# Patient Record
Sex: FEMALE | Race: WHITE | Employment: UNEMPLOYED | URBAN - METROPOLITAN AREA
[De-identification: names, ages, dates, MRNs, and addresses within clinical notes are randomized per-mention and may not be internally consistent; named-entity substitution may affect disease eponyms.]

---

## 2017-01-10 ENCOUNTER — PATIENT MESSAGE (OUTPATIENT)
Dept: FAMILY MEDICINE CLINIC | Age: 37
End: 2017-01-10

## 2017-01-10 RX ORDER — METHYLPHENIDATE HYDROCHLORIDE 10 MG/1
10 TABLET ORAL 2 TIMES DAILY
Qty: 60 TABLET | Refills: 0 | Status: SHIPPED | OUTPATIENT
Start: 2017-01-10 | End: 2017-03-09 | Stop reason: SDUPTHER

## 2017-01-16 RX ORDER — QUETIAPINE FUMARATE 200 MG/1
TABLET, FILM COATED ORAL
Qty: 30 TABLET | Refills: 1 | Status: SHIPPED | OUTPATIENT
Start: 2017-01-16 | End: 2017-03-20 | Stop reason: SDUPTHER

## 2017-01-31 RX ORDER — ALPRAZOLAM 2 MG/1
TABLET ORAL
Qty: 70 TABLET | Refills: 2 | Status: SHIPPED | OUTPATIENT
Start: 2017-01-31 | End: 2017-05-12 | Stop reason: SDUPTHER

## 2017-02-07 ENCOUNTER — PATIENT MESSAGE (OUTPATIENT)
Dept: FAMILY MEDICINE CLINIC | Age: 37
End: 2017-02-07

## 2017-02-15 RX ORDER — BUSPIRONE HYDROCHLORIDE 30 MG/1
45 TABLET ORAL 2 TIMES DAILY
Qty: 90 TABLET | Refills: 5 | Status: SHIPPED | OUTPATIENT
Start: 2017-02-15 | End: 2017-08-18 | Stop reason: SDUPTHER

## 2017-02-22 ENCOUNTER — HOSPITAL ENCOUNTER (OUTPATIENT)
Dept: NUCLEAR MEDICINE | Age: 37
Discharge: HOME | End: 2017-02-22
Admitting: INTERNAL MEDICINE

## 2017-02-22 DIAGNOSIS — K30 FUNCTIONAL DYSPEPSIA: ICD-10-CM

## 2017-02-22 RX ADMIN — Medication 2 MILLICURIE: at 09:03

## 2017-03-07 ENCOUNTER — PATIENT MESSAGE (OUTPATIENT)
Dept: FAMILY MEDICINE CLINIC | Age: 37
End: 2017-03-07

## 2017-03-09 ENCOUNTER — PATIENT MESSAGE (OUTPATIENT)
Dept: FAMILY MEDICINE CLINIC | Age: 37
End: 2017-03-09

## 2017-03-09 RX ORDER — SUMATRIPTAN 50 MG/1
50 TABLET, FILM COATED ORAL DAILY
Qty: 30 TABLET | Refills: 1 | Status: SHIPPED | OUTPATIENT
Start: 2017-03-09 | End: 2017-07-27 | Stop reason: SDUPTHER

## 2017-03-09 RX ORDER — METHYLPHENIDATE HYDROCHLORIDE 10 MG/1
10 TABLET ORAL 2 TIMES DAILY
Qty: 60 TABLET | Refills: 0 | Status: SHIPPED | OUTPATIENT
Start: 2017-03-09 | End: 2017-05-24 | Stop reason: SDUPTHER

## 2017-03-13 RX ORDER — LEVOTHYROXINE SODIUM 0.05 MG/1
100 TABLET ORAL DAILY
Qty: 60 TABLET | Refills: 0 | Status: SHIPPED | OUTPATIENT
Start: 2017-03-13 | End: 2017-04-23 | Stop reason: SDUPTHER

## 2017-03-25 ENCOUNTER — PATIENT MESSAGE (OUTPATIENT)
Dept: FAMILY MEDICINE CLINIC | Age: 37
End: 2017-03-25

## 2017-03-25 DIAGNOSIS — I83.90 VARICOSE VEIN OF LEG: Primary | ICD-10-CM

## 2017-04-20 RX ORDER — QUETIAPINE FUMARATE 200 MG/1
TABLET, FILM COATED ORAL
Qty: 30 TABLET | Refills: 3 | Status: SHIPPED | OUTPATIENT
Start: 2017-04-20 | End: 2017-09-18 | Stop reason: SDUPTHER

## 2017-04-20 RX ORDER — CLOMIPRAMINE HYDROCHLORIDE 50 MG/1
CAPSULE ORAL
Qty: 60 CAPSULE | Refills: 3 | Status: SHIPPED | OUTPATIENT
Start: 2017-04-20 | End: 2017-09-25 | Stop reason: SDUPTHER

## 2017-04-20 RX ORDER — CYCLOBENZAPRINE HCL 10 MG
TABLET ORAL
Qty: 30 TABLET | Refills: 3 | Status: SHIPPED | OUTPATIENT
Start: 2017-04-20 | End: 2017-08-21 | Stop reason: SDUPTHER

## 2017-04-24 RX ORDER — LEVOTHYROXINE SODIUM 0.05 MG/1
TABLET ORAL
Qty: 60 TABLET | Refills: 0 | Status: SHIPPED | OUTPATIENT
Start: 2017-04-24 | End: 2017-06-01 | Stop reason: SDUPTHER

## 2017-05-11 ENCOUNTER — TELEPHONE (OUTPATIENT)
Dept: FAMILY MEDICINE CLINIC | Age: 37
End: 2017-05-11

## 2017-05-12 ENCOUNTER — PATIENT MESSAGE (OUTPATIENT)
Dept: FAMILY MEDICINE CLINIC | Age: 37
End: 2017-05-12

## 2017-05-12 RX ORDER — ALPRAZOLAM 2 MG/1
2 TABLET ORAL 3 TIMES DAILY PRN
Qty: 90 TABLET | Refills: 2 | Status: SHIPPED | OUTPATIENT
Start: 2017-05-12 | End: 2017-08-18 | Stop reason: SDUPTHER

## 2017-05-24 ENCOUNTER — PATIENT MESSAGE (OUTPATIENT)
Dept: FAMILY MEDICINE CLINIC | Age: 37
End: 2017-05-24

## 2017-05-24 RX ORDER — METHYLPHENIDATE HYDROCHLORIDE 10 MG/1
10 TABLET ORAL 2 TIMES DAILY
Qty: 60 TABLET | Refills: 0 | Status: SHIPPED | OUTPATIENT
Start: 2017-05-24 | End: 2017-07-14 | Stop reason: SDUPTHER

## 2017-05-26 ENCOUNTER — OFFICE VISIT (OUTPATIENT)
Dept: FAMILY MEDICINE CLINIC | Age: 37
End: 2017-05-26

## 2017-05-26 VITALS
BODY MASS INDEX: 45.99 KG/M2 | SYSTOLIC BLOOD PRESSURE: 124 MMHG | DIASTOLIC BLOOD PRESSURE: 80 MMHG | TEMPERATURE: 97.7 F | WEIGHT: 293 LBS | HEART RATE: 114 BPM | HEIGHT: 67 IN | RESPIRATION RATE: 20 BRPM

## 2017-05-26 DIAGNOSIS — E78.5 HYPERLIPIDEMIA, UNSPECIFIED: ICD-10-CM

## 2017-05-26 DIAGNOSIS — F98.8 ADD (ATTENTION DEFICIT DISORDER): ICD-10-CM

## 2017-05-26 DIAGNOSIS — K76.0 NAFLD (NONALCOHOLIC FATTY LIVER DISEASE): ICD-10-CM

## 2017-05-26 DIAGNOSIS — F31.81 BIPOLAR 2 DISORDER (HCC): ICD-10-CM

## 2017-05-26 DIAGNOSIS — K31.84 GASTROPARESIS: ICD-10-CM

## 2017-05-26 DIAGNOSIS — F41.9 ANXIETY: Primary | ICD-10-CM

## 2017-05-26 DIAGNOSIS — Z13.1 SCREENING FOR DIABETES MELLITUS: ICD-10-CM

## 2017-05-26 DIAGNOSIS — F51.04 PSYCHOPHYSIOLOGICAL INSOMNIA: ICD-10-CM

## 2017-05-26 DIAGNOSIS — E03.9 ACQUIRED HYPOTHYROIDISM: ICD-10-CM

## 2017-05-26 PROCEDURE — 99214 OFFICE O/P EST MOD 30 MIN: CPT | Performed by: FAMILY MEDICINE

## 2017-05-26 PROCEDURE — G0444 DEPRESSION SCREEN ANNUAL: HCPCS | Performed by: FAMILY MEDICINE

## 2017-05-26 RX ORDER — TOPIRAMATE 100 MG/1
100 TABLET, FILM COATED ORAL 2 TIMES DAILY
Qty: 60 TABLET | Refills: 3 | Status: SHIPPED | OUTPATIENT
Start: 2017-05-26 | End: 2017-09-18 | Stop reason: SDUPTHER

## 2017-05-26 ASSESSMENT — PATIENT HEALTH QUESTIONNAIRE - PHQ9
SUM OF ALL RESPONSES TO PHQ9 QUESTIONS 1 & 2: 6
1. LITTLE INTEREST OR PLEASURE IN DOING THINGS: 3
10. IF YOU CHECKED OFF ANY PROBLEMS, HOW DIFFICULT HAVE THESE PROBLEMS MADE IT FOR YOU TO DO YOUR WORK, TAKE CARE OF THINGS AT HOME, OR GET ALONG WITH OTHER PEOPLE: 0
7. TROUBLE CONCENTRATING ON THINGS, SUCH AS READING THE NEWSPAPER OR WATCHING TELEVISION: 3
3. TROUBLE FALLING OR STAYING ASLEEP: 3
4. FEELING TIRED OR HAVING LITTLE ENERGY: 3
5. POOR APPETITE OR OVEREATING: 0
6. FEELING BAD ABOUT YOURSELF - OR THAT YOU ARE A FAILURE OR HAVE LET YOURSELF OR YOUR FAMILY DOWN: 3
9. THOUGHTS THAT YOU WOULD BE BETTER OFF DEAD, OR OF HURTING YOURSELF: 0
2. FEELING DOWN, DEPRESSED OR HOPELESS: 3
SUM OF ALL RESPONSES TO PHQ QUESTIONS 1-9: 18
8. MOVING OR SPEAKING SO SLOWLY THAT OTHER PEOPLE COULD HAVE NOTICED. OR THE OPPOSITE, BEING SO FIGETY OR RESTLESS THAT YOU HAVE BEEN MOVING AROUND A LOT MORE THAN USUAL: 0

## 2017-06-02 RX ORDER — LEVOTHYROXINE SODIUM 0.05 MG/1
TABLET ORAL
Qty: 60 TABLET | Refills: 0 | Status: SHIPPED | OUTPATIENT
Start: 2017-06-02 | End: 2018-02-22 | Stop reason: SDUPTHER

## 2017-06-12 RX ORDER — OMEPRAZOLE 40 MG/1
CAPSULE, DELAYED RELEASE ORAL
Qty: 60 CAPSULE | Refills: 5 | Status: SHIPPED | OUTPATIENT
Start: 2017-06-12 | End: 2017-11-22 | Stop reason: SDUPTHER

## 2017-07-12 ENCOUNTER — PATIENT MESSAGE (OUTPATIENT)
Dept: FAMILY MEDICINE CLINIC | Age: 37
End: 2017-07-12

## 2017-07-14 RX ORDER — METHYLPHENIDATE HYDROCHLORIDE 10 MG/1
10 TABLET ORAL 2 TIMES DAILY
Qty: 60 TABLET | Refills: 0 | Status: SHIPPED | OUTPATIENT
Start: 2017-07-14 | End: 2017-09-15 | Stop reason: SDUPTHER

## 2017-07-21 RX ORDER — LAMOTRIGINE 200 MG/1
TABLET ORAL
Qty: 60 TABLET | Refills: 3 | Status: SHIPPED | OUTPATIENT
Start: 2017-07-21 | End: 2017-11-11 | Stop reason: SDUPTHER

## 2017-07-27 ENCOUNTER — TELEPHONE (OUTPATIENT)
Dept: FAMILY MEDICINE CLINIC | Age: 37
End: 2017-07-27

## 2017-07-27 RX ORDER — SUMATRIPTAN 50 MG/1
TABLET, FILM COATED ORAL
Qty: 9 TABLET | Refills: 3 | Status: SHIPPED | OUTPATIENT
Start: 2017-07-27 | End: 2017-12-12 | Stop reason: SDUPTHER

## 2017-07-31 ENCOUNTER — PATIENT MESSAGE (OUTPATIENT)
Dept: FAMILY MEDICINE CLINIC | Age: 37
End: 2017-07-31

## 2017-08-01 RX ORDER — PREDNISONE 10 MG/1
TABLET ORAL
Qty: 30 TABLET | Refills: 0 | Status: SHIPPED | OUTPATIENT
Start: 2017-08-01 | End: 2017-08-11

## 2017-08-01 RX ORDER — PREDNISONE 10 MG/1
30 TABLET ORAL DAILY
Qty: 21 TABLET | Refills: 0 | Status: SHIPPED | OUTPATIENT
Start: 2017-08-01 | End: 2017-08-08

## 2017-08-21 RX ORDER — BUSPIRONE HYDROCHLORIDE 30 MG/1
TABLET ORAL
Qty: 90 TABLET | Refills: 4 | Status: SHIPPED | OUTPATIENT
Start: 2017-08-21 | End: 2018-01-07 | Stop reason: SDUPTHER

## 2017-08-21 RX ORDER — CYCLOBENZAPRINE HCL 10 MG
TABLET ORAL
Qty: 30 TABLET | Refills: 3 | Status: SHIPPED | OUTPATIENT
Start: 2017-08-21 | End: 2017-12-22 | Stop reason: SDUPTHER

## 2017-08-21 RX ORDER — ALPRAZOLAM 2 MG/1
TABLET ORAL
Qty: 90 TABLET | Refills: 2 | Status: SHIPPED | OUTPATIENT
Start: 2017-08-21 | End: 2017-11-11 | Stop reason: SDUPTHER

## 2017-08-23 DIAGNOSIS — K76.0 NAFLD (NONALCOHOLIC FATTY LIVER DISEASE): ICD-10-CM

## 2017-08-23 DIAGNOSIS — Z13.1 SCREENING FOR DIABETES MELLITUS: ICD-10-CM

## 2017-08-23 DIAGNOSIS — E78.5 HYPERLIPIDEMIA, UNSPECIFIED: ICD-10-CM

## 2017-08-23 DIAGNOSIS — E03.9 ACQUIRED HYPOTHYROIDISM: ICD-10-CM

## 2017-08-23 LAB
A/G RATIO: 1.4 (ref 1.1–2.2)
ALBUMIN SERPL-MCNC: 4 G/DL (ref 3.4–5)
ALP BLD-CCNC: 119 U/L (ref 40–129)
ALT SERPL-CCNC: 71 U/L (ref 10–40)
ANION GAP SERPL CALCULATED.3IONS-SCNC: 18 MMOL/L (ref 3–16)
AST SERPL-CCNC: 63 U/L (ref 15–37)
BILIRUB SERPL-MCNC: 0.3 MG/DL (ref 0–1)
BUN BLDV-MCNC: 17 MG/DL (ref 7–20)
CALCIUM SERPL-MCNC: 9.3 MG/DL (ref 8.3–10.6)
CHLORIDE BLD-SCNC: 99 MMOL/L (ref 99–110)
CHOLESTEROL, TOTAL: 205 MG/DL (ref 0–199)
CO2: 21 MMOL/L (ref 21–32)
CREAT SERPL-MCNC: 0.6 MG/DL (ref 0.6–1.1)
GFR AFRICAN AMERICAN: >60
GFR NON-AFRICAN AMERICAN: >60
GLOBULIN: 2.8 G/DL
GLUCOSE BLD-MCNC: 205 MG/DL (ref 70–99)
HDLC SERPL-MCNC: 36 MG/DL (ref 40–60)
LDL CHOLESTEROL CALCULATED: 127 MG/DL
POTASSIUM SERPL-SCNC: 4.4 MMOL/L (ref 3.5–5.1)
SODIUM BLD-SCNC: 138 MMOL/L (ref 136–145)
TOTAL PROTEIN: 6.8 G/DL (ref 6.4–8.2)
TRIGL SERPL-MCNC: 210 MG/DL (ref 0–150)
TSH SERPL DL<=0.05 MIU/L-ACNC: 1.43 UIU/ML (ref 0.27–4.2)
VLDLC SERPL CALC-MCNC: 42 MG/DL

## 2017-08-24 LAB
ESTIMATED AVERAGE GLUCOSE: 260.4 MG/DL
HBA1C MFR BLD: 10.7 %

## 2017-08-25 DIAGNOSIS — E11.9 TYPE 2 DIABETES MELLITUS WITHOUT COMPLICATION, WITHOUT LONG-TERM CURRENT USE OF INSULIN (HCC): Primary | ICD-10-CM

## 2017-08-29 ENCOUNTER — OFFICE VISIT (OUTPATIENT)
Dept: FAMILY MEDICINE CLINIC | Age: 37
End: 2017-08-29

## 2017-08-29 VITALS
RESPIRATION RATE: 20 BRPM | SYSTOLIC BLOOD PRESSURE: 132 MMHG | HEIGHT: 67 IN | TEMPERATURE: 98 F | BODY MASS INDEX: 45.99 KG/M2 | WEIGHT: 293 LBS | HEART RATE: 98 BPM | DIASTOLIC BLOOD PRESSURE: 86 MMHG

## 2017-08-29 DIAGNOSIS — E11.9 TYPE 2 DIABETES MELLITUS WITHOUT COMPLICATION, WITHOUT LONG-TERM CURRENT USE OF INSULIN (HCC): Primary | ICD-10-CM

## 2017-08-29 DIAGNOSIS — E11.9 TYPE 2 DIABETES MELLITUS WITHOUT COMPLICATION, WITHOUT LONG-TERM CURRENT USE OF INSULIN (HCC): ICD-10-CM

## 2017-08-29 LAB
CREATININE URINE: 134.6 MG/DL (ref 28–259)
MICROALBUMIN UR-MCNC: 4.1 MG/DL
MICROALBUMIN/CREAT UR-RTO: 30.5 MG/G (ref 0–30)

## 2017-08-29 PROCEDURE — 99214 OFFICE O/P EST MOD 30 MIN: CPT | Performed by: FAMILY MEDICINE

## 2017-08-29 RX ORDER — LANCING DEVICE/LANCETS
KIT MISCELLANEOUS
Qty: 1 KIT | Refills: 0 | Status: SHIPPED | OUTPATIENT
Start: 2017-08-29 | End: 2018-02-22 | Stop reason: SDUPTHER

## 2017-08-29 RX ORDER — LANCETS
EACH MISCELLANEOUS
Qty: 100 EACH | Refills: 5 | Status: SHIPPED | OUTPATIENT
Start: 2017-08-29 | End: 2018-08-24 | Stop reason: SDUPTHER

## 2017-08-30 ENCOUNTER — TELEPHONE (OUTPATIENT)
Dept: FAMILY MEDICINE CLINIC | Age: 37
End: 2017-08-30

## 2017-08-31 ENCOUNTER — TELEPHONE (OUTPATIENT)
Dept: FAMILY MEDICINE CLINIC | Age: 37
End: 2017-08-31

## 2017-09-25 ENCOUNTER — OFFICE VISIT (OUTPATIENT)
Dept: SLEEP MEDICINE | Age: 37
End: 2017-09-25

## 2017-09-25 VITALS
TEMPERATURE: 97.8 F | HEART RATE: 109 BPM | RESPIRATION RATE: 16 BRPM | OXYGEN SATURATION: 95 % | DIASTOLIC BLOOD PRESSURE: 78 MMHG | BODY MASS INDEX: 45.99 KG/M2 | WEIGHT: 293 LBS | SYSTOLIC BLOOD PRESSURE: 118 MMHG | HEIGHT: 67 IN

## 2017-09-25 DIAGNOSIS — G47.33 OSA (OBSTRUCTIVE SLEEP APNEA): Primary | ICD-10-CM

## 2017-09-25 PROCEDURE — 99204 OFFICE O/P NEW MOD 45 MIN: CPT | Performed by: PSYCHIATRY & NEUROLOGY

## 2017-09-25 ASSESSMENT — ENCOUNTER SYMPTOMS
EYES NEGATIVE: 1
CHOKING: 1
ALLERGIC/IMMUNOLOGIC NEGATIVE: 1
GASTROINTESTINAL NEGATIVE: 1
BACK PAIN: 1

## 2017-09-25 ASSESSMENT — SLEEP AND FATIGUE QUESTIONNAIRES
HOW LIKELY ARE YOU TO NOD OFF OR FALL ASLEEP WHEN YOU ARE A PASSENGER IN A CAR FOR AN HOUR WITHOUT A BREAK: 3
HOW LIKELY ARE YOU TO NOD OFF OR FALL ASLEEP WHILE WATCHING TV: 3
HOW LIKELY ARE YOU TO NOD OFF OR FALL ASLEEP WHILE SITTING QUIETLY AFTER LUNCH WITHOUT ALCOHOL: 3
ESS TOTAL SCORE: 15
HOW LIKELY ARE YOU TO NOD OFF OR FALL ASLEEP WHILE LYING DOWN TO REST IN THE AFTERNOON WHEN CIRCUMSTANCES PERMIT: 3
HOW LIKELY ARE YOU TO NOD OFF OR FALL ASLEEP WHILE SITTING AND TALKING TO SOMEONE: 0
HOW LIKELY ARE YOU TO NOD OFF OR FALL ASLEEP WHILE SITTING AND READING: 3
HOW LIKELY ARE YOU TO NOD OFF OR FALL ASLEEP WHILE SITTING INACTIVE IN A PUBLIC PLACE: 0
HOW LIKELY ARE YOU TO NOD OFF OR FALL ASLEEP IN A CAR, WHILE STOPPED FOR A FEW MINUTES IN TRAFFIC: 0

## 2017-10-11 RX ORDER — TOPIRAMATE 50 MG/1
TABLET, FILM COATED ORAL
Qty: 60 TABLET | Refills: 4 | OUTPATIENT
Start: 2017-10-11

## 2017-10-24 DIAGNOSIS — E78.2 MIXED HYPERLIPIDEMIA: ICD-10-CM

## 2017-10-24 RX ORDER — ATORVASTATIN CALCIUM 40 MG/1
TABLET, FILM COATED ORAL
Qty: 30 TABLET | Refills: 5 | Status: SHIPPED | OUTPATIENT
Start: 2017-10-24 | End: 2018-04-24 | Stop reason: SDUPTHER

## 2017-10-24 RX ORDER — LEVOTHYROXINE SODIUM 0.05 MG/1
TABLET ORAL
Qty: 60 TABLET | Refills: 5 | Status: SHIPPED | OUTPATIENT
Start: 2017-10-24 | End: 2018-05-07 | Stop reason: SDUPTHER

## 2017-10-29 ENCOUNTER — HOSPITAL ENCOUNTER (OUTPATIENT)
Dept: OTHER | Age: 37
Discharge: HOME | End: 2017-10-30

## 2017-10-29 ENCOUNTER — HOSPITAL ENCOUNTER (OUTPATIENT)
Dept: OTHER | Age: 37
Discharge: HOME | End: 2017-10-29
Attending: PSYCHIATRY & NEUROLOGY | Admitting: PSYCHIATRY & NEUROLOGY

## 2017-10-29 DIAGNOSIS — G47.33 OSA (OBSTRUCTIVE SLEEP APNEA): ICD-10-CM

## 2017-10-30 PROCEDURE — 95810 POLYSOM 6/> YRS 4/> PARAM: CPT | Performed by: PSYCHIATRY & NEUROLOGY

## 2017-11-01 ENCOUNTER — TELEPHONE (OUTPATIENT)
Dept: PULMONOLOGY | Age: 37
End: 2017-11-01

## 2017-11-13 ENCOUNTER — PATIENT MESSAGE (OUTPATIENT)
Dept: FAMILY MEDICINE CLINIC | Age: 37
End: 2017-11-13

## 2017-11-13 RX ORDER — TOPIRAMATE 100 MG/1
TABLET, FILM COATED ORAL
Qty: 60 TABLET | Refills: 5 | Status: SHIPPED | OUTPATIENT
Start: 2017-11-13 | End: 2018-05-21

## 2017-11-13 RX ORDER — LAMOTRIGINE 200 MG/1
TABLET ORAL
Qty: 60 TABLET | Refills: 2 | Status: SHIPPED | OUTPATIENT
Start: 2017-11-13 | End: 2018-05-21

## 2017-11-13 RX ORDER — ALPRAZOLAM 2 MG/1
TABLET ORAL
Qty: 90 TABLET | Refills: 2 | Status: SHIPPED | OUTPATIENT
Start: 2017-11-13 | End: 2018-02-06 | Stop reason: SDUPTHER

## 2017-11-13 RX ORDER — METHYLPHENIDATE HYDROCHLORIDE 10 MG/1
10 TABLET ORAL 2 TIMES DAILY
Qty: 60 TABLET | Refills: 0 | Status: SHIPPED | OUTPATIENT
Start: 2017-11-13 | End: 2017-12-22 | Stop reason: SDUPTHER

## 2017-11-13 RX ORDER — TOPIRAMATE 50 MG/1
TABLET, FILM COATED ORAL
Qty: 60 TABLET | Refills: 4 | OUTPATIENT
Start: 2017-11-13

## 2017-11-13 NOTE — TELEPHONE ENCOUNTER
From: Lilli Mata  To: Cristopher Figueroa MD  Sent: 11/13/2017 11:23 AM EST  Subject: Prescription Question    Hello!  I need a refill on my Ritalin please

## 2017-11-21 ENCOUNTER — TELEPHONE (OUTPATIENT)
Dept: PULMONOLOGY | Age: 37
End: 2017-11-21

## 2017-11-21 NOTE — TELEPHONE ENCOUNTER
Pt called, stated she would like to get the oral appliance instead of cpap, is there a dentist that you refer to?    Spoke to [facility] and canceled titration

## 2017-11-24 RX ORDER — BUDESONIDE AND FORMOTEROL FUMARATE DIHYDRATE 160; 4.5 UG/1; UG/1
AEROSOL RESPIRATORY (INHALATION)
Qty: 10.2 G | Refills: 4 | Status: SHIPPED | OUTPATIENT
Start: 2017-11-24 | End: 2018-12-10 | Stop reason: CLARIF

## 2017-11-24 RX ORDER — OMEPRAZOLE 40 MG/1
CAPSULE, DELAYED RELEASE ORAL
Qty: 60 CAPSULE | Refills: 4 | Status: ON HOLD | OUTPATIENT
Start: 2017-11-24 | End: 2018-04-07 | Stop reason: HOSPADM

## 2017-12-12 RX ORDER — SUMATRIPTAN 50 MG/1
TABLET, FILM COATED ORAL
Qty: 9 TABLET | Refills: 2 | Status: SHIPPED | OUTPATIENT
Start: 2017-12-12 | End: 2018-05-02 | Stop reason: SDUPTHER

## 2017-12-12 RX ORDER — BLOOD-GLUCOSE METER
EACH MISCELLANEOUS
Qty: 1 KIT | Refills: 0 | Status: SHIPPED | OUTPATIENT
Start: 2017-12-12 | End: 2019-09-20

## 2017-12-12 RX ORDER — TOPIRAMATE 50 MG/1
TABLET, FILM COATED ORAL
Qty: 60 TABLET | Refills: 4 | Status: SHIPPED | OUTPATIENT
Start: 2017-12-12 | End: 2018-05-21

## 2017-12-22 ENCOUNTER — PATIENT MESSAGE (OUTPATIENT)
Dept: FAMILY MEDICINE CLINIC | Age: 37
End: 2017-12-22

## 2017-12-22 DIAGNOSIS — F98.8 ATTENTION DEFICIT DISORDER (ADD) WITHOUT HYPERACTIVITY: Primary | ICD-10-CM

## 2017-12-22 RX ORDER — CYCLOBENZAPRINE HCL 10 MG
TABLET ORAL
Qty: 30 TABLET | Refills: 3 | Status: SHIPPED | OUTPATIENT
Start: 2017-12-22 | End: 2018-03-06 | Stop reason: SDUPTHER

## 2017-12-22 RX ORDER — METHYLPHENIDATE HYDROCHLORIDE 10 MG/1
10 TABLET ORAL 2 TIMES DAILY
Qty: 60 TABLET | Refills: 0 | Status: SHIPPED | OUTPATIENT
Start: 2017-12-22 | End: 2018-01-10 | Stop reason: SDUPTHER

## 2017-12-22 NOTE — TELEPHONE ENCOUNTER
From: Jonatan Whitlock  To: Shanti Griffin MD  Sent: 12/22/2017 4:02 PM EST  Subject: Non-Urgent Medical Question    I need a refill on Ritalin please!

## 2018-01-04 ENCOUNTER — PATIENT MESSAGE (OUTPATIENT)
Dept: FAMILY MEDICINE CLINIC | Age: 38
End: 2018-01-04

## 2018-01-05 RX ORDER — FLUCONAZOLE 150 MG/1
150 TABLET ORAL ONCE
Qty: 2 TABLET | Refills: 0 | Status: SHIPPED | OUTPATIENT
Start: 2018-01-05 | End: 2018-01-05

## 2018-01-05 NOTE — TELEPHONE ENCOUNTER
From: Cierra Antony  To: Mj Barnes MD  Sent: 1/4/2018 10:12 PM EST  Subject: Prescription Question    Hello! I've finished my 2nd round of amoxicillin from my dentist.. Lana Duron I'm in need of another Rx of Diflucan please.

## 2018-01-08 RX ORDER — QUETIAPINE FUMARATE 200 MG/1
TABLET, FILM COATED ORAL
Qty: 30 TABLET | Refills: 1 | Status: SHIPPED | OUTPATIENT
Start: 2018-01-08 | End: 2018-03-09 | Stop reason: SDUPTHER

## 2018-01-08 RX ORDER — BUSPIRONE HYDROCHLORIDE 30 MG/1
TABLET ORAL
Qty: 90 TABLET | Refills: 1 | Status: SHIPPED | OUTPATIENT
Start: 2018-01-08 | End: 2018-02-22 | Stop reason: SDUPTHER

## 2018-01-09 ENCOUNTER — TELEPHONE (OUTPATIENT)
Dept: FAMILY MEDICINE CLINIC | Age: 38
End: 2018-01-09

## 2018-01-11 RX ORDER — BUSPIRONE HYDROCHLORIDE 30 MG/1
TABLET ORAL
Qty: 90 TABLET | Refills: 3 | Status: SHIPPED | OUTPATIENT
Start: 2018-01-11 | End: 2018-05-07 | Stop reason: SDUPTHER

## 2018-02-06 ENCOUNTER — TELEPHONE (OUTPATIENT)
Dept: BARIATRICS/WEIGHT MGMT | Age: 38
End: 2018-02-06

## 2018-02-06 DIAGNOSIS — F41.9 ANXIETY: Primary | ICD-10-CM

## 2018-02-06 RX ORDER — DULAGLUTIDE 0.75 MG/.5ML
INJECTION, SOLUTION SUBCUTANEOUS
Qty: 2 ML | Refills: 1 | Status: SHIPPED | OUTPATIENT
Start: 2018-02-06 | End: 2018-05-21

## 2018-02-06 NOTE — TELEPHONE ENCOUNTER
Patient attended a seminar 1/25/18 with Dr Laisha Godoy. She DOES have BWLS coverage with [employer] (6mo consecutive diet) BMI Form scanned in media. **BMI Form states Patient must be 100lbs overweight or more than two times the recommended body weight - whatever the patient should be on the BMI scale, the patient should be two times that weight OR 100lbs over the recommeded body weight for their height on the BMI scale; This does not guarantee the beneit will be covered; It is what is required to consider the benefit approval**     Spoke with patient, information given. Explained to patient we have not see this time of insurance criteria before. Explained it is in her best interest to contact her insurance company to be sure. She stated based off this information she believes she does meet criteria and wanted to proceed with scheduling with Dr. Laisha Godoy.  Appt made

## 2018-02-07 RX ORDER — ALPRAZOLAM 2 MG/1
TABLET ORAL
Qty: 90 TABLET | Refills: 2 | Status: SHIPPED | OUTPATIENT
Start: 2018-02-07 | End: 2018-05-07 | Stop reason: SDUPTHER

## 2018-02-07 RX ORDER — CLOMIPRAMINE HYDROCHLORIDE 50 MG/1
CAPSULE ORAL
Qty: 60 CAPSULE | Refills: 2 | Status: SHIPPED | OUTPATIENT
Start: 2018-02-07 | End: 2018-05-07 | Stop reason: SDUPTHER

## 2018-02-08 ENCOUNTER — TELEPHONE (OUTPATIENT)
Dept: FAMILY MEDICINE CLINIC | Age: 38
End: 2018-02-08

## 2018-02-08 RX ORDER — OSELTAMIVIR PHOSPHATE 75 MG/1
75 CAPSULE ORAL DAILY
Qty: 10 CAPSULE | Refills: 0 | Status: SHIPPED | OUTPATIENT
Start: 2018-02-08 | End: 2018-02-18

## 2018-02-10 RX ORDER — TOPIRAMATE 100 MG/1
TABLET, FILM COATED ORAL
Qty: 60 TABLET | Refills: 2 | Status: SHIPPED | OUTPATIENT
Start: 2018-02-10 | End: 2018-02-22 | Stop reason: SDUPTHER

## 2018-02-10 RX ORDER — LAMOTRIGINE 200 MG/1
TABLET ORAL
Qty: 60 TABLET | Refills: 2 | Status: SHIPPED | OUTPATIENT
Start: 2018-02-10 | End: 2018-02-22 | Stop reason: SDUPTHER

## 2018-02-22 ENCOUNTER — OFFICE VISIT (OUTPATIENT)
Dept: BARIATRICS/WEIGHT MGMT | Age: 38
End: 2018-02-22

## 2018-02-22 VITALS
DIASTOLIC BLOOD PRESSURE: 82 MMHG | BODY MASS INDEX: 50.02 KG/M2 | WEIGHT: 293 LBS | SYSTOLIC BLOOD PRESSURE: 122 MMHG | HEART RATE: 109 BPM | HEIGHT: 64 IN

## 2018-02-22 DIAGNOSIS — E66.01 MORBID OBESITY WITH BMI OF 50.0-59.9, ADULT (HCC): Primary | ICD-10-CM

## 2018-02-22 DIAGNOSIS — E11.69 DIABETES MELLITUS TYPE 2 IN OBESE (HCC): ICD-10-CM

## 2018-02-22 DIAGNOSIS — E66.9 DIABETES MELLITUS TYPE 2 IN OBESE (HCC): ICD-10-CM

## 2018-02-22 PROCEDURE — 99205 OFFICE O/P NEW HI 60 MIN: CPT | Performed by: SURGERY

## 2018-02-22 NOTE — PROGRESS NOTES
Simona Garcia is very motivated to lose weight and being more healthy. We discussed how her weight affects her overall health including:  Patient Active Problem List   Diagnosis    Other ovarian dysfunction (luteal est/progest deficiency)    Allergy to eggs    Pelvic pain in female    Vitamin D deficiency    PMS (premenstrual syndrome)    Hypothyroidism    PCOD (polycystic ovarian disease)    Anxiety    ADD (attention deficit disorder)    Bipolar 2 disorder ([facility]    Family history of breast cancer (mother, age 36)   Janiya Arredondo Asthma    Insomnia- on Belsomra (NEEDS MED CONTRACT)    Allergic rhinitis    Chronic constipation    Moderate right ankle sprain    Hyperlipidemia, unspecified    NAFLD (nonalcoholic fatty liver disease)    Chronic GERD    Migraine    Morbid obesity with BMI of 50.0-59.9, adult (HCC)    Acute lateral meniscus tear of right knee    Gastroparesis    MELONIE (obstructive sleep apnea)    Diabetes mellitus type 2 in obese ([facility]      The patient underwent 30 minutes of dietary counseling. I have reviewed, discussed and agree with the dietary plan. Medical weight loss and different surgical options were discussed in details with patient. Anton Khan is interested in surgical weight loss. Patient is interested in Laparoscopic Sleeve Gastrectomy, which I believe is an excellent option for the patient. We will proceed with pre-operative work up labs and studies. Will also petition patient's  insurance for approval for this procedure. Patient received dietary handouts and education. Patient advised that its their responsibility to follow up for studies and/or labs ordered today. Also discussed in details the importance of follow up, as well following the recommendations and completing the whole program to improve outcomes when it comes to healthier lifestyle as well weight loss.    Patient also advised about risks and benefits being on a strict dietary regimen as well using supplements. Patient agrees and wants to proceed with weight loss planning     Labs ordered. Cardiac Clearance. Pulmonary Clearance. Psych Evaluation. EGD  Support Group. PCP Letter. Weight History 2 yrs. F/U in 4 weeks. Patient advised that its their responsibility to follow up for studies and/or labs ordered today. I spent about 60 minutes on this consultation, with >50% time spent face-to-face counseling and coordinating care.     Rhiannon Villanueva, DO

## 2018-02-22 NOTE — PROGRESS NOTES
Yumi Hammer is a 42 y.o. female with a date of birth of 1980. Vitals:    02/22/18 1352   BP: 122/82   Pulse: 109    BMI: Body mass index is 59.34 kg/m². Obesity Classification: Class III    Weight History: Wt Readings from Last 3 Encounters:   02/22/18 (!) 348 lb 6.4 oz (158 kg)   09/25/17 (!) 337 lb 6.4 oz (153 kg)   08/29/17 (!) 344 lb 6.4 oz (156.2 kg)       Patient's lowest adult weight was 240 lbs at age 21-23. Patient's highest adult weight was 350 lbs at age 42. Patient has participated in the following weight loss programs: Atkins Diet, [employer], , LF diet, Cabbage Soup, Metabolife/Herbalife & Low carb. Patient has participated in meal replacement/liquid diets - slim fast.    Patient has participated in weight loss medications. Patient is not lactose intolerant. Patient does not have Confucianism/cultural food concerns. Patient does have food allergies - mild egg (diarrhea some). Patient does not tolerate artificial sweeteners. Patient dines out to a sit down restaurant 0 times per month. Patient dines out to a fast food restaurant 3 times per month. 24 hour recall/food frequency chart:  930a - gets up & drinks coffee  Breakfast: no.   Snack: no.   Lunch: yes. 12-2p - egg sandwich (4 eggs on butter & 4 pc toast)  Snack: yes. 330-4p bowl of cereal (frosted flakes OR oreos)  Dinner: yes. 630-7p - stew OR soup (crockpot meals) OR chicken in the summer OR meatloaf w/ hidden veggies (mashed potatoes/cauliflower)  Snack: yes. 8 or 9p -bowl of cereal  Drinks throughout the day: plain water & 1 cup of coffee (10-12oz) w/ regular flavored creamer  Do you drink alcohol? Yes. How often/how much alcohol do you drink: 3-4 Beers or bottle of wine- 3-4x per year. Patient does meet the criteria for binge eating disorder. Patient does not have grazing. Patient does have night eating. Patient does have a history of emotional eating or eating out of boredom.     It does not

## 2018-02-23 ENCOUNTER — PATIENT MESSAGE (OUTPATIENT)
Dept: FAMILY MEDICINE CLINIC | Age: 38
End: 2018-02-23

## 2018-02-23 NOTE — TELEPHONE ENCOUNTER
From: Jacqueline Brito  To: Niki Ellis MD  Sent: 2/23/2018 11:40 AM EST  Subject: Non-Urgent Medical Question    Hello! I met with Dr. Amanda Torres yesterday to get started on the 6 month journey to have bariatric sleeve surgery. He needs a letter from you talking about my current \"problem list\", why I would benefit from the surgery, etc. I have a template that they gave me. Do you need me to bring the template in or is this something you've done for him in the past and can just do for me?

## 2018-03-06 RX ORDER — CYCLOBENZAPRINE HCL 10 MG
TABLET ORAL
Qty: 30 TABLET | Refills: 2 | Status: SHIPPED | OUTPATIENT
Start: 2018-03-06 | End: 2018-06-05

## 2018-03-12 RX ORDER — QUETIAPINE FUMARATE 200 MG/1
TABLET, FILM COATED ORAL
Qty: 30 TABLET | Refills: 1 | Status: SHIPPED | OUTPATIENT
Start: 2018-03-12 | End: 2018-05-07 | Stop reason: SDUPTHER

## 2018-03-15 DIAGNOSIS — E66.01 MORBID OBESITY WITH BMI OF 50.0-59.9, ADULT (HCC): ICD-10-CM

## 2018-03-15 DIAGNOSIS — E11.69 DIABETES MELLITUS TYPE 2 IN OBESE (HCC): ICD-10-CM

## 2018-03-15 DIAGNOSIS — E66.9 DIABETES MELLITUS TYPE 2 IN OBESE (HCC): ICD-10-CM

## 2018-03-15 LAB
A/G RATIO: 1.5 (ref 1.1–2.2)
ALBUMIN SERPL-MCNC: 4.1 G/DL (ref 3.4–5)
ALP BLD-CCNC: 115 U/L (ref 40–129)
ALT SERPL-CCNC: 59 U/L (ref 10–40)
ANION GAP SERPL CALCULATED.3IONS-SCNC: 15 MMOL/L (ref 3–16)
AST SERPL-CCNC: 48 U/L (ref 15–37)
BASOPHILS ABSOLUTE: 0.1 K/UL (ref 0–0.2)
BASOPHILS RELATIVE PERCENT: 1 %
BILIRUB SERPL-MCNC: <0.2 MG/DL (ref 0–1)
BUN BLDV-MCNC: 21 MG/DL (ref 7–20)
CALCIUM SERPL-MCNC: 8.8 MG/DL (ref 8.3–10.6)
CHLORIDE BLD-SCNC: 101 MMOL/L (ref 99–110)
CHOLESTEROL, TOTAL: 209 MG/DL (ref 0–199)
CO2: 24 MMOL/L (ref 21–32)
CREAT SERPL-MCNC: 0.6 MG/DL (ref 0.6–1.1)
EOSINOPHILS ABSOLUTE: 0.3 K/UL (ref 0–0.6)
EOSINOPHILS RELATIVE PERCENT: 2.7 %
FOLATE: >20 NG/ML (ref 4.78–24.2)
GFR AFRICAN AMERICAN: >60
GFR NON-AFRICAN AMERICAN: >60
GLOBULIN: 2.8 G/DL
GLUCOSE BLD-MCNC: 175 MG/DL (ref 70–99)
HCT VFR BLD CALC: 42.8 % (ref 36–48)
HDLC SERPL-MCNC: 41 MG/DL (ref 40–60)
HEMOGLOBIN: 14.1 G/DL (ref 12–16)
IRON SATURATION: 23 % (ref 15–50)
IRON: 82 UG/DL (ref 37–145)
LDL CHOLESTEROL CALCULATED: 129 MG/DL
LYMPHOCYTES ABSOLUTE: 3 K/UL (ref 1–5.1)
LYMPHOCYTES RELATIVE PERCENT: 25.8 %
MCH RBC QN AUTO: 28.5 PG (ref 26–34)
MCHC RBC AUTO-ENTMCNC: 33 G/DL (ref 31–36)
MCV RBC AUTO: 86.4 FL (ref 80–100)
MONOCYTES ABSOLUTE: 0.6 K/UL (ref 0–1.3)
MONOCYTES RELATIVE PERCENT: 4.8 %
NEUTROPHILS ABSOLUTE: 7.6 K/UL (ref 1.7–7.7)
NEUTROPHILS RELATIVE PERCENT: 65.7 %
PDW BLD-RTO: 15.4 % (ref 12.4–15.4)
PLATELET # BLD: 319 K/UL (ref 135–450)
PMV BLD AUTO: 7.6 FL (ref 5–10.5)
POTASSIUM SERPL-SCNC: 4.5 MMOL/L (ref 3.5–5.1)
RBC # BLD: 4.95 M/UL (ref 4–5.2)
SODIUM BLD-SCNC: 140 MMOL/L (ref 136–145)
TOTAL IRON BINDING CAPACITY: 358 UG/DL (ref 260–445)
TOTAL PROTEIN: 6.9 G/DL (ref 6.4–8.2)
TRIGL SERPL-MCNC: 196 MG/DL (ref 0–150)
TSH REFLEX: 2.05 UIU/ML (ref 0.27–4.2)
VITAMIN B-12: 880 PG/ML (ref 211–911)
VITAMIN D 25-HYDROXY: 37.6 NG/ML
VLDLC SERPL CALC-MCNC: 39 MG/DL
WBC # BLD: 11.6 K/UL (ref 4–11)

## 2018-03-16 LAB
ESTIMATED AVERAGE GLUCOSE: 151.3 MG/DL
HBA1C MFR BLD: 6.9 %

## 2018-03-19 DIAGNOSIS — J45.909 MODERATE ASTHMA WITHOUT COMPLICATION, UNSPECIFIED WHETHER PERSISTENT: Primary | ICD-10-CM

## 2018-03-22 ENCOUNTER — OFFICE VISIT (OUTPATIENT)
Dept: BARIATRICS/WEIGHT MGMT | Age: 38
End: 2018-03-22

## 2018-03-22 VITALS
HEIGHT: 64 IN | BODY MASS INDEX: 50.02 KG/M2 | DIASTOLIC BLOOD PRESSURE: 89 MMHG | SYSTOLIC BLOOD PRESSURE: 130 MMHG | HEART RATE: 114 BPM | WEIGHT: 293 LBS

## 2018-03-22 DIAGNOSIS — E11.69 DIABETES MELLITUS TYPE 2 IN OBESE (HCC): ICD-10-CM

## 2018-03-22 DIAGNOSIS — E66.9 DIABETES MELLITUS TYPE 2 IN OBESE (HCC): ICD-10-CM

## 2018-03-22 DIAGNOSIS — E66.01 MORBID OBESITY WITH BMI OF 50.0-59.9, ADULT (HCC): Primary | ICD-10-CM

## 2018-03-22 DIAGNOSIS — K31.84 GASTROPARESIS: ICD-10-CM

## 2018-03-22 PROCEDURE — 99215 OFFICE O/P EST HI 40 MIN: CPT | Performed by: SURGERY

## 2018-03-22 RX ORDER — METOCLOPRAMIDE 5 MG/1
5 TABLET ORAL 3 TIMES DAILY
Qty: 120 TABLET | Refills: 3 | Status: ON HOLD | OUTPATIENT
Start: 2018-03-22 | End: 2018-04-07 | Stop reason: HOSPADM

## 2018-03-22 NOTE — PROGRESS NOTES
TABLETS BY MOUTH TWO TIMES A DAY , Disp: 90 tablet, Rfl: 3    metFORMIN (GLUCOPHAGE) 500 MG tablet, TAKE 1 TABLET BY MOUTH THREE TIMES A DAY WITH MEALS, Disp: 90 tablet, Rfl: 4    topiramate (TOPAMAX) 50 MG tablet, TAKE 1 TABLET BY MOUTH TWO TIMES A DAY , Disp: 60 tablet, Rfl: 4    SUMAtriptan (IMITREX) 50 MG tablet, TAKE 1 TABLET BY MOUTH ONE TIME A DAY AS NEEDED FOR MIGRAINE, Disp: 9 tablet, Rfl: 2    Blood Glucose Monitoring Suppl (ACCU-CHEK SOURAV PLUS) w/Device KIT, USE TO TEST BLOOD GLUCOSE , Disp: 1 kit, Rfl: 0    omeprazole (PRILOSEC) 40 MG delayed release capsule, TAKE 1 CAPSULE BY MOUTH TWO TIMES A DAY , Disp: 60 capsule, Rfl: 4    SYMBICORT 160-4.5 MCG/ACT AERO, INHALE 2 PUFFS BY MOUTH TWO TIMES A DAY , Disp: 10.2 g, Rfl: 4    lamoTRIgine (LAMICTAL) 200 MG tablet, TAKE 1 TABLET BY MOUTH TWO TIMES A DAY , Disp: 60 tablet, Rfl: 2    Suvorexant 20 MG TABS, Take 1 tablet by mouth nightly ., Disp: 30 tablet, Rfl: 2    topiramate (TOPAMAX) 100 MG tablet, TAKE 1 TABLET BY MOUTH TWO TIMES A DAY, Disp: 60 tablet, Rfl: 5    levothyroxine (SYNTHROID) 50 MCG tablet, TAKE 1 TABLET BY MOUTH TWO TIMES A DAY , Disp: 60 tablet, Rfl: 5    atorvastatin (LIPITOR) 40 MG tablet, TAKE 1 TABLET BY MOUTH ONE TIME A DAY , Disp: 30 tablet, Rfl: 5    ACCU-CHEK FASTCLIX LANCETS MISC, Test once a day, Disp: 100 each, Rfl: 5    glucose blood VI test strips (ACCU-CHEK SOURAV PLUS) strip, 1 each by In Vitro route daily As needed. , Disp: 100 each, Rfl: 3    linaclotide (LINZESS) 145 MCG capsule, Take 1 capsule by mouth every morning (before breakfast). , Disp: 30 capsule, Rfl: 3    ipratropium-albuterol (DUONEB) 0.5-2.5 (3) MG/3ML SOLN nebulizer solution, Inhale 3 mLs into the lungs every 4 hours. , Disp: 360 mL, Rfl: 3    albuterol (PROVENTIL;VENTOLIN) 90 MCG/ACT inhaler, Inhale 2 puffs into the lungs every 4 hours as needed. , Disp: 3 Inhaler, Rfl: 0    calcium carbonate (OSCAL) 500 MG TABS tablet, Take 500 mg by mouth daily., Disp: , Rfl:     mometasone (NASONEX) 50 MCG/ACT nasal spray, 2 sprays by Nasal route daily. , Disp: 1 Inhaler, Rfl: 3    Cetirizine HCl (ZYRTEC ALLERGY) 10 MG CAPS, Take 1 tablet by mouth., Disp: 30 capsule, Rfl:     CHELATED POTASSIUM 99 MG TABS, Take 1 tablet by mouth daily. , Disp: , Rfl:     Cholecalciferol (VITAMIN D) 1000 UNIT CAPS, Take 3 capsules by mouth daily. , Disp: , Rfl:       Review of Systems - History obtained from the patient  General ROS: negative  Psychological ROS: negative  Ophthalmic ROS: negative  Neurological ROS: negative  ENT ROS: negative  Allergy and Immunology ROS: negative  Hematological and Lymphatic ROS: negative  Endocrine ROS: negative  Respiratory ROS: negative  Cardiovascular ROS: negative  Gastrointestinal ROS: Abdominal pain  Genito-Urinary ROS: negative  Musculoskeletal ROS: negative   Skin ROS: negative    Physical Exam   Vitals Reviewed   Constitutional: Patient is oriented to person, place, and time. Patient appears well-developed and well-nourished. Patient is active and cooperative. Non-toxic appearance. No distress. HENT:   Head: Normocephalic and atraumatic. Head is without abrasion and without laceration. Hair is normal.   Right Ear: External ear normal. No lacerations. No drainage, swelling . Left Ear: External ear normal. No lacerations. No drainage, swelling. Nose: Nose normal. No nose lacerations or nasal deformity. Eyes: Conjunctivae, EOM and lids are normal. Right eye exhibits no discharge. No foreign body present in the right eye. Left eye exhibits no discharge. No foreign body present in the left eye. No scleral icterus. Neck: Trachea normal and normal range of motion. No JVD present. Pulmonary/Chest: Effort normal. No accessory muscle usage or stridor. No apnea. No respiratory distress. Cardiovascular: Normal rate and no JVD. Abdominal: Normal appearance. Patient exhibits no distension. Abdomen is soft, obese, non tender. Musculoskeletal: Normal range of motion. Patient exhibits no edema. Neurological: Patient is alert and oriented to person, place, and time. Patient has normal strength. GCS eye subscore is 4. GCS verbal subscore is 5. GCS motor subscore is 6. Skin: Skin is warm and dry. No abrasion and no rash noted. Patient is not diaphoretic. No cyanosis or erythema. Psychiatric: Patient has a normal mood and affect. Speech is normal and behavior is normal. Cognition and memory are normal.       A/P    Deniz Morse is 42 y.o. female, Body mass index is 58.01 kg/m². pre surgery, has lost 8# since last visit. The patient underwent dietary counseling with registered dietician. I have reviewed, discussed and agree with the dietary plan. Patient is trying hard to keep good dietary and behavior modifications. Patient is monitoring portion sizes, food choices and liquid calories. Patient is trying to exercise regularly as much as possible. We discussed how her weight affects her overall health including:  Patient Active Problem List   Diagnosis    Other ovarian dysfunction (luteal est/progest deficiency)    Allergy to eggs    Pelvic pain in female    Vitamin D deficiency    PMS (premenstrual syndrome)    Hypothyroidism    PCOD (polycystic ovarian disease)    Anxiety    ADD (attention deficit disorder)    Bipolar 2 disorder ([facility]    Family history of breast cancer (mother, age 36)   [employer] Asthma    Insomnia- on Belsomra (NEEDS MED CONTRACT)    Allergic rhinitis    Chronic constipation    Moderate right ankle sprain    Hyperlipidemia, unspecified    NAFLD (nonalcoholic fatty liver disease)    Chronic GERD    Migraine    Morbid obesity with BMI of 50.0-59.9, adult (HCC)    Acute lateral meniscus tear of right knee    Gastroparesis    MELONIE (obstructive sleep apnea)    Diabetes mellitus type 2 in obese (HCC)    and importance of weight loss to alleviate those co morbid conditions.    I encouraged the patient

## 2018-03-23 ENCOUNTER — PATIENT MESSAGE (OUTPATIENT)
Dept: FAMILY MEDICINE CLINIC | Age: 38
End: 2018-03-23

## 2018-03-23 DIAGNOSIS — E66.01 MORBID OBESITY WITH BMI OF 50.0-59.9, ADULT (HCC): ICD-10-CM

## 2018-03-23 DIAGNOSIS — E66.9 DIABETES MELLITUS TYPE 2 IN OBESE (HCC): ICD-10-CM

## 2018-03-23 DIAGNOSIS — F98.8 ATTENTION DEFICIT DISORDER (ADD) WITHOUT HYPERACTIVITY: ICD-10-CM

## 2018-03-23 DIAGNOSIS — E11.69 DIABETES MELLITUS TYPE 2 IN OBESE (HCC): ICD-10-CM

## 2018-03-23 DIAGNOSIS — K31.84 GASTROPARESIS: Primary | ICD-10-CM

## 2018-03-23 RX ORDER — METHYLPHENIDATE HYDROCHLORIDE 10 MG/1
TABLET ORAL
Qty: 60 TABLET | Refills: 0 | Status: SHIPPED | OUTPATIENT
Start: 2018-03-23 | End: 2018-04-12 | Stop reason: SDUPTHER

## 2018-03-26 ENCOUNTER — TELEPHONE (OUTPATIENT)
Dept: BARIATRICS/WEIGHT MGMT | Age: 38
End: 2018-03-26

## 2018-03-27 ENCOUNTER — TELEPHONE (OUTPATIENT)
Dept: BARIATRICS/WEIGHT MGMT | Age: 38
End: 2018-03-27

## 2018-03-27 NOTE — TELEPHONE ENCOUNTER
Pt called in to let us know her blood sugar was 175mg/dL this am which is an improvement since the past few mornings. She also mentioned time got away from her last night and she had her protein shake later than normal, around 10:30pm. Encouraged pt to drink this earlier around 9-9:30pm which she normally does. Pt was very thankful and had no questions/concerns at this time.

## 2018-04-02 ENCOUNTER — OFFICE VISIT (OUTPATIENT)
Dept: FAMILY MEDICINE CLINIC | Age: 38
End: 2018-04-02

## 2018-04-02 VITALS
SYSTOLIC BLOOD PRESSURE: 130 MMHG | HEIGHT: 64 IN | WEIGHT: 293 LBS | TEMPERATURE: 98.2 F | RESPIRATION RATE: 12 BRPM | BODY MASS INDEX: 50.02 KG/M2 | HEART RATE: 102 BPM | DIASTOLIC BLOOD PRESSURE: 82 MMHG

## 2018-04-02 DIAGNOSIS — E66.9 DIABETES MELLITUS TYPE 2 IN OBESE (HCC): ICD-10-CM

## 2018-04-02 DIAGNOSIS — E03.9 ACQUIRED HYPOTHYROIDISM: ICD-10-CM

## 2018-04-02 DIAGNOSIS — F51.04 PSYCHOPHYSIOLOGICAL INSOMNIA: ICD-10-CM

## 2018-04-02 DIAGNOSIS — G43.701 CHRONIC MIGRAINE WITHOUT AURA WITH STATUS MIGRAINOSUS, NOT INTRACTABLE: ICD-10-CM

## 2018-04-02 DIAGNOSIS — E11.69 DIABETES MELLITUS TYPE 2 IN OBESE (HCC): ICD-10-CM

## 2018-04-02 DIAGNOSIS — F41.9 ANXIETY: ICD-10-CM

## 2018-04-02 DIAGNOSIS — Z01.818 PREOP EXAMINATION: Primary | ICD-10-CM

## 2018-04-02 DIAGNOSIS — E66.01 MORBID OBESITY WITH BMI OF 50.0-59.9, ADULT (HCC): ICD-10-CM

## 2018-04-02 DIAGNOSIS — K31.84 GASTROPARESIS: ICD-10-CM

## 2018-04-02 DIAGNOSIS — J45.909 MODERATE ASTHMA WITHOUT COMPLICATION, UNSPECIFIED WHETHER PERSISTENT: ICD-10-CM

## 2018-04-02 PROCEDURE — 99244 OFF/OP CNSLTJ NEW/EST MOD 40: CPT | Performed by: FAMILY MEDICINE

## 2018-04-02 PROCEDURE — 93000 ELECTROCARDIOGRAM COMPLETE: CPT | Performed by: FAMILY MEDICINE

## 2018-04-05 ENCOUNTER — TELEPHONE (OUTPATIENT)
Dept: SURGERY | Age: 38
End: 2018-04-05

## 2018-04-05 ENCOUNTER — HOSPITAL ENCOUNTER (OUTPATIENT)
Dept: PREADMISSION TESTING | Age: 38
Discharge: HOME | End: 2018-04-06
Attending: SURGERY | Admitting: SURGERY

## 2018-04-05 VITALS — HEIGHT: 64 IN | WEIGHT: 293 LBS | BODY MASS INDEX: 50.02 KG/M2

## 2018-04-05 RX ORDER — HEPARIN SODIUM 5000 [USP'U]/ML
5000 INJECTION, SOLUTION INTRAVENOUS; SUBCUTANEOUS ONCE
Status: CANCELLED | OUTPATIENT
Start: 2018-04-05 | End: 2018-04-05

## 2018-04-10 ENCOUNTER — PATIENT MESSAGE (OUTPATIENT)
Dept: BARIATRICS/WEIGHT MGMT | Age: 38
End: 2018-04-10

## 2018-04-10 RX ORDER — DULAGLUTIDE 0.75 MG/.5ML
INJECTION, SOLUTION SUBCUTANEOUS
Qty: 2 ML | Refills: 4 | Status: SHIPPED | OUTPATIENT
Start: 2018-04-10 | End: 2018-08-24 | Stop reason: SDUPTHER

## 2018-04-11 RX ORDER — OMEPRAZOLE 40 MG/1
CAPSULE, DELAYED RELEASE ORAL
Qty: 60 CAPSULE | Refills: 4 | Status: SHIPPED | OUTPATIENT
Start: 2018-04-11 | End: 2018-10-25 | Stop reason: SDUPTHER

## 2018-04-12 ENCOUNTER — OFFICE VISIT (OUTPATIENT)
Dept: BARIATRICS/WEIGHT MGMT | Age: 38
End: 2018-04-12

## 2018-04-12 VITALS — BODY MASS INDEX: 50.02 KG/M2 | WEIGHT: 293 LBS | HEIGHT: 64 IN

## 2018-04-12 DIAGNOSIS — Z98.890 H/O PYLOROPLASTY: Primary | ICD-10-CM

## 2018-04-12 PROCEDURE — 99024 POSTOP FOLLOW-UP VISIT: CPT | Performed by: SURGERY

## 2018-04-16 ENCOUNTER — PATIENT MESSAGE (OUTPATIENT)
Dept: FAMILY MEDICINE CLINIC | Age: 38
End: 2018-04-16

## 2018-04-17 RX ORDER — ALBUTEROL SULFATE 90 UG/1
2 AEROSOL, METERED RESPIRATORY (INHALATION) EVERY 6 HOURS PRN
Qty: 1 INHALER | Refills: 0 | Status: SHIPPED | OUTPATIENT
Start: 2018-04-17 | End: 2018-07-29 | Stop reason: SDUPTHER

## 2018-04-26 ENCOUNTER — OFFICE VISIT (OUTPATIENT)
Dept: BARIATRICS/WEIGHT MGMT | Age: 38
End: 2018-04-26

## 2018-04-26 VITALS
HEIGHT: 64 IN | HEART RATE: 99 BPM | BODY MASS INDEX: 50.02 KG/M2 | WEIGHT: 293 LBS | SYSTOLIC BLOOD PRESSURE: 120 MMHG | DIASTOLIC BLOOD PRESSURE: 84 MMHG

## 2018-04-26 DIAGNOSIS — E66.01 MORBID OBESITY WITH BMI OF 50.0-59.9, ADULT (HCC): Primary | ICD-10-CM

## 2018-04-26 DIAGNOSIS — E28.2 PCOS (POLYCYSTIC OVARIAN SYNDROME): ICD-10-CM

## 2018-04-26 DIAGNOSIS — K76.0 NAFLD (NONALCOHOLIC FATTY LIVER DISEASE): ICD-10-CM

## 2018-04-26 DIAGNOSIS — E66.9 DIABETES MELLITUS TYPE 2 IN OBESE (HCC): ICD-10-CM

## 2018-04-26 DIAGNOSIS — K21.9 CHRONIC GERD: ICD-10-CM

## 2018-04-26 DIAGNOSIS — E11.69 DIABETES MELLITUS TYPE 2 IN OBESE (HCC): ICD-10-CM

## 2018-04-26 PROCEDURE — 99214 OFFICE O/P EST MOD 30 MIN: CPT | Performed by: SURGERY

## 2018-04-27 ENCOUNTER — TELEPHONE (OUTPATIENT)
Dept: PULMONOLOGY | Age: 38
End: 2018-04-27

## 2018-05-03 RX ORDER — SUMATRIPTAN 50 MG/1
TABLET, FILM COATED ORAL
Qty: 9 TABLET | Refills: 3 | Status: SHIPPED | OUTPATIENT
Start: 2018-05-03 | End: 2018-12-06 | Stop reason: SDUPTHER

## 2018-05-10 ENCOUNTER — HOSPITAL ENCOUNTER (OUTPATIENT)
Dept: PULMONOLOGY | Age: 38
Discharge: HOME | End: 2018-05-10
Attending: INTERNAL MEDICINE | Admitting: INTERNAL MEDICINE

## 2018-05-10 DIAGNOSIS — J45.909 UNCOMPLICATED ASTHMA: ICD-10-CM

## 2018-05-10 DIAGNOSIS — J45.909 MODERATE ASTHMA WITHOUT COMPLICATION, UNSPECIFIED WHETHER PERSISTENT: ICD-10-CM

## 2018-05-10 PROCEDURE — 94729 DIFFUSING CAPACITY: CPT | Performed by: INTERNAL MEDICINE

## 2018-05-10 PROCEDURE — 94060 EVALUATION OF WHEEZING: CPT | Performed by: INTERNAL MEDICINE

## 2018-05-10 PROCEDURE — 94727 GAS DIL/WSHOT DETER LNG VOL: CPT | Performed by: INTERNAL MEDICINE

## 2018-05-10 PROCEDURE — 94070 EVALUATION OF WHEEZING: CPT | Performed by: INTERNAL MEDICINE

## 2018-05-10 RX ORDER — ALBUTEROL SULFATE 2.5 MG/3ML
2.5 SOLUTION RESPIRATORY (INHALATION) ONCE
Status: COMPLETED | OUTPATIENT
Start: 2018-05-10 | End: 2018-05-10

## 2018-05-10 RX ADMIN — ALBUTEROL SULFATE 2.5 MG: 2.5 SOLUTION RESPIRATORY (INHALATION) at 11:20

## 2018-05-22 RX ORDER — CYCLOBENZAPRINE HCL 10 MG
TABLET ORAL
Qty: 30 TABLET | Refills: 2 | Status: SHIPPED | OUTPATIENT
Start: 2018-05-22 | End: 2018-05-23 | Stop reason: SDUPTHER

## 2018-05-23 ENCOUNTER — OFFICE VISIT (OUTPATIENT)
Dept: PULMONOLOGY | Age: 38
End: 2018-05-23

## 2018-05-23 VITALS
OXYGEN SATURATION: 95 % | BODY MASS INDEX: 50.02 KG/M2 | HEIGHT: 64 IN | WEIGHT: 293 LBS | DIASTOLIC BLOOD PRESSURE: 79 MMHG | RESPIRATION RATE: 20 BRPM | SYSTOLIC BLOOD PRESSURE: 130 MMHG | TEMPERATURE: 96.9 F | HEART RATE: 101 BPM

## 2018-05-23 DIAGNOSIS — J45.20 MILD INTERMITTENT ASTHMA WITHOUT COMPLICATION: ICD-10-CM

## 2018-05-23 DIAGNOSIS — Z01.818 PREOP TESTING: Primary | ICD-10-CM

## 2018-05-23 PROCEDURE — 99245 OFF/OP CONSLTJ NEW/EST HI 55: CPT | Performed by: INTERNAL MEDICINE

## 2018-05-24 ENCOUNTER — OFFICE VISIT (OUTPATIENT)
Dept: BARIATRICS/WEIGHT MGMT | Age: 38
End: 2018-05-24

## 2018-05-24 VITALS
HEIGHT: 64 IN | WEIGHT: 293 LBS | HEART RATE: 86 BPM | BODY MASS INDEX: 50.02 KG/M2 | SYSTOLIC BLOOD PRESSURE: 130 MMHG | DIASTOLIC BLOOD PRESSURE: 84 MMHG

## 2018-05-24 DIAGNOSIS — E66.01 MORBID OBESITY WITH BMI OF 50.0-59.9, ADULT (HCC): ICD-10-CM

## 2018-05-24 DIAGNOSIS — Z98.890: ICD-10-CM

## 2018-05-24 DIAGNOSIS — E11.69 DIABETES MELLITUS TYPE 2 IN OBESE (HCC): ICD-10-CM

## 2018-05-24 DIAGNOSIS — E66.9 DIABETES MELLITUS TYPE 2 IN OBESE (HCC): ICD-10-CM

## 2018-05-24 DIAGNOSIS — K30 DELAYED GASTRIC EMPTYING: Primary | ICD-10-CM

## 2018-05-24 PROCEDURE — 99214 OFFICE O/P EST MOD 30 MIN: CPT | Performed by: SURGERY

## 2018-05-30 ENCOUNTER — HOSPITAL ENCOUNTER (OUTPATIENT)
Dept: OTHER | Age: 38
Discharge: HOME | End: 2018-05-30
Attending: INTERNAL MEDICINE | Admitting: INTERNAL MEDICINE

## 2018-05-30 ENCOUNTER — HOSPITAL ENCOUNTER (OUTPATIENT)
Dept: NUCLEAR MEDICINE | Age: 38
Discharge: HOME | End: 2018-05-30
Admitting: SURGERY

## 2018-05-30 DIAGNOSIS — Z01.818 PREOP TESTING: ICD-10-CM

## 2018-05-30 DIAGNOSIS — K30 FUNCTIONAL DYSPEPSIA: ICD-10-CM

## 2018-05-30 RX ADMIN — Medication 1 MILLICURIE: at 09:00

## 2018-06-05 ENCOUNTER — OFFICE VISIT (OUTPATIENT)
Dept: FAMILY MEDICINE CLINIC | Age: 38
End: 2018-06-05

## 2018-06-05 VITALS
SYSTOLIC BLOOD PRESSURE: 104 MMHG | HEART RATE: 90 BPM | WEIGHT: 293 LBS | BODY MASS INDEX: 50.02 KG/M2 | HEIGHT: 64 IN | RESPIRATION RATE: 12 BRPM | OXYGEN SATURATION: 94 % | TEMPERATURE: 97.9 F | DIASTOLIC BLOOD PRESSURE: 64 MMHG

## 2018-06-05 DIAGNOSIS — F98.8 ATTENTION DEFICIT DISORDER (ADD) WITHOUT HYPERACTIVITY: ICD-10-CM

## 2018-06-05 DIAGNOSIS — E11.69 DIABETES MELLITUS TYPE 2 IN OBESE (HCC): ICD-10-CM

## 2018-06-05 DIAGNOSIS — F31.81 BIPOLAR 2 DISORDER (HCC): ICD-10-CM

## 2018-06-05 DIAGNOSIS — F51.04 PSYCHOPHYSIOLOGICAL INSOMNIA: Primary | ICD-10-CM

## 2018-06-05 DIAGNOSIS — E66.9 DIABETES MELLITUS TYPE 2 IN OBESE (HCC): ICD-10-CM

## 2018-06-05 LAB — HBA1C MFR BLD: 6.4 %

## 2018-06-05 PROCEDURE — 99214 OFFICE O/P EST MOD 30 MIN: CPT | Performed by: FAMILY MEDICINE

## 2018-06-05 PROCEDURE — 83036 HEMOGLOBIN GLYCOSYLATED A1C: CPT | Performed by: FAMILY MEDICINE

## 2018-06-05 RX ORDER — CLOMIPRAMINE HYDROCHLORIDE 50 MG/1
50 CAPSULE ORAL NIGHTLY
Qty: 30 CAPSULE | Refills: 1 | Status: SHIPPED | OUTPATIENT
Start: 2018-06-05 | End: 2019-03-25

## 2018-06-05 RX ORDER — QUETIAPINE 400 MG/1
400 TABLET, FILM COATED, EXTENDED RELEASE ORAL NIGHTLY
Qty: 30 TABLET | Refills: 3 | Status: SHIPPED | OUTPATIENT
Start: 2018-06-05 | End: 2018-09-24 | Stop reason: SDUPTHER

## 2018-06-05 RX ORDER — TIZANIDINE 4 MG/1
4 TABLET ORAL EVERY 8 HOURS PRN
Qty: 90 TABLET | Refills: 1 | Status: SHIPPED | OUTPATIENT
Start: 2018-06-05 | End: 2018-07-25 | Stop reason: SDUPTHER

## 2018-06-05 RX ORDER — METHYLPHENIDATE HYDROCHLORIDE 10 MG/1
TABLET ORAL
Qty: 60 TABLET | Refills: 0 | Status: SHIPPED | OUTPATIENT
Start: 2018-06-05 | End: 2018-08-01 | Stop reason: SDUPTHER

## 2018-06-05 ASSESSMENT — PATIENT HEALTH QUESTIONNAIRE - PHQ9
1. LITTLE INTEREST OR PLEASURE IN DOING THINGS: 0
SUM OF ALL RESPONSES TO PHQ QUESTIONS 1-9: 0
2. FEELING DOWN, DEPRESSED OR HOPELESS: 0
SUM OF ALL RESPONSES TO PHQ9 QUESTIONS 1 & 2: 0

## 2018-06-21 ENCOUNTER — OFFICE VISIT (OUTPATIENT)
Dept: CARDIOLOGY CLINIC | Age: 38
End: 2018-06-21

## 2018-06-21 VITALS
HEART RATE: 111 BPM | DIASTOLIC BLOOD PRESSURE: 70 MMHG | BODY MASS INDEX: 48.82 KG/M2 | WEIGHT: 293 LBS | SYSTOLIC BLOOD PRESSURE: 100 MMHG | OXYGEN SATURATION: 93 % | HEIGHT: 65 IN

## 2018-06-21 DIAGNOSIS — Z01.810 PREOP CARDIOVASCULAR EXAM: Primary | ICD-10-CM

## 2018-06-21 DIAGNOSIS — E66.01 MORBID OBESITY WITH BMI OF 50.0-59.9, ADULT (HCC): ICD-10-CM

## 2018-06-21 DIAGNOSIS — E11.9 TYPE 2 DIABETES MELLITUS WITHOUT COMPLICATION, WITHOUT LONG-TERM CURRENT USE OF INSULIN (HCC): ICD-10-CM

## 2018-06-21 PROCEDURE — 93000 ELECTROCARDIOGRAM COMPLETE: CPT | Performed by: INTERNAL MEDICINE

## 2018-06-21 PROCEDURE — 99204 OFFICE O/P NEW MOD 45 MIN: CPT | Performed by: INTERNAL MEDICINE

## 2018-06-26 ENCOUNTER — OFFICE VISIT (OUTPATIENT)
Dept: SLEEP MEDICINE | Age: 38
End: 2018-06-26

## 2018-06-26 VITALS
RESPIRATION RATE: 16 BRPM | BODY MASS INDEX: 48.82 KG/M2 | HEIGHT: 65 IN | HEART RATE: 109 BPM | TEMPERATURE: 98.1 F | SYSTOLIC BLOOD PRESSURE: 110 MMHG | WEIGHT: 293 LBS | OXYGEN SATURATION: 96 % | DIASTOLIC BLOOD PRESSURE: 70 MMHG

## 2018-06-26 DIAGNOSIS — G47.21 DELAYED SLEEP PHASE SYNDROME: Primary | ICD-10-CM

## 2018-06-26 DIAGNOSIS — G47.33 OSA (OBSTRUCTIVE SLEEP APNEA): ICD-10-CM

## 2018-06-26 PROCEDURE — 99213 OFFICE O/P EST LOW 20 MIN: CPT | Performed by: PSYCHIATRY & NEUROLOGY

## 2018-06-27 ENCOUNTER — OFFICE VISIT (OUTPATIENT)
Dept: BARIATRICS/WEIGHT MGMT | Age: 38
End: 2018-06-27

## 2018-06-27 DIAGNOSIS — Z71.89 INDIVIDUAL COUNSELING ENCOUNTER: Primary | ICD-10-CM

## 2018-06-27 PROCEDURE — 90834 PSYTX W PT 45 MINUTES: CPT | Performed by: SOCIAL WORKER

## 2018-06-28 ENCOUNTER — OFFICE VISIT (OUTPATIENT)
Dept: BARIATRICS/WEIGHT MGMT | Age: 38
End: 2018-06-28

## 2018-06-28 VITALS
HEART RATE: 91 BPM | BODY MASS INDEX: 50.02 KG/M2 | HEIGHT: 64 IN | WEIGHT: 293 LBS | DIASTOLIC BLOOD PRESSURE: 85 MMHG | SYSTOLIC BLOOD PRESSURE: 130 MMHG

## 2018-06-28 DIAGNOSIS — K21.9 CHRONIC GERD: ICD-10-CM

## 2018-06-28 DIAGNOSIS — G47.33 OSA (OBSTRUCTIVE SLEEP APNEA): ICD-10-CM

## 2018-06-28 DIAGNOSIS — K76.0 NAFLD (NONALCOHOLIC FATTY LIVER DISEASE): ICD-10-CM

## 2018-06-28 DIAGNOSIS — E11.69 DIABETES MELLITUS TYPE 2 IN OBESE (HCC): ICD-10-CM

## 2018-06-28 DIAGNOSIS — E66.01 MORBID OBESITY WITH BMI OF 50.0-59.9, ADULT (HCC): Primary | ICD-10-CM

## 2018-06-28 DIAGNOSIS — E66.9 DIABETES MELLITUS TYPE 2 IN OBESE (HCC): ICD-10-CM

## 2018-06-28 PROCEDURE — 99213 OFFICE O/P EST LOW 20 MIN: CPT | Performed by: SURGERY

## 2018-07-05 ENCOUNTER — INITIAL CONSULT (OUTPATIENT)
Dept: BARIATRICS/WEIGHT MGMT | Age: 38
End: 2018-07-05

## 2018-07-05 DIAGNOSIS — E66.01 MORBID OBESITY WITH BMI OF 50.0-59.9, ADULT (HCC): ICD-10-CM

## 2018-07-05 DIAGNOSIS — F41.9 ANXIETY: ICD-10-CM

## 2018-07-05 DIAGNOSIS — F98.8 ATTENTION DEFICIT DISORDER (ADD) IN ADULT: ICD-10-CM

## 2018-07-05 DIAGNOSIS — F31.81 BIPOLAR II DISORDER (HCC): Primary | ICD-10-CM

## 2018-07-05 PROCEDURE — 90791 PSYCH DIAGNOSTIC EVALUATION: CPT | Performed by: PSYCHOLOGIST

## 2018-07-05 PROCEDURE — 99999 PR OFFICE/OUTPT VISIT,PROCEDURE ONLY: CPT | Performed by: PSYCHOLOGIST

## 2018-07-05 PROCEDURE — 96101 PR PSYCHOLOGIC TESTING BY PSYCH/PHYS: CPT | Performed by: PSYCHOLOGIST
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
## 2018-07-19 ENCOUNTER — OFFICE VISIT (OUTPATIENT)
Dept: BARIATRICS/WEIGHT MGMT | Age: 38
End: 2018-07-19

## 2018-07-19 VITALS
HEIGHT: 64 IN | BODY MASS INDEX: 50.02 KG/M2 | WEIGHT: 293 LBS | HEART RATE: 93 BPM | DIASTOLIC BLOOD PRESSURE: 89 MMHG | SYSTOLIC BLOOD PRESSURE: 135 MMHG

## 2018-07-19 DIAGNOSIS — E66.01 MORBID OBESITY WITH BMI OF 50.0-59.9, ADULT (HCC): Primary | ICD-10-CM

## 2018-07-19 PROCEDURE — 99213 OFFICE O/P EST LOW 20 MIN: CPT | Performed by: SURGERY
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
## 2018-07-25 RX ORDER — TIZANIDINE 4 MG/1
TABLET ORAL
Qty: 90 TABLET | Refills: 0 | Status: SHIPPED | OUTPATIENT
Start: 2018-07-25 | End: 2018-08-28 | Stop reason: SDUPTHER

## 2018-07-30 RX ORDER — TIZANIDINE 4 MG/1
TABLET ORAL
Qty: 90 TABLET | Refills: 0 | Status: SHIPPED | OUTPATIENT
Start: 2018-07-30 | End: 2018-09-28

## 2018-07-31 ENCOUNTER — PATIENT MESSAGE (OUTPATIENT)
Dept: FAMILY MEDICINE CLINIC | Age: 38
End: 2018-07-31

## 2018-07-31 DIAGNOSIS — F98.8 ATTENTION DEFICIT DISORDER (ADD) WITHOUT HYPERACTIVITY: ICD-10-CM

## 2018-08-01 RX ORDER — METHYLPHENIDATE HYDROCHLORIDE 10 MG/1
TABLET ORAL
Qty: 60 TABLET | Refills: 0 | Status: SHIPPED | OUTPATIENT
Start: 2018-08-01 | End: 2018-09-01

## 2018-08-01 NOTE — TELEPHONE ENCOUNTER
From: Umesh Begum  To: Sierra Moe MD  Sent: 7/31/2018 11:17 PM EDT  Subject: Prescription Question    Hello! I need a refill on my Ritalin. I'm OUT. Forgot you needed to call it in. Or whatever is done these days. .. :p

## 2018-08-23 ENCOUNTER — OFFICE VISIT (OUTPATIENT)
Dept: BARIATRICS/WEIGHT MGMT | Age: 38
End: 2018-08-23

## 2018-08-23 VITALS
HEIGHT: 64 IN | SYSTOLIC BLOOD PRESSURE: 144 MMHG | DIASTOLIC BLOOD PRESSURE: 89 MMHG | WEIGHT: 293 LBS | HEART RATE: 116 BPM | BODY MASS INDEX: 50.02 KG/M2

## 2018-08-23 DIAGNOSIS — E66.01 MORBID OBESITY WITH BMI OF 50.0-59.9, ADULT (HCC): Primary | ICD-10-CM

## 2018-08-23 PROCEDURE — 99213 OFFICE O/P EST LOW 20 MIN: CPT | Performed by: SURGERY

## 2018-08-23 NOTE — PATIENT INSTRUCTIONS
Patient received dietary handouts and education. Plan/Recommendations:   1. Continue plan  2. Watch high fat meats - hinkle / sausage  3.   Continue to watch portions

## 2018-08-23 NOTE — PROGRESS NOTES
 Arthritis Mother     High Blood Pressure Father     Arthritis Father     Glaucoma Father     Migraines Brother     Seizures Brother     Mental Illness Brother         undiagnosed but tried to commit suicide    Diabetes Paternal Grandfather     Glaucoma Paternal Grandfather     Coronary Art Dis Paternal Grandfather     Arthritis Paternal Grandfather     Cancer Maternal Grandmother     High Blood Pressure Maternal Grandfather     Cancer Paternal Grandmother     Arthritis Paternal Grandmother      Social History   Substance Use Topics    Smoking status: Never Smoker    Smokeless tobacco: Never Used      Comment: congratulated on nonsmoking status.  Alcohol use Yes      Comment: OCC     I counseled the patient on the importance of not smoking and risks of ETOH. Allergies   Allergen Reactions    Dilaudid [Hydromorphone Hcl] Anaphylaxis     Migraines    Cipro Xr     Ciprofloxacin Hives     Nausea,diarrhea    Eggs [Egg White] Other (See Comments)     Diarrhea at times    Tape [Adhesive Tape]      Blistering  Can use paper tape     Vitals:    08/23/18 1408   BP: (!) 144/89   Pulse: 116   Weight: (!) 324 lb 9.6 oz (147.2 kg)   Height: 5' 4.25\" (1.632 m)       Body mass index is 55.28 kg/m².       Current Outpatient Prescriptions:     methylphenidate (RITALIN) 10 MG tablet, TAKE 1 TABLET BY MOUTH TWO TIMES A DAY ., Disp: 60 tablet, Rfl: 0    VENTOLIN  (90 Base) MCG/ACT inhaler, INHALE 2 PUFFS BY MOUTH EVERY SIX HOURS AS NEEDED FOR WHEEZING, Disp: 18 g, Rfl: 0    tiZANidine (ZANAFLEX) 4 MG tablet, TAKE 1 TABLET BY MOUTH EVERY EIGHT HOURS AS NEEDED FOR HEADACHE, Disp: 90 tablet, Rfl: 0    clomiPRAMINE (ANAFRANIL) 50 MG capsule, Take 1 capsule by mouth nightly, Disp: 30 capsule, Rfl: 1    QUEtiapine (SEROQUEL XR) 400 MG extended release tablet, Take 1 tablet by mouth nightly, Disp: 30 tablet, Rfl: 3    levothyroxine (SYNTHROID) 50 MCG tablet, TAKE 1 TABLET BY MOUTH TWO TIMES A DAY , 3 capsules by mouth daily. , Disp: , Rfl:     tiZANidine (ZANAFLEX) 4 MG tablet, TAKE 1 TABLET BY MOUTH EVERY EIGHT HOURS AS NEEDED FOR HEADACHE, Disp: 90 tablet, Rfl: 0    diclofenac (VOLTAREN) 50 MG EC tablet, TAKE 1 TABLET BY MOUTH TWO TIMES A DAY WITH MEALS, Disp: 60 tablet, Rfl: 0      Review of Systems - History obtained from the patient  General ROS: negative  Psychological ROS: negative  Endocrine ROS: negative  Respiratory ROS: negative  Cardiovascular ROS: negative  Gastrointestinal ROS:negative  Genito-Urinary ROS: negative  Musculoskeletal ROS: negative   Skin ROS: negative    Physical Exam   Vitals Reviewed   Constitutional: Patient is oriented to person, place, and time. Patient appears well-developed and well-nourished. Patient is active and cooperative. Non-toxic appearance. No distress. Neck: Trachea normal and normal range of motion. No JVD present. Pulmonary/Chest: Effort normal. No accessory muscle usage or stridor. No apnea. No respiratory distress. Cardiovascular: Normal rate and no JVD. Abdominal: Normal appearance. Patient exhibits no distension. Abdomen is soft, obese, non tender. Musculoskeletal: Normal range of motion. Patient exhibits no edema. Neurological: Patient is alert and oriented to person, place, and time. Patient has normal strength. GCS eye subscore is 4. GCS verbal subscore is 5. GCS motor subscore is 6. Skin: Skin is warm and dry. No abrasion and no rash noted. Patient is not diaphoretic. No cyanosis or erythema. Psychiatric: Patient has a normal mood and affect. Speech is normal and behavior is normal. Cognition and memory are normal.       A/P    Lisa Khalil is 34 y.o. female, Body mass index is 55.28 kg/m². pre surgery, has lost 11# since last visit. The patient underwent dietary counseling with registered dietician. I have reviewed, discussed and agree with the dietary plan. Patient is trying hard to keep good dietary and behavior modifications.

## 2018-08-27 RX ORDER — LANCETS
EACH MISCELLANEOUS
Qty: 100 EACH | Refills: 4 | Status: SHIPPED | OUTPATIENT
Start: 2018-08-27 | End: 2019-06-21 | Stop reason: SDUPTHER

## 2018-08-28 ENCOUNTER — PATIENT MESSAGE (OUTPATIENT)
Dept: FAMILY MEDICINE CLINIC | Age: 38
End: 2018-08-28

## 2018-08-28 DIAGNOSIS — F98.8 ATTENTION DEFICIT DISORDER (ADD) WITHOUT HYPERACTIVITY: ICD-10-CM

## 2018-08-28 RX ORDER — TIZANIDINE 4 MG/1
TABLET ORAL
Qty: 90 TABLET | Refills: 0 | Status: SHIPPED | OUTPATIENT
Start: 2018-08-28 | End: 2018-10-25 | Stop reason: SDUPTHER

## 2018-08-28 RX ORDER — METHYLPHENIDATE HYDROCHLORIDE 10 MG/1
TABLET ORAL
Qty: 60 TABLET | Refills: 0 | Status: SHIPPED | OUTPATIENT
Start: 2018-08-28 | End: 2018-09-24 | Stop reason: SDUPTHER

## 2018-09-06 ENCOUNTER — OFFICE VISIT (OUTPATIENT)
Dept: BARIATRICS/WEIGHT MGMT | Age: 38
End: 2018-09-06

## 2018-09-06 VITALS
HEIGHT: 64 IN | SYSTOLIC BLOOD PRESSURE: 138 MMHG | BODY MASS INDEX: 50.02 KG/M2 | DIASTOLIC BLOOD PRESSURE: 92 MMHG | HEART RATE: 106 BPM | WEIGHT: 293 LBS

## 2018-09-06 DIAGNOSIS — E11.69 DIABETES MELLITUS TYPE 2 IN OBESE (HCC): ICD-10-CM

## 2018-09-06 DIAGNOSIS — G47.33 OSA (OBSTRUCTIVE SLEEP APNEA): ICD-10-CM

## 2018-09-06 DIAGNOSIS — E66.9 DIABETES MELLITUS TYPE 2 IN OBESE (HCC): ICD-10-CM

## 2018-09-06 DIAGNOSIS — E66.01 MORBID OBESITY WITH BMI OF 50.0-59.9, ADULT (HCC): Primary | ICD-10-CM

## 2018-09-06 DIAGNOSIS — E78.5 HYPERLIPIDEMIA, UNSPECIFIED HYPERLIPIDEMIA TYPE: ICD-10-CM

## 2018-09-06 PROCEDURE — 99214 OFFICE O/P EST MOD 30 MIN: CPT | Performed by: SURGERY

## 2018-09-06 NOTE — PROGRESS NOTES
[facility]) Physicians   General & Laparoscopic Surgery  [employer]       HPI:     Calista Leyva is a very pleasant 34 y.o. female with Body mass index is 55.63 kg/m². , Pre-Surgery. Pre-operative clearance and work up pending. Working hard to keep good dietary habits as well level of activity. Patient denies any nausea, vomiting, fevers, chills, shortness of breath, chest pain, cough, constipation or difficulty urinating. Past Medical History:   Diagnosis Date    Anxiety     Arthritis     knees, ankle, wrists    Asthma     Back pain     Body piercing     retained in nose/ears    Chronic constipation 2014    Depression 2011    Diabetes mellitus ([address]     Fatty liver     GERD (gastroesophageal reflux disease)     Hyperlipidemia     Hypothyroidism 2011    Migraine     Obesity     Other ovarian dysfunction (luteal est/progest deficiency) 2011    PCOS (polycystic ovarian syndrome)     Prolonged emergence from general anesthesia     Sleep apnea     Vitamin D deficiency 2011     Past Surgical History:   Procedure Laterality Date    BREAST LUMPECTOMY      left (cyst)     SECTION      1    ENDOMETRIAL ABLATION      Dr Rena De Oliveira Right 13    Dr Ponce Payment; REMOVAL POSTERIOR CALCANEAL HEEL SPUR RIGHT FOOT    HYSTERECTOMY      uterus    OTHER SURGICAL HISTORY  2018    ROBOTIC ASSISTED LAPAROSCOPIC PYLOROPLASTY, INTRA ABDOMINAL FLAP OMENTAL FLAP, INTRAOPERATIVE EGD WITH BIOPSY    PARTIAL HYSTERECTOMY  10/15/13    Dr Kate Wilhelm benign pathology.     TONSILLECTOMY  1988    TUBAL LIGATION       Family History   Problem Relation Age of Onset    Diabetes Mother     Asthma Mother     Cancer Mother 36        breast    High Blood Pressure Mother     High Cholesterol Mother     Clotting Disorder Mother     Migraines Mother     Substance Abuse Mother     Mental Illness Mother     Other Mother         blood clots  Arthritis Mother     High Blood Pressure Father     Arthritis Father     Glaucoma Father     Migraines Brother     Seizures Brother     Mental Illness Brother         undiagnosed but tried to commit suicide    Diabetes Paternal Grandfather     Glaucoma Paternal Grandfather     Coronary Art Dis Paternal Grandfather     Arthritis Paternal Grandfather     Cancer Maternal Grandmother     High Blood Pressure Maternal Grandfather     Cancer Paternal Grandmother     Arthritis Paternal Grandmother      Social History   Substance Use Topics    Smoking status: Never Smoker    Smokeless tobacco: Never Used      Comment: congratulated on nonsmoking status.  Alcohol use Yes      Comment: OCC     I counseled the patient on the importance of not smoking and risks of ETOH. Allergies   Allergen Reactions    Dilaudid [Hydromorphone Hcl] Anaphylaxis     Migraines    Cipro Xr     Ciprofloxacin Hives     Nausea,diarrhea    Eggs [Egg White] Other (See Comments)     Diarrhea at times    Tape [Adhesive Tape]      Blistering  Can use paper tape     Vitals:    09/06/18 1030   BP: (!) 138/92   Pulse: 106   Weight: (!) 326 lb 9.6 oz (148.1 kg)   Height: 5' 4.25\" (1.632 m)       Body mass index is 55.63 kg/m².       Current Outpatient Prescriptions:     SUMAtriptan (IMITREX) 50 MG tablet, TAKE 1 TABLET BY MOUTH ONE TIME A DAY AS NEEDED FOR MIGRAINE, Disp: 9 tablet, Rfl: 5    ALPRAZolam (XANAX) 2 MG tablet, TAKE 1 TABLET BY MOUTH THREE TIMES A DAY AS NEEDED FOR SLEEP AND ANXIETY, Disp: 90 tablet, Rfl: 2    tiZANidine (ZANAFLEX) 4 MG tablet, TAKE 1 TABLET BY MOUTH EVERY EIGHT HOURS AS NEEDED FOR HEADACHE, Disp: 90 tablet, Rfl: 0    methylphenidate (RITALIN) 10 MG tablet, TAKE 1 TABLET BY MOUTH TWO TIMES A DAY ., Disp: 60 tablet, Rfl: 0    ACCU-CHEK FASTCLIX LANCETS MISC, TEST ONCE DAILY, Disp: 100 each, Rfl: 4    diclofenac (VOLTAREN) 50 MG EC tablet, TAKE 1 TABLET BY MOUTH TWO TIMES A DAY WITH MEALS, Disp: 60

## 2018-09-11 ENCOUNTER — TELEPHONE (OUTPATIENT)
Dept: BARIATRICS/WEIGHT MGMT | Age: 38
End: 2018-09-11

## 2018-09-11 NOTE — TELEPHONE ENCOUNTER
[employer] approval for sleeve submitted  Cpt [id number]  DOS 10/9/18 @ [facility] with Dr. Saul Bene

## 2018-09-13 ENCOUNTER — OFFICE VISIT (OUTPATIENT)
Dept: BARIATRICS/WEIGHT MGMT | Age: 38
End: 2018-09-13

## 2018-09-13 VITALS
DIASTOLIC BLOOD PRESSURE: 86 MMHG | SYSTOLIC BLOOD PRESSURE: 134 MMHG | RESPIRATION RATE: 16 BRPM | WEIGHT: 293 LBS | HEART RATE: 104 BPM | OXYGEN SATURATION: 97 % | HEIGHT: 64 IN | BODY MASS INDEX: 50.02 KG/M2

## 2018-09-13 DIAGNOSIS — E66.01 MORBID OBESITY WITH BMI OF 50.0-59.9, ADULT (HCC): ICD-10-CM

## 2018-09-13 DIAGNOSIS — E66.9 DIABETES MELLITUS TYPE 2 IN OBESE (HCC): ICD-10-CM

## 2018-09-13 DIAGNOSIS — E11.69 DIABETES MELLITUS TYPE 2 IN OBESE (HCC): ICD-10-CM

## 2018-09-13 DIAGNOSIS — G47.33 OSA (OBSTRUCTIVE SLEEP APNEA): ICD-10-CM

## 2018-09-13 DIAGNOSIS — E78.5 HYPERLIPIDEMIA, UNSPECIFIED HYPERLIPIDEMIA TYPE: Primary | ICD-10-CM

## 2018-09-13 DIAGNOSIS — K76.0 NAFLD (NONALCOHOLIC FATTY LIVER DISEASE): ICD-10-CM

## 2018-09-13 DIAGNOSIS — K21.9 CHRONIC GERD: ICD-10-CM

## 2018-09-13 PROCEDURE — 99213 OFFICE O/P EST LOW 20 MIN: CPT | Performed by: NURSE PRACTITIONER

## 2018-09-13 ASSESSMENT — ENCOUNTER SYMPTOMS
DIARRHEA: 0
SHORTNESS OF BREATH: 0
EYES NEGATIVE: 1
NAUSEA: 1
ALLERGIC/IMMUNOLOGIC NEGATIVE: 1
ABDOMINAL PAIN: 0
COUGH: 0
RESPIRATORY NEGATIVE: 1

## 2018-09-13 NOTE — PROGRESS NOTES
Myla lost 3.2 lbs over past week. After dietary evaluation and education, patient is considered to be a good surgical candidate from a dietary perspective. Patient was educated on sleeve dietary protocol. Pre-operative and post-operative diet guidelines were discussed and written information was provided. Nectar and Unjury protein supplements were purchased. Vitamin regimen with Bariatric Fusion vitamins was explained, and patient purchased a 1 month supply at this visit. Importance of vitamin compliance was discussed and potential of vitamin deficiencies was reviewed. Encouraged continued physical activity. Patient signed agreement stating they are committed to lifelong follow up, smoking and alcohol cessation, vitamin compliance, and 2 week pre-operative dietary protocol.

## 2018-09-13 NOTE — PROGRESS NOTES
tablet, Rfl: 0    ACCU-CHEK FASTCLIX LANCETS MISC, TEST ONCE DAILY, Disp: 100 each, Rfl: 4    diclofenac (VOLTAREN) 50 MG EC tablet, TAKE 1 TABLET BY MOUTH TWO TIMES A DAY WITH MEALS, Disp: 60 tablet, Rfl: 2    ACCU-CHEK FASTCLIX LANCETS MISC, TEST ONCE DAILY, Disp: 100 each, Rfl: 2    TRULICITY 1.17 VH/9.9GN SOPN, INJECT THE CONTENTS OF 1 PEN (0.75 MG) INTO THE SKIN ONCE A WEEK, Disp: 2 mL, Rfl: 2    clomiPRAMINE (ANAFRANIL) 50 MG capsule, TAKE 2 CAPSULES BY MOUTH AT BEDTIME , Disp: 60 capsule, Rfl: 2    tiZANidine (ZANAFLEX) 4 MG tablet, TAKE 1 TABLET BY MOUTH EVERY EIGHT HOURS AS NEEDED FOR HEADACHE, Disp: 90 tablet, Rfl: 0    VENTOLIN  (90 Base) MCG/ACT inhaler, INHALE 2 PUFFS BY MOUTH EVERY SIX HOURS AS NEEDED FOR WHEEZING, Disp: 18 g, Rfl: 0    clomiPRAMINE (ANAFRANIL) 50 MG capsule, Take 1 capsule by mouth nightly, Disp: 30 capsule, Rfl: 1    QUEtiapine (SEROQUEL XR) 400 MG extended release tablet, Take 1 tablet by mouth nightly, Disp: 30 tablet, Rfl: 3    levothyroxine (SYNTHROID) 50 MCG tablet, TAKE 1 TABLET BY MOUTH TWO TIMES A DAY , Disp: 60 tablet, Rfl: 5    busPIRone (BUSPAR) 30 MG tablet, TAKE 1 AND 1/2 TABLETS BY MOUTH TWO TIMES A DAY , Disp: 90 tablet, Rfl: 5    lamoTRIgine (LAMICTAL) 200 MG tablet, TAKE 1 TABLET BY MOUTH TWO TIMES A DAY , Disp: 60 tablet, Rfl: 5    SUMAtriptan (IMITREX) 50 MG tablet, TAKE 1 TABLET BY MOUTH ONE TIME A DAY AS NEEDED FOR MIGRAINE, Disp: 9 tablet, Rfl: 3    atorvastatin (LIPITOR) 40 MG tablet, TAKE 1 TABLET BY MOUTH ONE TIME A DAY , Disp: 90 tablet, Rfl: 1    omeprazole (PRILOSEC) 40 MG delayed release capsule, TAKE 1 CAPSULE BY MOUTH TWO TIMES A DAY, Disp: 60 capsule, Rfl: 4    metoclopramide (REGLAN) 10 MG tablet, Take 1 tablet by mouth 3 times daily (with meals), Disp: 120 tablet, Rfl: 3    metFORMIN (GLUCOPHAGE) 500 MG tablet, TAKE 1 TABLET BY MOUTH THREE TIMES A DAY WITH MEALS, Disp: 90 tablet, Rfl: 4    Blood Glucose Monitoring Suppl (ACCU-CHEK SOURAV PLUS) w/Device KIT, USE TO TEST BLOOD GLUCOSE , Disp: 1 kit, Rfl: 0    SYMBICORT 160-4.5 MCG/ACT AERO, INHALE 2 PUFFS BY MOUTH TWO TIMES A DAY , Disp: 10.2 g, Rfl: 4    glucose blood VI test strips (ACCU-CHEK SOURAV PLUS) strip, 1 each by In Vitro route daily As needed. , Disp: 100 each, Rfl: 3    ipratropium-albuterol (DUONEB) 0.5-2.5 (3) MG/3ML SOLN nebulizer solution, Inhale 3 mLs into the lungs every 4 hours. , Disp: 360 mL, Rfl: 3    calcium carbonate (OSCAL) 500 MG TABS tablet, Take 500 mg by mouth daily. , Disp: , Rfl:     mometasone (NASONEX) 50 MCG/ACT nasal spray, 2 sprays by Nasal route daily. , Disp: 1 Inhaler, Rfl: 3    Cetirizine HCl (ZYRTEC ALLERGY) 10 MG CAPS, Take 1 tablet by mouth., Disp: 30 capsule, Rfl:     CHELATED POTASSIUM 99 MG TABS, Take 1 tablet by mouth daily. , Disp: , Rfl:     Cholecalciferol (VITAMIN D) 1000 UNIT CAPS, Take 3 capsules by mouth daily. , Disp: , Rfl:     Review of Systems   Constitutional: Negative. Negative for chills and fever. HENT: Negative. Eyes: Negative. Respiratory: Negative. Negative for cough and shortness of breath. Cardiovascular: Negative. Gastrointestinal: Positive for nausea. Negative for abdominal pain and diarrhea. Baseline intermittent nausea due to h/o gastroparesis. Endocrine: Negative. Genitourinary: Negative. Musculoskeletal: Negative. Skin: Negative. Allergic/Immunologic: Negative. Neurological: Negative. Hematological: Negative. Psychiatric/Behavioral: Negative. Objective:     Physical Exam   Constitutional: She is oriented to person, place, and time. She appears well-developed and well-nourished. HENT:   Head: Normocephalic and atraumatic. Eyes: Pupils are equal, round, and reactive to light. Conjunctivae are normal.   Neck: Normal range of motion. Neck supple. Cardiovascular: Normal heart sounds.     Pulmonary/Chest: Effort normal and breath sounds normal. No respiratory distress. She has no wheezes. She has no rales. Abdominal: Soft. Bowel sounds are normal. She exhibits no distension. There is no tenderness. There is no rebound. Musculoskeletal: Normal range of motion. Neurological: She is alert and oriented to person, place, and time. Skin: Skin is warm and dry. Psychiatric: She has a normal mood and affect. Her speech is normal and behavior is normal. Judgment and thought content normal. Cognition and memory are normal.   Vitals reviewed. Assessment and Plan:   Patient is here for their preoperative education group visit for sleeve gastrectomy. The patient is down 3.2 lbs today. The patient's current Body mass index is 55.08 kg/m². (9/13/18). She is making good dietary and behavior modifications and is considered to be a good surgical candidate. Patient has a diagnosis of diabetes. Reviewed the importance of checking blood sugars preoperatively and postoperatively. In addition, following up with their PCP or endocrinologist for medication adjustments as needed. Discussed the fact that they will be required to crush or open their medications for the first two weeks after surgery and reviewed those medications that can not be crushed. Patient has a diagnosis of hyperlipidemia. Discussed the benefits of weight loss and dietary changes on lipids and cholesterol. Discussed the fact that they will be required to crush or open their medications for the first two weeks after surgery and reviewed those medications that can not be crushed. Patient has a diagnosis of obstructive sleep apnea and uses a CPAP for sleep. Discussed that weight loss can assist with improving sleep apnea symptoms. Explained to patient to make sure they bring their CPAP with them to the hospital for their surgery. Patient has a diagnosis of chronic GERD and takes a PPI. Discussed the benefits of weight loss and dietary changes on acid reflux.  However, they will most likely need to continue their medication short term after surgery as they adjust to the new diet. Discussed the fact that they will be required to crush or open their medications for the first two weeks after surgery and reviewed those medications that can not be crushed. Patient received instructions from the registered dietitian in reference to the two week preoperative diet and the four phases of their postoperative diet. In addition I reviewed these instructions and stressed the importance of following these recommendations for their safety. Patient completed the preoperative class where they were provided with education related to their bariatric surgery, common surgical complications, medications preoperatively & postoperatively, special concerns related to bariatric surgery postoperatively, vitamin supplementation, patient agreement, PAT & scheduling, hospital course, wellness discovery program and what to do in the case of an emergency postoperatively. The dietitians reviewed all preoperative and postoperative diet instructions. Patient was given the opportunity to ask questions during the group visit and these questions were answered by myself and/or the dietitian.

## 2018-09-14 ENCOUNTER — PATIENT MESSAGE (OUTPATIENT)
Dept: FAMILY MEDICINE CLINIC | Age: 38
End: 2018-09-14

## 2018-09-24 ENCOUNTER — OFFICE VISIT (OUTPATIENT)
Dept: FAMILY MEDICINE CLINIC | Age: 38
End: 2018-09-24
Payer: COMMERCIAL

## 2018-09-24 VITALS
BODY MASS INDEX: 50.02 KG/M2 | TEMPERATURE: 98.6 F | DIASTOLIC BLOOD PRESSURE: 74 MMHG | HEIGHT: 64 IN | HEART RATE: 86 BPM | RESPIRATION RATE: 12 BRPM | WEIGHT: 293 LBS | SYSTOLIC BLOOD PRESSURE: 130 MMHG

## 2018-09-24 DIAGNOSIS — K31.84 GASTROPARESIS: ICD-10-CM

## 2018-09-24 DIAGNOSIS — Z01.818 PREOP EXAMINATION: Primary | ICD-10-CM

## 2018-09-24 DIAGNOSIS — K76.0 NAFLD (NONALCOHOLIC FATTY LIVER DISEASE): ICD-10-CM

## 2018-09-24 DIAGNOSIS — E66.9 DIABETES MELLITUS TYPE 2 IN OBESE (HCC): ICD-10-CM

## 2018-09-24 DIAGNOSIS — E11.69 DIABETES MELLITUS TYPE 2 IN OBESE (HCC): ICD-10-CM

## 2018-09-24 DIAGNOSIS — Z01.818 PREOP EXAMINATION: ICD-10-CM

## 2018-09-24 DIAGNOSIS — G47.33 OSA (OBSTRUCTIVE SLEEP APNEA): ICD-10-CM

## 2018-09-24 DIAGNOSIS — K27.9 PUD (PEPTIC ULCER DISEASE): ICD-10-CM

## 2018-09-24 DIAGNOSIS — E66.01 MORBID OBESITY WITH BMI OF 50.0-59.9, ADULT (HCC): ICD-10-CM

## 2018-09-24 DIAGNOSIS — E03.9 ACQUIRED HYPOTHYROIDISM: ICD-10-CM

## 2018-09-24 DIAGNOSIS — J45.20 MILD INTERMITTENT ASTHMA WITHOUT COMPLICATION: ICD-10-CM

## 2018-09-24 DIAGNOSIS — F31.81 BIPOLAR 2 DISORDER (HCC): ICD-10-CM

## 2018-09-24 PROCEDURE — 99243 OFF/OP CNSLTJ NEW/EST LOW 30: CPT | Performed by: FAMILY MEDICINE

## 2018-09-24 RX ORDER — QUETIAPINE 400 MG/1
400 TABLET, FILM COATED, EXTENDED RELEASE ORAL NIGHTLY
Qty: 30 TABLET | Refills: 2 | Status: SHIPPED | OUTPATIENT
Start: 2018-09-24 | End: 2018-12-25 | Stop reason: SDUPTHER

## 2018-09-24 ASSESSMENT — ENCOUNTER SYMPTOMS
GASTROINTESTINAL NEGATIVE: 1
EYES NEGATIVE: 1
RESPIRATORY NEGATIVE: 1

## 2018-09-24 NOTE — PROGRESS NOTES
Subjective:      Asif Boyd is a 34 y.o.  female who presents to the office today for a preoperative consultation at the request of surgeon Dr Frank Novoa who plans on performing laparoscopic sleeve gastrectomy on October 9. This consultation is requested for the specific conditions prompting preoperative evaluation (i.e. because of potential affect on operative risk): Has multiple chronic med problems, most are related to obesity. diabetes mellitus: on metformin and Tulicity. No hypoglycemnia. Lab Results   Component Value Date    LABA1C 6.4 06/05/2018     Lab Results   Component Value Date    .3 03/15/2018      ADHD, Bipolar, anxiety:  On several psych meds, but these have been stable and doses unchanged. Still struggles with sleep. Has MELONIE, but she says it was VERY mild. AHI was only about 5. Hypothryoid: on synthroid 50. TSH 2.05; 3/15/18. On atorvastatin due to being type 2 diabetes over the age of 34. Has asthma. Controlled with symbicort. Last flare was a long time ago, but the fall has made her use albuterol more. No steroids recently. Uses allergy meds daily: nasal steroids and zyrtec. Patient Active Problem List   Diagnosis    Allergy to eggs    Vitamin D deficiency    PMS (premenstrual syndrome)    Hypothyroidism    PCOD (polycystic ovarian disease)    Anxiety    ADD (attention deficit disorder)    Bipolar 2 disorder [address] [address]    Family history of breast cancer (mother, age 36)   [facility] Ast[facility]    Insomnia- on Belsomra    Allergic rhinitis    Chronic constipation    Hyperlipidemia, unspecified    NAFLD (nonalcoholic fatty liver disease)    Chronic GERD    Migraine    Morbid obesity with BMI of 50.0-59.9, adult (HCC)    Gastroparesis    MELONIE (obstructive sleep apnea)    Diabetes mellitus type 2 in obese (HCC)    PUD (peptic ulcer disease)        Duration of problem: chronic obesity.   Assoc sx are: comorbid type 2 diabetes. Alleviating factors are: structured weight loss programs have not produced significant weight reductions    Planned anesthesia is General.  The patient has the following known anesthesia issues: no prior complications. Patient has a bleeding risk of : no recent abnormal bleeding  Patient does not have objection to receiving blood products if needed. Past Medical History:   Diagnosis Date    Anxiety     Arthritis     knees, ankle, wrists    Asthma     Back pain     Body piercing     retained in nose/ears    Chronic constipation 2014    Depression 2011    Diabetes mellitus ([address]     Fatty liver     GERD (gastroesophageal reflux disease)     Hyperlipidemia     Hypothyroidism 2011    Migraine     Obesity     Other ovarian dysfunction (luteal est/progest deficiency) 2011    PCOS (polycystic ovarian syndrome)     Prolonged emergence from general anesthesia     Sleep apnea     Vitamin D deficiency 2011     Past Surgical History:   Procedure Laterality Date    BREAST LUMPECTOMY      left (cyst)     SECTION      1    ENDOMETRIAL ABLATION      Dr Vandana Ramsey Right 13    Dr Reina Lerma; REMOVAL POSTERIOR CALCANEAL HEEL SPUR RIGHT FOOT    HYSTERECTOMY      uterus    OTHER SURGICAL HISTORY  2018    ROBOTIC ASSISTED LAPAROSCOPIC PYLOROPLASTY, INTRA ABDOMINAL FLAP OMENTAL FLAP, INTRAOPERATIVE EGD WITH BIOPSY    PARTIAL HYSTERECTOMY  10/15/13    Dr Von Hines benign pathology.     TONSILLECTOMY      TUBAL LIGATION       Family History   Problem Relation Age of Onset    Diabetes Mother     Asthma Mother     Cancer Mother 36        breast    High Blood Pressure Mother     High Cholesterol Mother     Clotting Disorder Mother    [place] Migraines Mother     Substance Abuse Mother     Mental Illness Mother     Other Mother         blood clots    Arthritis Mother     High Blood Pressure Father     Arthritis Father     Glaucoma MG/0.5ML SOPN INJECT THE CONTENTS OF 1 PEN (0.75 MG) INTO THE SKIN ONCE A WEEK 2 mL 2    clomiPRAMINE (ANAFRANIL) 50 MG capsule TAKE 2 CAPSULES BY MOUTH AT BEDTIME  60 capsule 2    tiZANidine (ZANAFLEX) 4 MG tablet TAKE 1 TABLET BY MOUTH EVERY EIGHT HOURS AS NEEDED FOR HEADACHE 90 tablet 0    VENTOLIN  (90 Base) MCG/ACT inhaler INHALE 2 PUFFS BY MOUTH EVERY SIX HOURS AS NEEDED FOR WHEEZING 18 g 0    clomiPRAMINE (ANAFRANIL) 50 MG capsule Take 1 capsule by mouth nightly 30 capsule 1    levothyroxine (SYNTHROID) 50 MCG tablet TAKE 1 TABLET BY MOUTH TWO TIMES A DAY  60 tablet 5    busPIRone (BUSPAR) 30 MG tablet TAKE 1 AND 1/2 TABLETS BY MOUTH TWO TIMES A DAY  90 tablet 5    lamoTRIgine (LAMICTAL) 200 MG tablet TAKE 1 TABLET BY MOUTH TWO TIMES A DAY  60 tablet 5    SUMAtriptan (IMITREX) 50 MG tablet TAKE 1 TABLET BY MOUTH ONE TIME A DAY AS NEEDED FOR MIGRAINE 9 tablet 3    atorvastatin (LIPITOR) 40 MG tablet TAKE 1 TABLET BY MOUTH ONE TIME A DAY  90 tablet 1    omeprazole (PRILOSEC) 40 MG delayed release capsule TAKE 1 CAPSULE BY MOUTH TWO TIMES A DAY 60 capsule 4    metoclopramide (REGLAN) 10 MG tablet Take 1 tablet by mouth 3 times daily (with meals) 120 tablet 3    metFORMIN (GLUCOPHAGE) 500 MG tablet TAKE 1 TABLET BY MOUTH THREE TIMES A DAY WITH MEALS 90 tablet 4    Blood Glucose Monitoring Suppl (ACCU-CHEK SOURAV PLUS) w/Device KIT USE TO TEST BLOOD GLUCOSE  1 kit 0    SYMBICORT 160-4.5 MCG/ACT AERO INHALE 2 PUFFS BY MOUTH TWO TIMES A DAY  10.2 g 4    glucose blood VI test strips (ACCU-CHEK SOURAV PLUS) strip 1 each by In Vitro route daily As needed. 100 each 3    ipratropium-albuterol (DUONEB) 0.5-2.5 (3) MG/3ML SOLN nebulizer solution Inhale 3 mLs into the lungs every 4 hours. 360 mL 3    calcium carbonate (OSCAL) 500 MG TABS tablet Take 500 mg by mouth daily.  mometasone (NASONEX) 50 MCG/ACT nasal spray 2 sprays by Nasal route daily.  1 Inhaler 3    Cetirizine HCl (ZYRTEC ALLERGY) 10 MG CAPS Take 1 tablet by mouth. 30 capsule     CHELATED POTASSIUM 99 MG TABS Take 1 tablet by mouth daily.  Cholecalciferol (VITAMIN D) 1000 UNIT CAPS Take 3 capsules by mouth daily. No current facility-administered medications for this visit. Allergies   Allergen Reactions    Dilaudid [Hydromorphone Hcl] Anaphylaxis     Migraines    Cipro Xr     Ciprofloxacin Hives     Nausea,diarrhea    Eggs [Egg White] Other (See Comments)     Diarrhea at times    Tape Aly Estrin Tape]      Blistering  Can use paper tape     Review of Systems   Constitutional: Negative. HENT: Negative. Eyes: Negative. Respiratory: Negative. Cardiovascular: Negative. Gastrointestinal: Negative. Genitourinary: Negative. Musculoskeletal: Negative. Skin: Negative. Neurological: Negative. Endo/Heme/Allergies: Negative. Psychiatric/Behavioral: Negative. Physical Exam   Constitutional: She is oriented to person, place, and time. She appears well-developed and well-nourished. HENT:   Head: Normocephalic and atraumatic. Right Ear: External ear normal.   Left Ear: External ear normal.   Nose: Nose normal.   Mouth/Throat: Oropharynx is clear and moist.   Eyes: Pupils are equal, round, and reactive to light. Conjunctivae and EOM are normal. Right eye exhibits no discharge. Left eye exhibits no discharge. Neck: Normal range of motion. Neck supple. No JVD present. No tracheal deviation present. No thyromegaly present. Cardiovascular: Normal rate, regular rhythm, normal heart sounds and intact distal pulses. Exam reveals no friction rub. No murmur heard. Pulmonary/Chest: Effort normal and breath sounds normal. No respiratory distress. She has no wheezes. She has no rales. She exhibits no tenderness. Abdominal: Soft. Bowel sounds are normal. She exhibits no distension and no mass. There is no tenderness. There is no rebound and no guarding. No hernia.    Musculoskeletal: Normal range of

## 2018-09-25 LAB
A/G RATIO: 1.3 (ref 1.1–2.2)
ALBUMIN SERPL-MCNC: 4.1 G/DL (ref 3.4–5)
ALP BLD-CCNC: 111 U/L (ref 40–129)
ALT SERPL-CCNC: 35 U/L (ref 10–40)
ANION GAP SERPL CALCULATED.3IONS-SCNC: 19 MMOL/L (ref 3–16)
AST SERPL-CCNC: 24 U/L (ref 15–37)
BILIRUB SERPL-MCNC: <0.2 MG/DL (ref 0–1)
BUN BLDV-MCNC: 14 MG/DL (ref 7–20)
CALCIUM SERPL-MCNC: 9.5 MG/DL (ref 8.3–10.6)
CHLORIDE BLD-SCNC: 102 MMOL/L (ref 99–110)
CO2: 24 MMOL/L (ref 21–32)
CREAT SERPL-MCNC: 0.7 MG/DL (ref 0.6–1.1)
ESTIMATED AVERAGE GLUCOSE: 131.2 MG/DL
GFR AFRICAN AMERICAN: >60
GFR NON-AFRICAN AMERICAN: >60
GLOBULIN: 3.1 G/DL
GLUCOSE BLD-MCNC: 119 MG/DL (ref 70–99)
HBA1C MFR BLD: 6.2 %
HCT VFR BLD CALC: 41.4 % (ref 36–48)
HEMOGLOBIN: 13.5 G/DL (ref 12–16)
MCH RBC QN AUTO: 27.7 PG (ref 26–34)
MCHC RBC AUTO-ENTMCNC: 32.7 G/DL (ref 31–36)
MCV RBC AUTO: 84.7 FL (ref 80–100)
PDW BLD-RTO: 15.6 % (ref 12.4–15.4)
PLATELET # BLD: 395 K/UL (ref 135–450)
PMV BLD AUTO: 7.7 FL (ref 5–10.5)
POTASSIUM SERPL-SCNC: 4.9 MMOL/L (ref 3.5–5.1)
RBC # BLD: 4.89 M/UL (ref 4–5.2)
SODIUM BLD-SCNC: 145 MMOL/L (ref 136–145)
TOTAL PROTEIN: 7.2 G/DL (ref 6.4–8.2)
WBC # BLD: 12.2 K/UL (ref 4–11)

## 2018-09-27 NOTE — PROGRESS NOTES
[facility] / [facility] ([facility]) [address] [facility], [address]    Acknowledgment of Informed Consent for Surgical or Medical Procedure and Sedation  I agree to allow doctor(s) Dimas Yepez and his/her associates or assistants, including residents and/or other qualified medical practitioner to perform the following medical treatment or procedure and to administer or direct the administration of sedation as necessary:  Procedure(s): ROBOTIC [address] [facility]  My doctor has explained the following regarding the proposed procedure:   the explanation of the procedure   the benefits of the procedure   the potential problems that might occur during recuperation   the risks and side effects of the procedure which could include but are not limited to severe blood loss, infection, stroke or death   the benefits, risks and side effect of alternative procedures including the consequences of declining this procedure or any alternative procedures   the likelihood of achieving satisfactory results. I acknowledge no guarantee or assurance has been made to me regarding the results. I understand that during the course of this treatment/procedure, unforeseen conditions can occur which require an additional or different procedure. I agree to allow my physician or assistants to perform such extension of the original procedure as they may find necessary. I understand that sedation will often result in temporary impairment of memory and fine motor skills and that sedation can occasionally progress to a state of deep sedation or general anesthesia. I understand the risks of anesthesia for surgery include, but are not limited to, sore throat, hoarseness, injury to face, mouth, or teeth; nausea; headache; injury to blood vessels or nerves; death, brain damage, or paralysis.     I understand that if I have a Limitation of Treatment order in effect during my hospitalization, the order

## 2018-10-03 ENCOUNTER — TELEPHONE (OUTPATIENT)
Dept: SURGERY | Age: 38
End: 2018-10-03

## 2018-10-08 ENCOUNTER — ANESTHESIA EVENT (OUTPATIENT)
Dept: OPERATING ROOM | Age: 38
DRG: 621 | End: 2018-10-08
Payer: COMMERCIAL

## 2018-10-09 ENCOUNTER — ANESTHESIA (OUTPATIENT)
Dept: OPERATING ROOM | Age: 38
DRG: 621 | End: 2018-10-09
Payer: COMMERCIAL

## 2018-10-09 ENCOUNTER — HOSPITAL ENCOUNTER (INPATIENT)
Age: 38
LOS: 2 days | Discharge: HOME | DRG: 621 | End: 2018-10-11
Attending: SURGERY | Admitting: SURGERY
Payer: COMMERCIAL

## 2018-10-09 VITALS — SYSTOLIC BLOOD PRESSURE: 129 MMHG | DIASTOLIC BLOOD PRESSURE: 73 MMHG | TEMPERATURE: 95.7 F | OXYGEN SATURATION: 99 %

## 2018-10-09 DIAGNOSIS — L76.82 PAIN AT SURGICAL INCISION: ICD-10-CM

## 2018-10-09 DIAGNOSIS — E66.01 MORBID OBESITY WITH BMI OF 50.0-59.9, ADULT (HCC): Primary | ICD-10-CM

## 2018-10-09 PROBLEM — K44.9 HIATAL HERNIA WITH GERD: Status: ACTIVE | Noted: 2018-10-09

## 2018-10-09 PROBLEM — K21.9 HIATAL HERNIA WITH GERD: Status: ACTIVE | Noted: 2018-10-09

## 2018-10-09 LAB
ABO/RH: NORMAL
ANTIBODY SCREEN: NORMAL
GLUCOSE BLD-MCNC: 183 MG/DL (ref 70–99)
GLUCOSE BLD-MCNC: 99 MG/DL (ref 70–99)
PERFORMED ON: ABNORMAL
PERFORMED ON: NORMAL

## 2018-10-09 PROCEDURE — 6360000002 HC RX W HCPCS: Performed by: SURGERY

## 2018-10-09 PROCEDURE — 94640 AIRWAY INHALATION TREATMENT: CPT

## 2018-10-09 PROCEDURE — 86850 RBC ANTIBODY SCREEN: CPT

## 2018-10-09 PROCEDURE — 94150 VITAL CAPACITY TEST: CPT

## 2018-10-09 PROCEDURE — 3700000000 HC ANESTHESIA ATTENDED CARE: Performed by: SURGERY

## 2018-10-09 PROCEDURE — 7100000001 HC PACU RECOVERY - ADDTL 15 MIN: Performed by: SURGERY

## 2018-10-09 PROCEDURE — 86901 BLOOD TYPING SEROLOGIC RH(D): CPT

## 2018-10-09 PROCEDURE — C9113 INJ PANTOPRAZOLE SODIUM, VIA: HCPCS | Performed by: SURGERY

## 2018-10-09 PROCEDURE — 2780000010 HC IMPLANT OTHER: Performed by: SURGERY

## 2018-10-09 PROCEDURE — 2500000003 HC RX 250 WO HCPCS: Performed by: NURSE ANESTHETIST, CERTIFIED REGISTERED

## 2018-10-09 PROCEDURE — S2900 ROBOTIC SURGICAL SYSTEM: HCPCS | Performed by: SURGERY

## 2018-10-09 PROCEDURE — 94761 N-INVAS EAR/PLS OXIMETRY MLT: CPT

## 2018-10-09 PROCEDURE — 2580000003 HC RX 258: Performed by: SURGERY

## 2018-10-09 PROCEDURE — 6370000000 HC RX 637 (ALT 250 FOR IP): Performed by: SURGERY

## 2018-10-09 PROCEDURE — 43775 LAP SLEEVE GASTRECTOMY: CPT | Performed by: SURGERY

## 2018-10-09 PROCEDURE — 0DB64Z3 EXCISION OF STOMACH, PERCUTANEOUS ENDOSCOPIC APPROACH, VERTICAL: ICD-10-PCS | Performed by: SURGERY

## 2018-10-09 PROCEDURE — 8E0W4CZ ROBOTIC ASSISTED PROCEDURE OF TRUNK REGION, PERCUTANEOUS ENDOSCOPIC APPROACH: ICD-10-PCS | Performed by: SURGERY

## 2018-10-09 PROCEDURE — 2500000003 HC RX 250 WO HCPCS: Performed by: SURGERY

## 2018-10-09 PROCEDURE — 6360000002 HC RX W HCPCS: Performed by: NURSE ANESTHETIST, CERTIFIED REGISTERED

## 2018-10-09 PROCEDURE — 6360000002 HC RX W HCPCS: Performed by: ANESTHESIOLOGY

## 2018-10-09 PROCEDURE — 86900 BLOOD TYPING SEROLOGIC ABO: CPT

## 2018-10-09 PROCEDURE — 88307 TISSUE EXAM BY PATHOLOGIST: CPT

## 2018-10-09 PROCEDURE — 43281 LAP PARAESOPHAG HERN REPAIR: CPT | Performed by: SURGERY

## 2018-10-09 PROCEDURE — 2500000003 HC RX 250 WO HCPCS: Performed by: ANESTHESIOLOGY

## 2018-10-09 PROCEDURE — 3600000009 HC SURGERY ROBOT BASE: Performed by: SURGERY

## 2018-10-09 PROCEDURE — 2580000003 HC RX 258: Performed by: NURSE ANESTHETIST, CERTIFIED REGISTERED

## 2018-10-09 PROCEDURE — 2580000003 HC RX 258: Performed by: ANESTHESIOLOGY

## 2018-10-09 PROCEDURE — 2700000000 HC OXYGEN THERAPY PER DAY

## 2018-10-09 PROCEDURE — S0028 INJECTION, FAMOTIDINE, 20 MG: HCPCS | Performed by: ANESTHESIOLOGY

## 2018-10-09 PROCEDURE — 3600000019 HC SURGERY ROBOT ADDTL 15MIN: Performed by: SURGERY

## 2018-10-09 PROCEDURE — 94770 HC ETCO2 MONITOR DAILY: CPT

## 2018-10-09 PROCEDURE — 1200000000 HC SEMI PRIVATE

## 2018-10-09 PROCEDURE — L0625 LO FLEX L1-BELOW L5 PRE OTS: HCPCS | Performed by: SURGERY

## 2018-10-09 PROCEDURE — 0BQT4ZZ REPAIR DIAPHRAGM, PERCUTANEOUS ENDOSCOPIC APPROACH: ICD-10-PCS | Performed by: SURGERY

## 2018-10-09 PROCEDURE — 2720000010 HC SURG SUPPLY STERILE: Performed by: SURGERY

## 2018-10-09 PROCEDURE — 3700000001 HC ADD 15 MINUTES (ANESTHESIA): Performed by: SURGERY

## 2018-10-09 PROCEDURE — 2709999900 HC NON-CHARGEABLE SUPPLY: Performed by: SURGERY

## 2018-10-09 PROCEDURE — 7100000000 HC PACU RECOVERY - FIRST 15 MIN: Performed by: SURGERY

## 2018-10-09 PROCEDURE — 94760 N-INVAS EAR/PLS OXIMETRY 1: CPT

## 2018-10-09 RX ORDER — PROPOFOL 10 MG/ML
INJECTION, EMULSION INTRAVENOUS PRN
Status: DISCONTINUED | OUTPATIENT
Start: 2018-10-09 | End: 2018-10-09 | Stop reason: SDUPTHER

## 2018-10-09 RX ORDER — SODIUM CHLORIDE 0.9 % (FLUSH) 0.9 %
10 SYRINGE (ML) INJECTION PRN
Status: DISCONTINUED | OUTPATIENT
Start: 2018-10-09 | End: 2018-10-09 | Stop reason: HOSPADM

## 2018-10-09 RX ORDER — 0.9 % SODIUM CHLORIDE 0.9 %
10 VIAL (ML) INJECTION DAILY
Status: DISCONTINUED | OUTPATIENT
Start: 2018-10-09 | End: 2018-10-11 | Stop reason: HOSPADM

## 2018-10-09 RX ORDER — NALOXONE HYDROCHLORIDE 0.4 MG/ML
0.4 INJECTION, SOLUTION INTRAMUSCULAR; INTRAVENOUS; SUBCUTANEOUS PRN
Status: DISCONTINUED | OUTPATIENT
Start: 2018-10-09 | End: 2018-10-10

## 2018-10-09 RX ORDER — SCOLOPAMINE TRANSDERMAL SYSTEM 1 MG/1
1 PATCH, EXTENDED RELEASE TRANSDERMAL ONCE
Status: DISCONTINUED | OUTPATIENT
Start: 2018-10-09 | End: 2018-10-11 | Stop reason: HOSPADM

## 2018-10-09 RX ORDER — ONDANSETRON 2 MG/ML
4 INJECTION INTRAMUSCULAR; INTRAVENOUS
Status: DISCONTINUED | OUTPATIENT
Start: 2018-10-09 | End: 2018-10-09 | Stop reason: HOSPADM

## 2018-10-09 RX ORDER — MEPERIDINE HYDROCHLORIDE 25 MG/ML
12.5 INJECTION INTRAMUSCULAR; INTRAVENOUS; SUBCUTANEOUS EVERY 5 MIN PRN
Status: DISCONTINUED | OUTPATIENT
Start: 2018-10-09 | End: 2018-10-09 | Stop reason: HOSPADM

## 2018-10-09 RX ORDER — LIDOCAINE HYDROCHLORIDE 20 MG/ML
INJECTION, SOLUTION INFILTRATION; PERINEURAL PRN
Status: DISCONTINUED | OUTPATIENT
Start: 2018-10-09 | End: 2018-10-09 | Stop reason: SDUPTHER

## 2018-10-09 RX ORDER — ALBUTEROL SULFATE 2.5 MG/3ML
2.5 SOLUTION RESPIRATORY (INHALATION) EVERY 6 HOURS PRN
Status: DISCONTINUED | OUTPATIENT
Start: 2018-10-09 | End: 2018-10-10

## 2018-10-09 RX ORDER — SODIUM CHLORIDE 9 MG/ML
INJECTION, SOLUTION INTRAVENOUS CONTINUOUS
Status: DISCONTINUED | OUTPATIENT
Start: 2018-10-09 | End: 2018-10-10

## 2018-10-09 RX ORDER — NEOSTIGMINE METHYLSULFATE 5 MG/5 ML
SYRINGE (ML) INTRAVENOUS PRN
Status: DISCONTINUED | OUTPATIENT
Start: 2018-10-09 | End: 2018-10-09 | Stop reason: SDUPTHER

## 2018-10-09 RX ORDER — SODIUM CHLORIDE, SODIUM LACTATE, POTASSIUM CHLORIDE, CALCIUM CHLORIDE 600; 310; 30; 20 MG/100ML; MG/100ML; MG/100ML; MG/100ML
INJECTION, SOLUTION INTRAVENOUS CONTINUOUS
Status: DISCONTINUED | OUTPATIENT
Start: 2018-10-09 | End: 2018-10-10

## 2018-10-09 RX ORDER — GLYCOPYRROLATE 1 MG/5 ML
SYRINGE (ML) INTRAVENOUS PRN
Status: DISCONTINUED | OUTPATIENT
Start: 2018-10-09 | End: 2018-10-09 | Stop reason: SDUPTHER

## 2018-10-09 RX ORDER — DEXAMETHASONE SODIUM PHOSPHATE 4 MG/ML
10 INJECTION, SOLUTION INTRA-ARTICULAR; INTRALESIONAL; INTRAMUSCULAR; INTRAVENOUS; SOFT TISSUE ONCE
Status: DISCONTINUED | OUTPATIENT
Start: 2018-10-09 | End: 2018-10-09 | Stop reason: HOSPADM

## 2018-10-09 RX ORDER — MORPHINE SULFATE/PF 50 MG/50ML
PATIENT CONTROLLED ANALGESIA SYRINGE INTRAVENOUS CONTINUOUS
Status: DISCONTINUED | OUTPATIENT
Start: 2018-10-09 | End: 2018-10-10

## 2018-10-09 RX ORDER — PANTOPRAZOLE SODIUM 40 MG/10ML
40 INJECTION, POWDER, LYOPHILIZED, FOR SOLUTION INTRAVENOUS DAILY
Status: DISCONTINUED | OUTPATIENT
Start: 2018-10-09 | End: 2018-10-11 | Stop reason: HOSPADM

## 2018-10-09 RX ORDER — GLYCOPYRROLATE 0.2 MG/ML
0.1 INJECTION INTRAMUSCULAR; INTRAVENOUS ONCE
Status: COMPLETED | OUTPATIENT
Start: 2018-10-09 | End: 2018-10-09

## 2018-10-09 RX ORDER — SODIUM CHLORIDE 0.9 % (FLUSH) 0.9 %
10 SYRINGE (ML) INJECTION EVERY 12 HOURS SCHEDULED
Status: DISCONTINUED | OUTPATIENT
Start: 2018-10-09 | End: 2018-10-09 | Stop reason: HOSPADM

## 2018-10-09 RX ORDER — MORPHINE SULFATE 4 MG/ML
INJECTION, SOLUTION INTRAMUSCULAR; INTRAVENOUS
Status: DISPENSED
Start: 2018-10-09 | End: 2018-10-10

## 2018-10-09 RX ORDER — METOPROLOL TARTRATE 5 MG/5ML
INJECTION INTRAVENOUS PRN
Status: DISCONTINUED | OUTPATIENT
Start: 2018-10-09 | End: 2018-10-09 | Stop reason: SDUPTHER

## 2018-10-09 RX ORDER — SUCCINYLCHOLINE CHLORIDE 20 MG/ML
INJECTION INTRAMUSCULAR; INTRAVENOUS PRN
Status: DISCONTINUED | OUTPATIENT
Start: 2018-10-09 | End: 2018-10-09 | Stop reason: SDUPTHER

## 2018-10-09 RX ORDER — ONDANSETRON 2 MG/ML
4 INJECTION INTRAMUSCULAR; INTRAVENOUS ONCE
Status: COMPLETED | OUTPATIENT
Start: 2018-10-09 | End: 2018-10-09

## 2018-10-09 RX ORDER — LIDOCAINE HYDROCHLORIDE 10 MG/ML
1 INJECTION, SOLUTION EPIDURAL; INFILTRATION; INTRACAUDAL; PERINEURAL
Status: DISCONTINUED | OUTPATIENT
Start: 2018-10-09 | End: 2018-10-09 | Stop reason: HOSPADM

## 2018-10-09 RX ORDER — MIDAZOLAM HYDROCHLORIDE 1 MG/ML
INJECTION INTRAMUSCULAR; INTRAVENOUS PRN
Status: DISCONTINUED | OUTPATIENT
Start: 2018-10-09 | End: 2018-10-09 | Stop reason: SDUPTHER

## 2018-10-09 RX ORDER — SODIUM CHLORIDE, SODIUM LACTATE, POTASSIUM CHLORIDE, CALCIUM CHLORIDE 600; 310; 30; 20 MG/100ML; MG/100ML; MG/100ML; MG/100ML
INJECTION, SOLUTION INTRAVENOUS CONTINUOUS
Status: DISCONTINUED | OUTPATIENT
Start: 2018-10-09 | End: 2018-10-11

## 2018-10-09 RX ORDER — APREPITANT 40 MG/1
80 CAPSULE ORAL ONCE
Status: COMPLETED | OUTPATIENT
Start: 2018-10-09 | End: 2018-10-09

## 2018-10-09 RX ORDER — SCOLOPAMINE TRANSDERMAL SYSTEM 1 MG/1
1 PATCH, EXTENDED RELEASE TRANSDERMAL
Status: DISCONTINUED | OUTPATIENT
Start: 2018-10-09 | End: 2018-10-11 | Stop reason: HOSPADM

## 2018-10-09 RX ORDER — LABETALOL HYDROCHLORIDE 5 MG/ML
5 INJECTION, SOLUTION INTRAVENOUS EVERY 10 MIN PRN
Status: DISCONTINUED | OUTPATIENT
Start: 2018-10-09 | End: 2018-10-09 | Stop reason: HOSPADM

## 2018-10-09 RX ORDER — FENTANYL CITRATE 50 UG/ML
50 INJECTION, SOLUTION INTRAMUSCULAR; INTRAVENOUS EVERY 5 MIN PRN
Status: DISCONTINUED | OUTPATIENT
Start: 2018-10-09 | End: 2018-10-09 | Stop reason: HOSPADM

## 2018-10-09 RX ORDER — ROCURONIUM BROMIDE 10 MG/ML
INJECTION, SOLUTION INTRAVENOUS PRN
Status: DISCONTINUED | OUTPATIENT
Start: 2018-10-09 | End: 2018-10-09 | Stop reason: SDUPTHER

## 2018-10-09 RX ORDER — METOCLOPRAMIDE HYDROCHLORIDE 5 MG/ML
10 INJECTION INTRAMUSCULAR; INTRAVENOUS EVERY 6 HOURS PRN
Status: DISCONTINUED | OUTPATIENT
Start: 2018-10-09 | End: 2018-10-11 | Stop reason: HOSPADM

## 2018-10-09 RX ORDER — LIDOCAINE HYDROCHLORIDE 10 MG/ML
INJECTION, SOLUTION EPIDURAL; INFILTRATION; INTRACAUDAL; PERINEURAL
Status: DISPENSED
Start: 2018-10-09 | End: 2018-10-09

## 2018-10-09 RX ORDER — SODIUM CHLORIDE, SODIUM LACTATE, POTASSIUM CHLORIDE, CALCIUM CHLORIDE 600; 310; 30; 20 MG/100ML; MG/100ML; MG/100ML; MG/100ML
INJECTION, SOLUTION INTRAVENOUS CONTINUOUS PRN
Status: DISCONTINUED | OUTPATIENT
Start: 2018-10-09 | End: 2018-10-09 | Stop reason: SDUPTHER

## 2018-10-09 RX ORDER — MORPHINE SULFATE 2 MG/ML
2 INJECTION, SOLUTION INTRAMUSCULAR; INTRAVENOUS EVERY 5 MIN PRN
Status: DISCONTINUED | OUTPATIENT
Start: 2018-10-09 | End: 2018-10-09 | Stop reason: HOSPADM

## 2018-10-09 RX ORDER — BUPIVACAINE HYDROCHLORIDE 5 MG/ML
INJECTION, SOLUTION EPIDURAL; INTRACAUDAL PRN
Status: DISCONTINUED | OUTPATIENT
Start: 2018-10-09 | End: 2018-10-09 | Stop reason: HOSPADM

## 2018-10-09 RX ORDER — FENTANYL CITRATE 50 UG/ML
INJECTION, SOLUTION INTRAMUSCULAR; INTRAVENOUS PRN
Status: DISCONTINUED | OUTPATIENT
Start: 2018-10-09 | End: 2018-10-09 | Stop reason: SDUPTHER

## 2018-10-09 RX ORDER — METHYLENE BLUE 10 MG/ML
INJECTION INTRAVENOUS PRN
Status: DISCONTINUED | OUTPATIENT
Start: 2018-10-09 | End: 2018-10-09 | Stop reason: HOSPADM

## 2018-10-09 RX ORDER — METOCLOPRAMIDE HYDROCHLORIDE 5 MG/ML
10 INJECTION INTRAMUSCULAR; INTRAVENOUS ONCE
Status: COMPLETED | OUTPATIENT
Start: 2018-10-09 | End: 2018-10-09

## 2018-10-09 RX ORDER — LABETALOL HYDROCHLORIDE 5 MG/ML
5 INJECTION, SOLUTION INTRAVENOUS EVERY 6 HOURS PRN
Status: DISCONTINUED | OUTPATIENT
Start: 2018-10-09 | End: 2018-10-11 | Stop reason: HOSPADM

## 2018-10-09 RX ORDER — ENALAPRILAT 2.5 MG/2ML
1.25 INJECTION INTRAVENOUS EVERY 6 HOURS PRN
Status: DISCONTINUED | OUTPATIENT
Start: 2018-10-09 | End: 2018-10-11 | Stop reason: HOSPADM

## 2018-10-09 RX ORDER — ONDANSETRON 2 MG/ML
4 INJECTION INTRAMUSCULAR; INTRAVENOUS EVERY 6 HOURS PRN
Status: DISCONTINUED | OUTPATIENT
Start: 2018-10-09 | End: 2018-10-11 | Stop reason: HOSPADM

## 2018-10-09 RX ORDER — ALBUTEROL SULFATE 90 UG/1
2 AEROSOL, METERED RESPIRATORY (INHALATION) EVERY 6 HOURS PRN
Status: DISCONTINUED | OUTPATIENT
Start: 2018-10-09 | End: 2018-10-09

## 2018-10-09 RX ADMIN — PROPOFOL 50 MG: 10 INJECTION, EMULSION INTRAVENOUS at 11:00

## 2018-10-09 RX ADMIN — ONDANSETRON 4 MG: 2 INJECTION INTRAMUSCULAR; INTRAVENOUS at 09:23

## 2018-10-09 RX ADMIN — FENTANYL CITRATE 100 MCG: 50 INJECTION INTRAMUSCULAR; INTRAVENOUS at 10:19

## 2018-10-09 RX ADMIN — PANTOPRAZOLE SODIUM 40 MG: 40 INJECTION, POWDER, FOR SOLUTION INTRAVENOUS at 15:39

## 2018-10-09 RX ADMIN — MORPHINE SULFATE 2 MG: 2 INJECTION, SOLUTION INTRAMUSCULAR; INTRAVENOUS at 13:45

## 2018-10-09 RX ADMIN — MORPHINE SULFATE 2 MG: 2 INJECTION, SOLUTION INTRAMUSCULAR; INTRAVENOUS at 13:38

## 2018-10-09 RX ADMIN — ROCURONIUM BROMIDE 5 MG: 10 INJECTION, SOLUTION INTRAVENOUS at 10:17

## 2018-10-09 RX ADMIN — ONDANSETRON HYDROCHLORIDE 4 MG: 2 SOLUTION INTRAMUSCULAR; INTRAVENOUS at 15:40

## 2018-10-09 RX ADMIN — GLYCOPYRROLATE 0.1 MG: 0.2 INJECTION, SOLUTION INTRAMUSCULAR; INTRAVENOUS at 09:23

## 2018-10-09 RX ADMIN — LIDOCAINE HYDROCHLORIDE 50 MG: 20 INJECTION, SOLUTION INFILTRATION; PERINEURAL at 10:17

## 2018-10-09 RX ADMIN — FENTANYL CITRATE 50 MCG: 50 INJECTION INTRAMUSCULAR; INTRAVENOUS at 11:10

## 2018-10-09 RX ADMIN — FENTANYL CITRATE 100 MCG: 50 INJECTION INTRAMUSCULAR; INTRAVENOUS at 10:17

## 2018-10-09 RX ADMIN — SODIUM CHLORIDE, SODIUM LACTATE, POTASSIUM CHLORIDE, AND CALCIUM CHLORIDE: 600; 310; 30; 20 INJECTION, SOLUTION INTRAVENOUS at 12:20

## 2018-10-09 RX ADMIN — ALBUTEROL SULFATE 2.5 MG: 2.5 SOLUTION RESPIRATORY (INHALATION) at 21:38

## 2018-10-09 RX ADMIN — MORPHINE SULFATE 2 MG: 2 INJECTION, SOLUTION INTRAMUSCULAR; INTRAVENOUS at 13:53

## 2018-10-09 RX ADMIN — SODIUM CHLORIDE, SODIUM LACTATE, POTASSIUM CHLORIDE, AND CALCIUM CHLORIDE: 600; 310; 30; 20 INJECTION, SOLUTION INTRAVENOUS at 22:24

## 2018-10-09 RX ADMIN — Medication 4 MG: at 12:55

## 2018-10-09 RX ADMIN — SUCCINYLCHOLINE CHLORIDE 100 MG: 20 INJECTION, SOLUTION INTRAMUSCULAR; INTRAVENOUS; PARENTERAL at 10:17

## 2018-10-09 RX ADMIN — SODIUM CHLORIDE: 9 INJECTION, SOLUTION INTRAVENOUS at 09:21

## 2018-10-09 RX ADMIN — MIDAZOLAM HYDROCHLORIDE 2 MG: 1 INJECTION INTRAMUSCULAR; INTRAVENOUS at 10:09

## 2018-10-09 RX ADMIN — MORPHINE SULFATE 30 MG: 1 INJECTION INTRAVENOUS at 13:58

## 2018-10-09 RX ADMIN — METOPROLOL TARTRATE 3 MG: 5 INJECTION, SOLUTION INTRAVENOUS at 11:39

## 2018-10-09 RX ADMIN — Medication 0.8 MG: at 12:55

## 2018-10-09 RX ADMIN — FAMOTIDINE 20 MG: 10 INJECTION, SOLUTION INTRAVENOUS at 09:23

## 2018-10-09 RX ADMIN — SODIUM CHLORIDE, SODIUM LACTATE, POTASSIUM CHLORIDE, AND CALCIUM CHLORIDE: 600; 310; 30; 20 INJECTION, SOLUTION INTRAVENOUS at 09:19

## 2018-10-09 RX ADMIN — APREPITANT 80 MG: 40 CAPSULE ORAL at 09:22

## 2018-10-09 RX ADMIN — ENALAPRILAT 1.25 MG: 2.5 INJECTION INTRAVENOUS at 20:44

## 2018-10-09 RX ADMIN — ENOXAPARIN SODIUM 40 MG: 40 INJECTION SUBCUTANEOUS at 09:22

## 2018-10-09 RX ADMIN — SODIUM CHLORIDE, POTASSIUM CHLORIDE, SODIUM LACTATE AND CALCIUM CHLORIDE: 600; 310; 30; 20 INJECTION, SOLUTION INTRAVENOUS at 18:36

## 2018-10-09 RX ADMIN — CEFAZOLIN SODIUM 3 G: 1 POWDER, FOR SOLUTION INTRAMUSCULAR; INTRAVENOUS at 10:27

## 2018-10-09 RX ADMIN — Medication 10 ML: at 15:39

## 2018-10-09 RX ADMIN — SODIUM CHLORIDE, SODIUM LACTATE, POTASSIUM CHLORIDE, AND CALCIUM CHLORIDE: 600; 310; 30; 20 INJECTION, SOLUTION INTRAVENOUS at 12:00

## 2018-10-09 RX ADMIN — FENTANYL CITRATE 50 MCG: 50 INJECTION INTRAMUSCULAR; INTRAVENOUS at 11:15

## 2018-10-09 RX ADMIN — METOPROLOL TARTRATE 2 MG: 5 INJECTION, SOLUTION INTRAVENOUS at 11:41

## 2018-10-09 RX ADMIN — PROPOFOL 200 MG: 10 INJECTION, EMULSION INTRAVENOUS at 10:17

## 2018-10-09 RX ADMIN — MORPHINE SULFATE 2 MG: 2 INJECTION, SOLUTION INTRAMUSCULAR; INTRAVENOUS at 14:02

## 2018-10-09 RX ADMIN — LIDOCAINE HYDROCHLORIDE 50 MG: 20 INJECTION, SOLUTION INFILTRATION; PERINEURAL at 11:25

## 2018-10-09 RX ADMIN — ROCURONIUM BROMIDE 45 MG: 10 INJECTION, SOLUTION INTRAVENOUS at 10:25

## 2018-10-09 RX ADMIN — METOCLOPRAMIDE 10 MG: 5 INJECTION, SOLUTION INTRAMUSCULAR; INTRAVENOUS at 09:23

## 2018-10-09 RX ADMIN — MORPHINE SULFATE 2 MG: 2 INJECTION, SOLUTION INTRAMUSCULAR; INTRAVENOUS at 13:32

## 2018-10-09 RX ADMIN — SODIUM CHLORIDE, POTASSIUM CHLORIDE, SODIUM LACTATE AND CALCIUM CHLORIDE: 600; 310; 30; 20 INJECTION, SOLUTION INTRAVENOUS at 14:30

## 2018-10-09 RX ADMIN — ROCURONIUM BROMIDE 20 MG: 10 INJECTION, SOLUTION INTRAVENOUS at 11:00

## 2018-10-09 ASSESSMENT — PULMONARY FUNCTION TESTS
PIF_VALUE: 31
PIF_VALUE: 31
PIF_VALUE: 26
PIF_VALUE: 31
PIF_VALUE: 32
PIF_VALUE: 0
PIF_VALUE: 31
PIF_VALUE: 32
PIF_VALUE: 31
PIF_VALUE: 25
PIF_VALUE: 5
PIF_VALUE: 31
PIF_VALUE: 33
PIF_VALUE: 32
PIF_VALUE: 26
PIF_VALUE: 31
PIF_VALUE: 33
PIF_VALUE: 30
PIF_VALUE: 27
PIF_VALUE: 30
PIF_VALUE: 33
PIF_VALUE: 30
PIF_VALUE: 2
PIF_VALUE: 31
PIF_VALUE: 0
PIF_VALUE: 31
PIF_VALUE: 24
PIF_VALUE: 1
PIF_VALUE: 31
PIF_VALUE: 33
PIF_VALUE: 31
PIF_VALUE: 0
PIF_VALUE: 31
PIF_VALUE: 28
PIF_VALUE: 25
PIF_VALUE: 30
PIF_VALUE: 31
PIF_VALUE: 32
PIF_VALUE: 28
PIF_VALUE: 32
PIF_VALUE: 33
PIF_VALUE: 31
PIF_VALUE: 33
PIF_VALUE: 30
PIF_VALUE: 25
PIF_VALUE: 32
PIF_VALUE: 31
PIF_VALUE: 31
PIF_VALUE: 25
PIF_VALUE: 32
PIF_VALUE: 25
PIF_VALUE: 31
PIF_VALUE: 33
PIF_VALUE: 24
PIF_VALUE: 0
PIF_VALUE: 0
PIF_VALUE: 35
PIF_VALUE: 31
PIF_VALUE: 32
PIF_VALUE: 31
PIF_VALUE: 24
PIF_VALUE: 25
PIF_VALUE: 32
PIF_VALUE: 26
PIF_VALUE: 25
PIF_VALUE: 31
PIF_VALUE: 25
PIF_VALUE: 31
PIF_VALUE: 32
PIF_VALUE: 25
PIF_VALUE: 31
PIF_VALUE: 35
PIF_VALUE: 24
PIF_VALUE: 33
PIF_VALUE: 31
PIF_VALUE: 26
PIF_VALUE: 25
PIF_VALUE: 25
PIF_VALUE: 31
PIF_VALUE: 25
PIF_VALUE: 31
PIF_VALUE: 23
PIF_VALUE: 31
PIF_VALUE: 31
PIF_VALUE: 34
PIF_VALUE: 25
PIF_VALUE: 31
PIF_VALUE: 0
PIF_VALUE: 31
PIF_VALUE: 32
PIF_VALUE: 25
PIF_VALUE: 25
PIF_VALUE: 31
PIF_VALUE: 32
PIF_VALUE: 24
PIF_VALUE: 26
PIF_VALUE: 25
PIF_VALUE: 26
PIF_VALUE: 31
PIF_VALUE: 31
PIF_VALUE: 23
PIF_VALUE: 33
PIF_VALUE: 33
PIF_VALUE: 24
PIF_VALUE: 1
PIF_VALUE: 31
PIF_VALUE: 31
PIF_VALUE: 19
PIF_VALUE: 31
PIF_VALUE: 32
PIF_VALUE: 25
PIF_VALUE: 25
PIF_VALUE: 33
PIF_VALUE: 27
PIF_VALUE: 7
PIF_VALUE: 29
PIF_VALUE: 32
PIF_VALUE: 30
PIF_VALUE: 31
PIF_VALUE: 32
PIF_VALUE: 27
PIF_VALUE: 30
PIF_VALUE: 32
PIF_VALUE: 4
PIF_VALUE: 25
PIF_VALUE: 31
PIF_VALUE: 30
PIF_VALUE: 34
PIF_VALUE: 31
PIF_VALUE: 30
PIF_VALUE: 0
PIF_VALUE: 25
PIF_VALUE: 31
PIF_VALUE: 33
PIF_VALUE: 33
PIF_VALUE: 1
PIF_VALUE: 31
PIF_VALUE: 31
PIF_VALUE: 32
PIF_VALUE: 31
PIF_VALUE: 31
PIF_VALUE: 30
PIF_VALUE: 5
PIF_VALUE: 24
PIF_VALUE: 31
PIF_VALUE: 4
PIF_VALUE: 30
PIF_VALUE: 31
PIF_VALUE: 25
PIF_VALUE: 31
PIF_VALUE: 30
PIF_VALUE: 31
PIF_VALUE: 28
PIF_VALUE: 24
PIF_VALUE: 33
PIF_VALUE: 31
PIF_VALUE: 30
PIF_VALUE: 33
PIF_VALUE: 25
PIF_VALUE: 31
PIF_VALUE: 31
PIF_VALUE: 26
PIF_VALUE: 27
PIF_VALUE: 34

## 2018-10-09 ASSESSMENT — PAIN - FUNCTIONAL ASSESSMENT: PAIN_FUNCTIONAL_ASSESSMENT: 0-10

## 2018-10-09 ASSESSMENT — PAIN SCALES - GENERAL
PAINLEVEL_OUTOF10: 6
PAINLEVEL_OUTOF10: 10
PAINLEVEL_OUTOF10: 8
PAINLEVEL_OUTOF10: 10
PAINLEVEL_OUTOF10: 9
PAINLEVEL_OUTOF10: 8
PAINLEVEL_OUTOF10: 6
PAINLEVEL_OUTOF10: 10
PAINLEVEL_OUTOF10: 8

## 2018-10-09 ASSESSMENT — PAIN DESCRIPTION - PAIN TYPE
TYPE: SURGICAL PAIN
TYPE: SURGICAL PAIN

## 2018-10-09 ASSESSMENT — PAIN DESCRIPTION - DESCRIPTORS
DESCRIPTORS: CRAMPING
DESCRIPTORS: ACHING;SPASM;SHOOTING

## 2018-10-09 ASSESSMENT — PAIN DESCRIPTION - ORIENTATION: ORIENTATION: MID;ANTERIOR

## 2018-10-09 ASSESSMENT — PAIN DESCRIPTION - LOCATION
LOCATION: ABDOMEN
LOCATION: ABDOMEN

## 2018-10-09 ASSESSMENT — PAIN DESCRIPTION - ONSET: ONSET: ON-GOING

## 2018-10-09 ASSESSMENT — PAIN DESCRIPTION - PROGRESSION: CLINICAL_PROGRESSION: GRADUALLY WORSENING

## 2018-10-09 ASSESSMENT — PAIN DESCRIPTION - FREQUENCY: FREQUENCY: CONTINUOUS

## 2018-10-09 NOTE — ANESTHESIA POSTPROCEDURE EVALUATION
Department of Anesthesiology  Postprocedure Note    Patient: Yoana Douglas  MRN: [id number]  YOB: 1980  Date of evaluation: 10/9/2018  Time:  1:34 PM     Procedure Summary     Date:  10/09/18 Room / Location:  [facility] OR    Anesthesia Start:  1011 Anesthesia Stop:  0579    Procedure:  ROBOTIC ASSISTED LAPAROSCOPIC SLEEVE GASTRECTOMY, HIATAL HERNIA REPAIR (N/A Abdomen) Diagnosis:  (MORBID OBESITY)    Surgeon: Diego Felty, DO Responsible Provider:  Verlene Goldberg, MD    Anesthesia Type:  general ASA Status:  3          Anesthesia Type: general    Ana Phase I: Ana Score: 10    Ana Phase II:      Last vitals: Reviewed and per EMR flowsheets.        Anesthesia Post Evaluation    Patient location during evaluation: PACU  Patient participation: complete - patient participated  Level of consciousness: awake and alert  Airway patency: patent  Nausea & Vomiting: no vomiting and no nausea  Complications: no  Cardiovascular status: hemodynamically stable  Respiratory status: acceptable  Hydration status: euvolemic

## 2018-10-09 NOTE — ANESTHESIA PRE PROCEDURE
Body piercing     retained in nose/ears    Chronic constipation 2014    Depression 2011    Diabetes mellitus ([facility] [address]     Fatty liver     GERD (gastroesophageal reflux disease)     Hyperlipidemia     Hypothyroidism 2011    Migraine     Obesity     Other ovarian dysfunction (luteal est/progest deficiency) 2011    PCOS (polycystic ovarian syndrome)     Prolonged emergence from general anesthesia     Vitamin D deficiency 2011       Past Surgical History:        Procedure Laterality Date    BREAST LUMPECTOMY  2000    left (cyst)     SECTION      1    ENDOMETRIAL ABLATION      Dr Jazmine Dinh Right 13    Dr Rony Hernandez; REMOVAL POSTERIOR CALCANEAL HEEL SPUR RIGHT FOOT    HYSTERECTOMY      uterus    OTHER SURGICAL HISTORY  2018    ROBOTIC ASSISTED LAPAROSCOPIC PYLOROPLASTY, INTRA ABDOMINAL FLAP OMENTAL FLAP, INTRAOPERATIVE EGD WITH BIOPSY    PARTIAL HYSTERECTOMY  10/15/13    Dr Lisa Recio benign pathology.  TONSILLECTOMY  1988    TUBAL LIGATION         Social History:    Social History   Substance Use Topics    Smoking status: Never Smoker    Smokeless tobacco: Never Used      Comment: congratulated on nonsmoking status.  Alcohol use Yes      Comment: OCC                                Counseling given: Not Answered      Vital Signs (Current):   Vitals:    10/09/18 0827   BP: 115/63   Pulse: 82   Resp: 18   Temp: 97.4 °F (36.3 °C)   TempSrc: Oral   SpO2: 91%   Weight: (!) 312 lb 1.3 oz (141.6 kg)   Height: 5' 5\" (1.651 m)                                              BP Readings from Last 3 Encounters:   10/09/18 115/63   18 130/74   18 134/86       NPO Status:                                                                                 BMI:   Wt Readings from Last 3 Encounters:   10/09/18 (!) 312 lb 1.3 oz (141.6 kg)   18 (!) 326 lb (147.9 kg)   18 (!) 323 lb 6.4 oz (146.7 kg)     Body mass index is 51.93 kg/m².     CBC: Lab Results   Component Value Date    WBC 12.2 09/24/2018    RBC 4.89 09/24/2018    HGB 13.5 09/24/2018    HCT 41.4 09/24/2018    MCV 84.7 09/24/2018    RDW 15.6 09/24/2018     09/24/2018       CMP:   Lab Results   Component Value Date     09/24/2018    K 4.9 09/24/2018     09/24/2018    CO2 24 09/24/2018    BUN 14 09/24/2018    CREATININE 0.7 09/24/2018    GFRAA >60 09/24/2018    GFRAA >60 08/25/2012    AGRATIO 1.3 09/24/2018    LABGLOM >60 09/24/2018    GLUCOSE 119 09/24/2018    PROT 7.2 09/24/2018    PROT 6.4 08/25/2012    CALCIUM 9.5 09/24/2018    BILITOT <0.2 09/24/2018    ALKPHOS 111 09/24/2018    AST 24 09/24/2018    ALT 35 09/24/2018       POC Tests: No results for input(s): POCGLU, POCNA, POCK, POCCL, POCBUN, POCHEMO, POCHCT in the last 72 hours. Coags: No results found for: PROTIME, INR, APTT    HCG (If Applicable):   Lab Results   Component Value Date    PREGTESTUR Negative 05/16/2013        ABGs: No results found for: PHART, PO2ART, DXR6CRC, RDX3GKU, BEART, E6HVJOQN     Type & Screen (If Applicable):  Lab Results   Component Value Date    LABABO AB 05/14/2010    [address] Positive 05/14/2010       Anesthesia Evaluation     history of anesthetic complications: postop delirium. Airway: Mallampati: III  TM distance: <3 FB   Neck ROM: full  Mouth opening: > = 3 FB Dental: normal exam         Pulmonary:normal exam    (+) asthma:     (-) sleep apnea                           Cardiovascular:Negative CV ROS                      Neuro/Psych:   Negative Neuro/Psych ROS              GI/Hepatic/Renal:   (+) GERD:, PUD,           Endo/Other:    (+) DiabetesType II DM, , hypothyroidism::., .                 Abdominal:   (+) obese,         Vascular:                                        Anesthesia Plan      general     ASA 3             Anesthetic plan and risks discussed with patient. Plan discussed with CRNA.     Attending anesthesiologist reviewed and agrees with Pre Eval

## 2018-10-09 NOTE — H&P
use: No    Sexual activity: Not Asked     Other Topics Concern    None     Social History Narrative    Caffeine: SODA - 1 TEA - 0  COFFEE - 1             Medications Prior to Admission:      Prior to Admission medications    Medication Sig Start Date End Date Taking?  Authorizing Provider   QUEtiapine (SEROQUEL XR) 400 MG extended release tablet TAKE 1 TABLET BY MOUTH NIGHTLY  9/24/18  Yes Rachel Carreon MD   methylphenidate (RITALIN) 10 MG tablet TAKE 1 TABLET BY MOUTH TWO TIMES A DAY . 9/24/18 10/25/18 Yes Rachel Carreon MD   ALPRAZolam Nii Sly) 2 MG tablet TAKE 1 TABLET BY MOUTH THREE TIMES A DAY AS NEEDED FOR SLEEP AND ANXIETY 9/5/18 12/4/18 Yes Rachel Carreon MD   tiZANidine (ZANAFLEX) 4 MG tablet TAKE 1 TABLET BY MOUTH EVERY EIGHT HOURS AS NEEDED FOR HEADACHE 8/28/18  Yes Rachel Carreon MD   TRULICITY 7.46 OO/7.5MN SOPN INJECT THE CONTENTS OF 1 PEN (0.75 MG) INTO THE SKIN ONCE A WEEK 8/24/18  Yes Rachel Carreon MD   VENTOLIN  (33 Base) MCG/ACT inhaler INHALE 2 PUFFS BY MOUTH EVERY SIX HOURS AS NEEDED FOR WHEEZING 7/30/18  Yes Rachel Carreon MD   clomiPRAMINE (ANAFRANIL) 50 MG capsule Take 1 capsule by mouth nightly 6/5/18  Yes Rachel Carreon MD   levothyroxine (SYNTHROID) 50 MCG tablet TAKE 1 TABLET BY MOUTH TWO TIMES A DAY  5/7/18  Yes Rachel Carreon MD   busPIRone (BUSPAR) 30 MG tablet TAKE 1 AND 1/2 TABLETS BY MOUTH TWO TIMES A DAY  5/7/18  Yes Rachel Carreon MD   lamoTRIgine (LAMICTAL) 200 MG tablet TAKE 1 TABLET BY MOUTH TWO TIMES A DAY  5/7/18  Yes Rachel Carreon MD   SUMAtriptan (IMITREX) 50 MG tablet TAKE 1 TABLET BY MOUTH ONE TIME A DAY AS NEEDED FOR MIGRAINE 5/3/18  Yes Rachel Carreon MD   atorvastatin (LIPITOR) 40 MG tablet TAKE 1 TABLET BY MOUTH ONE TIME A DAY  4/24/18  Yes Rachel Carreon MD   omeprazole (PRILOSEC) 40 MG delayed release capsule TAKE 1 CAPSULE BY MOUTH TWO TIMES A DAY 4/11/18  Yes Pradip Hahn,

## 2018-10-10 ENCOUNTER — APPOINTMENT (OUTPATIENT)
Dept: GENERAL RADIOLOGY | Age: 38
DRG: 621 | End: 2018-10-10
Attending: SURGERY
Payer: COMMERCIAL

## 2018-10-10 LAB
ANION GAP SERPL CALCULATED.3IONS-SCNC: 11 MMOL/L (ref 3–16)
BUN BLDV-MCNC: 4 MG/DL (ref 7–20)
CALCIUM SERPL-MCNC: 8.5 MG/DL (ref 8.3–10.6)
CHLORIDE BLD-SCNC: 99 MMOL/L (ref 99–110)
CO2: 27 MMOL/L (ref 21–32)
CREAT SERPL-MCNC: <0.5 MG/DL (ref 0.6–1.1)
GFR AFRICAN AMERICAN: >60
GFR NON-AFRICAN AMERICAN: >60
GLUCOSE BLD-MCNC: 121 MG/DL (ref 70–99)
GLUCOSE BLD-MCNC: 131 MG/DL (ref 70–99)
GLUCOSE BLD-MCNC: 141 MG/DL (ref 70–99)
HCT VFR BLD CALC: 40 % (ref 36–48)
HEMOGLOBIN: 13 G/DL (ref 12–16)
MCH RBC QN AUTO: 26.8 PG (ref 26–34)
MCHC RBC AUTO-ENTMCNC: 32.4 G/DL (ref 31–36)
MCV RBC AUTO: 82.5 FL (ref 80–100)
PDW BLD-RTO: 14.8 % (ref 12.4–15.4)
PERFORMED ON: ABNORMAL
PERFORMED ON: ABNORMAL
PLATELET # BLD: 296 K/UL (ref 135–450)
PMV BLD AUTO: 7.5 FL (ref 5–10.5)
POTASSIUM REFLEX MAGNESIUM: 4.1 MMOL/L (ref 3.5–5.1)
RBC # BLD: 4.85 M/UL (ref 4–5.2)
SODIUM BLD-SCNC: 137 MMOL/L (ref 136–145)
WBC # BLD: 14.8 K/UL (ref 4–11)

## 2018-10-10 PROCEDURE — 74241 FL UGI W KUB: CPT

## 2018-10-10 PROCEDURE — 6370000000 HC RX 637 (ALT 250 FOR IP): Performed by: SURGERY

## 2018-10-10 PROCEDURE — C9113 INJ PANTOPRAZOLE SODIUM, VIA: HCPCS | Performed by: SURGERY

## 2018-10-10 PROCEDURE — 6360000002 HC RX W HCPCS: Performed by: SURGERY

## 2018-10-10 PROCEDURE — 36415 COLL VENOUS BLD VENIPUNCTURE: CPT

## 2018-10-10 PROCEDURE — 80048 BASIC METABOLIC PNL TOTAL CA: CPT

## 2018-10-10 PROCEDURE — 94640 AIRWAY INHALATION TREATMENT: CPT

## 2018-10-10 PROCEDURE — 2580000003 HC RX 258: Performed by: SURGERY

## 2018-10-10 PROCEDURE — 1200000000 HC SEMI PRIVATE

## 2018-10-10 PROCEDURE — 94760 N-INVAS EAR/PLS OXIMETRY 1: CPT

## 2018-10-10 PROCEDURE — 85027 COMPLETE CBC AUTOMATED: CPT

## 2018-10-10 PROCEDURE — 94770 HC ETCO2 MONITOR DAILY: CPT

## 2018-10-10 PROCEDURE — 2500000003 HC RX 250 WO HCPCS: Performed by: SURGERY

## 2018-10-10 PROCEDURE — 6370000000 HC RX 637 (ALT 250 FOR IP): Performed by: NURSE PRACTITIONER

## 2018-10-10 PROCEDURE — 6360000002 HC RX W HCPCS: Performed by: NURSE PRACTITIONER

## 2018-10-10 RX ORDER — LAMOTRIGINE 100 MG/1
200 TABLET ORAL 2 TIMES DAILY
Status: DISCONTINUED | OUTPATIENT
Start: 2018-10-10 | End: 2018-10-11 | Stop reason: HOSPADM

## 2018-10-10 RX ORDER — OXYCODONE HCL 5 MG/5 ML
5 SOLUTION, ORAL ORAL EVERY 4 HOURS PRN
Status: DISCONTINUED | OUTPATIENT
Start: 2018-10-10 | End: 2018-10-11 | Stop reason: HOSPADM

## 2018-10-10 RX ORDER — MORPHINE SULFATE 2 MG/ML
2 INJECTION, SOLUTION INTRAMUSCULAR; INTRAVENOUS EVERY 4 HOURS PRN
Status: DISCONTINUED | OUTPATIENT
Start: 2018-10-10 | End: 2018-10-11 | Stop reason: HOSPADM

## 2018-10-10 RX ORDER — IPRATROPIUM BROMIDE AND ALBUTEROL SULFATE 2.5; .5 MG/3ML; MG/3ML
1 SOLUTION RESPIRATORY (INHALATION) EVERY 4 HOURS
Status: DISCONTINUED | OUTPATIENT
Start: 2018-10-10 | End: 2018-10-10

## 2018-10-10 RX ORDER — MORPHINE SULFATE 4 MG/ML
4 INJECTION, SOLUTION INTRAMUSCULAR; INTRAVENOUS EVERY 4 HOURS PRN
Status: DISCONTINUED | OUTPATIENT
Start: 2018-10-10 | End: 2018-10-11 | Stop reason: HOSPADM

## 2018-10-10 RX ORDER — BUSPIRONE HYDROCHLORIDE 10 MG/1
30 TABLET ORAL 2 TIMES DAILY
Status: DISCONTINUED | OUTPATIENT
Start: 2018-10-10 | End: 2018-10-10

## 2018-10-10 RX ORDER — DEXTROSE MONOHYDRATE 25 G/50ML
12.5 INJECTION, SOLUTION INTRAVENOUS PRN
Status: DISCONTINUED | OUTPATIENT
Start: 2018-10-10 | End: 2018-10-11 | Stop reason: HOSPADM

## 2018-10-10 RX ORDER — ALBUTEROL SULFATE 2.5 MG/3ML
2.5 SOLUTION RESPIRATORY (INHALATION)
Status: DISCONTINUED | OUTPATIENT
Start: 2018-10-10 | End: 2018-10-10

## 2018-10-10 RX ORDER — CLOMIPRAMINE HYDROCHLORIDE 50 MG/1
50 CAPSULE ORAL NIGHTLY
Status: DISCONTINUED | OUTPATIENT
Start: 2018-10-10 | End: 2018-10-11 | Stop reason: HOSPADM

## 2018-10-10 RX ORDER — CLOMIPRAMINE HYDROCHLORIDE 25 MG/1
50 CAPSULE ORAL NIGHTLY
Status: DISCONTINUED | OUTPATIENT
Start: 2018-10-10 | End: 2018-10-10 | Stop reason: SDUPTHER

## 2018-10-10 RX ORDER — BUDESONIDE 0.5 MG/2ML
0.5 INHALANT ORAL 2 TIMES DAILY
Status: DISCONTINUED | OUTPATIENT
Start: 2018-10-10 | End: 2018-10-11 | Stop reason: HOSPADM

## 2018-10-10 RX ORDER — OXYCODONE HCL 5 MG/5 ML
10 SOLUTION, ORAL ORAL EVERY 4 HOURS PRN
Status: DISCONTINUED | OUTPATIENT
Start: 2018-10-10 | End: 2018-10-11 | Stop reason: HOSPADM

## 2018-10-10 RX ORDER — NICOTINE POLACRILEX 4 MG
15 LOZENGE BUCCAL PRN
Status: DISCONTINUED | OUTPATIENT
Start: 2018-10-10 | End: 2018-10-11 | Stop reason: HOSPADM

## 2018-10-10 RX ORDER — ALBUTEROL SULFATE 2.5 MG/3ML
2.5 SOLUTION RESPIRATORY (INHALATION) EVERY 4 HOURS PRN
Status: DISCONTINUED | OUTPATIENT
Start: 2018-10-10 | End: 2018-10-11 | Stop reason: HOSPADM

## 2018-10-10 RX ORDER — DEXTROSE MONOHYDRATE 50 MG/ML
100 INJECTION, SOLUTION INTRAVENOUS PRN
Status: DISCONTINUED | OUTPATIENT
Start: 2018-10-10 | End: 2018-10-11 | Stop reason: HOSPADM

## 2018-10-10 RX ORDER — IPRATROPIUM BROMIDE AND ALBUTEROL SULFATE 2.5; .5 MG/3ML; MG/3ML
1 SOLUTION RESPIRATORY (INHALATION)
Status: DISCONTINUED | OUTPATIENT
Start: 2018-10-10 | End: 2018-10-11 | Stop reason: HOSPADM

## 2018-10-10 RX ADMIN — ENOXAPARIN SODIUM 40 MG: 40 INJECTION SUBCUTANEOUS at 20:07

## 2018-10-10 RX ADMIN — SODIUM CHLORIDE, POTASSIUM CHLORIDE, SODIUM LACTATE AND CALCIUM CHLORIDE: 600; 310; 30; 20 INJECTION, SOLUTION INTRAVENOUS at 20:06

## 2018-10-10 RX ADMIN — ENOXAPARIN SODIUM 40 MG: 40 INJECTION SUBCUTANEOUS at 08:03

## 2018-10-10 RX ADMIN — ONDANSETRON HYDROCHLORIDE 4 MG: 2 SOLUTION INTRAMUSCULAR; INTRAVENOUS at 08:03

## 2018-10-10 RX ADMIN — MORPHINE SULFATE 4 MG: 4 INJECTION INTRAVENOUS at 08:04

## 2018-10-10 RX ADMIN — LABETALOL HYDROCHLORIDE 5 MG: 5 INJECTION INTRAVENOUS at 00:06

## 2018-10-10 RX ADMIN — ONDANSETRON HYDROCHLORIDE 4 MG: 2 SOLUTION INTRAMUSCULAR; INTRAVENOUS at 17:05

## 2018-10-10 RX ADMIN — OXYCODONE HYDROCHLORIDE 10 MG: 5 SOLUTION ORAL at 13:56

## 2018-10-10 RX ADMIN — IPRATROPIUM BROMIDE AND ALBUTEROL SULFATE 3 ML: .5; 3 SOLUTION RESPIRATORY (INHALATION) at 20:26

## 2018-10-10 RX ADMIN — BUDESONIDE 500 MCG: 0.5 SUSPENSION RESPIRATORY (INHALATION) at 20:26

## 2018-10-10 RX ADMIN — Medication 10 ML: at 08:04

## 2018-10-10 RX ADMIN — METOCLOPRAMIDE 10 MG: 5 INJECTION, SOLUTION INTRAMUSCULAR; INTRAVENOUS at 02:20

## 2018-10-10 RX ADMIN — PANTOPRAZOLE SODIUM 40 MG: 40 INJECTION, POWDER, FOR SOLUTION INTRAVENOUS at 08:03

## 2018-10-10 RX ADMIN — ALBUTEROL SULFATE 2.5 MG: 2.5 SOLUTION RESPIRATORY (INHALATION) at 07:26

## 2018-10-10 RX ADMIN — LAMOTRIGINE 200 MG: 100 TABLET ORAL at 20:07

## 2018-10-10 RX ADMIN — MORPHINE SULFATE 4 MG: 4 INJECTION INTRAVENOUS at 22:54

## 2018-10-10 RX ADMIN — BUSPIRONE HYDROCHLORIDE 45 MG: 10 TABLET ORAL at 20:07

## 2018-10-10 RX ADMIN — PROCHLORPERAZINE EDISYLATE 10 MG: 5 INJECTION INTRAMUSCULAR; INTRAVENOUS at 08:41

## 2018-10-10 RX ADMIN — CLOMIPRAMINE HYDROCHLORIDE 50 MG: 50 CAPSULE ORAL at 22:54

## 2018-10-10 RX ADMIN — OXYCODONE HYDROCHLORIDE 10 MG: 5 SOLUTION ORAL at 20:07

## 2018-10-10 ASSESSMENT — PAIN SCALES - GENERAL
PAINLEVEL_OUTOF10: 2
PAINLEVEL_OUTOF10: 3
PAINLEVEL_OUTOF10: 8
PAINLEVEL_OUTOF10: 7
PAINLEVEL_OUTOF10: 7
PAINLEVEL_OUTOF10: 10
PAINLEVEL_OUTOF10: 7
PAINLEVEL_OUTOF10: 7

## 2018-10-10 ASSESSMENT — PAIN DESCRIPTION - LOCATION
LOCATION: ABDOMEN

## 2018-10-10 ASSESSMENT — PAIN DESCRIPTION - PAIN TYPE
TYPE: SURGICAL PAIN

## 2018-10-10 ASSESSMENT — PAIN DESCRIPTION - DESCRIPTORS
DESCRIPTORS: ACHING

## 2018-10-10 ASSESSMENT — PAIN DESCRIPTION - ORIENTATION: ORIENTATION: MID

## 2018-10-10 ASSESSMENT — PAIN DESCRIPTION - ONSET
ONSET: GRADUAL
ONSET: ON-GOING
ONSET: ON-GOING

## 2018-10-10 ASSESSMENT — PAIN DESCRIPTION - PROGRESSION
CLINICAL_PROGRESSION: NOT CHANGED
CLINICAL_PROGRESSION: NOT CHANGED
CLINICAL_PROGRESSION: GRADUALLY WORSENING

## 2018-10-10 ASSESSMENT — PAIN DESCRIPTION - FREQUENCY
FREQUENCY: CONTINUOUS

## 2018-10-10 NOTE — PROGRESS NOTES
therapeutic interventions upon initiation of aerosolized medication. 2. Physician will be contacted for respiratory rate (RR) greater than 35 breaths per minute. Therapy will be held for heart rate (HR) greater than 140 beats per minute, pending direction from physician. 3. Bronchodilators will be administered via Metered Dose Inhaler (MDI) with spacer when the following criteria are met:  a. Alert and cooperative     b. HR < 140 bpm  c. RR < 30 bpm                d. Can demonstrate a 2-3 second inspiratory hold  4. Bronchodilators will be administered via Hand Held Nebulizer MEJ[facility]) to patients when ANY of the following criteria are met  a. Incognizant or uncooperative          b. Patients treated with HHN at Home        c. Unable to demonstrate proper use of MDI with spacer     d. RR > 30 bpm   5. Bronchodilators will be delivered via Metered Dose Inhaler (MDI), HHN, Aerogen to intubated patients on mechanical ventilation. 6. Inhalation medication orders will be delivered and/or substituted as outlined below. Aerosolized Medications Ordering and Administration Guidelines:    1. All Medications will be ordered by a physician, and their frequency and/or modality will be adjusted as defined by the patients Respiratory Severity Index (RSI) score. 2. If the patient does not have documented COPD, consider discontinuing anticholinergics when RSI is less than 9.  3. If the bronchospasm worsens (increased RSI), then the bronchodilator frequency can be increased to a maximum of every 4 hours. If greater than every 4 hours is required, the physician will be contacted. 4. If the bronchospasm improves, the frequency of the bronchodilator can be decreased, based on the patient's RSI, but not less than home treatment regimen frequency. 5. Bronchodilator(s) will be discontinued if patient has a RSI less than 9 and has received no scheduled or as needed treatment for 72  Hrs.     Patients Ordered on a Mucolytic Agent: 1. Must always be administered with a bronchodilator. 2. Discontinue if patient experiences worsened bronchospasm, or secretions have lessened to the point that the patient is able to clear them with a cough. Anti-inflammatory and Combination Medications:    1. If the patient lacks prior history of lung disease, is not using inhaled anti-inflammatory medication at home, and lacks wheezing by examination or by history for at least 24 hours, contact physician for possible discontinuation.

## 2018-10-10 NOTE — CONSULTS
Clinical Pharmacy Consult Note    ADMIT DATE: 10/9/2018     33 yo F admitted s/p robotic assisted laparoscopic sleeve gastrectomy (POD# 1). Pharmacy asked by Diomedes Liriano to review home medications to determine which can be crushed or changed to alternative formulations. PROPHYLAXIS:  Stress ulcer: pantoprazole 40 mg IV daily  VTE:  Enoxaparin 40 mg BID    ASSESSMENT/PLAN:  1)  Medication Review:  · Reviewed patient's home medications to determine which can be crushed or changed to alternative (i.e., liquid) formulation. · Recommendations:  · Alprazolam -- per [employer], medication can be administered sublingually if oral administration is not possible; absorption and onset of effect are comparable to oral administration  · Atorvastatin-- recommends that tablets should not be broken  · Buspirone--can be crushed  · Cetirizine--can be crushed  · Clomipramine--can be crushed  · Lamotrigine--no clear data on if tablets can be crushed; is also commercially available as both chewable and disintegrating tablet (unsure if these would be covered on patient's insurance or cost of these medications w/ her insurance). · Levothyroxine--tablet can be crushed and suspended in 5-10 mL of water (suspension must be used immediately). · Linaclotide--capsule cannot be opened  · Metformin--can be crushed if not extended release tablets. · Methylphenidate--unsure if patient is on extended-release/controlled-release tablets, which cannot be crushed. · Omeprazole--capsules cannot be opened; alternative would be pantoprazole packets or lansoprazole (capsules can be opened and intact granules can be sprinkled with 1 tablespoon of applesauce, yogurt, cottage cheese). · Quetiapine XR--extended-release tablets cannot be crushed  · Sumatriptan--not recommended to crush tablets; medication is PRN. · Tizanidine--no data on if tablets can be crushed. Please call with any questions.   Harrison Stone, PharmD,

## 2018-10-10 NOTE — PROGRESS NOTES
Pt admitted to unit around 1515 via bed from PACU with spouse. Pt A & O x 4, sleepy but quick to arouse. VSS. Lungs clear to dim in bases. 3 L O2 per NC with capnography in place. No adventitious heart sounds. Bowel soungs absent. 6 lap sites with surgical glue CDI. [place]acios in place. Diet NPO. Pain pump per orders. Handoff completed with PACU nurse. Since arrival pt has been up to chair. CO dizziness when up. Educated on ambulation. Pt co nausea on arrival. PRN Zofran given. Reported effective. Oriented to call light system. Spouse at bedside. Will cont to monitor.

## 2018-10-11 ENCOUNTER — APPOINTMENT (OUTPATIENT)
Dept: GENERAL RADIOLOGY | Age: 38
DRG: 621 | End: 2018-10-11
Attending: SURGERY
Payer: COMMERCIAL

## 2018-10-11 VITALS
OXYGEN SATURATION: 91 % | HEIGHT: 65 IN | TEMPERATURE: 98.7 F | WEIGHT: 293 LBS | SYSTOLIC BLOOD PRESSURE: 137 MMHG | HEART RATE: 108 BPM | DIASTOLIC BLOOD PRESSURE: 90 MMHG | RESPIRATION RATE: 16 BRPM | BODY MASS INDEX: 48.82 KG/M2

## 2018-10-11 LAB
GLUCOSE BLD-MCNC: 135 MG/DL (ref 70–99)
GLUCOSE BLD-MCNC: 135 MG/DL (ref 70–99)
PERFORMED ON: ABNORMAL
PERFORMED ON: ABNORMAL

## 2018-10-11 PROCEDURE — 94760 N-INVAS EAR/PLS OXIMETRY 1: CPT

## 2018-10-11 PROCEDURE — 71046 X-RAY EXAM CHEST 2 VIEWS: CPT

## 2018-10-11 PROCEDURE — 6370000000 HC RX 637 (ALT 250 FOR IP): Performed by: NURSE PRACTITIONER

## 2018-10-11 PROCEDURE — 2700000000 HC OXYGEN THERAPY PER DAY

## 2018-10-11 PROCEDURE — C9113 INJ PANTOPRAZOLE SODIUM, VIA: HCPCS | Performed by: SURGERY

## 2018-10-11 PROCEDURE — 6360000002 HC RX W HCPCS: Performed by: NURSE PRACTITIONER

## 2018-10-11 PROCEDURE — 94640 AIRWAY INHALATION TREATMENT: CPT

## 2018-10-11 PROCEDURE — 6360000002 HC RX W HCPCS: Performed by: SURGERY

## 2018-10-11 PROCEDURE — 6370000000 HC RX 637 (ALT 250 FOR IP): Performed by: SURGERY

## 2018-10-11 RX ORDER — ONDANSETRON 4 MG/1
8 TABLET, ORALLY DISINTEGRATING ORAL EVERY 8 HOURS PRN
Qty: 60 TABLET | Refills: 0 | Status: SHIPPED | OUTPATIENT
Start: 2018-10-11 | End: 2018-10-21

## 2018-10-11 RX ORDER — OXYCODONE HYDROCHLORIDE AND ACETAMINOPHEN 5; 325 MG/1; MG/1
1 TABLET ORAL EVERY 6 HOURS PRN
Qty: 28 TABLET | Refills: 0 | Status: SHIPPED | OUTPATIENT
Start: 2018-10-11 | End: 2018-10-18

## 2018-10-11 RX ADMIN — OXYCODONE HYDROCHLORIDE 10 MG: 5 SOLUTION ORAL at 11:15

## 2018-10-11 RX ADMIN — BUDESONIDE 500 MCG: 0.5 SUSPENSION RESPIRATORY (INHALATION) at 08:08

## 2018-10-11 RX ADMIN — OXYCODONE HYDROCHLORIDE 5 MG: 5 SOLUTION ORAL at 01:22

## 2018-10-11 RX ADMIN — OXYCODONE HYDROCHLORIDE 5 MG: 5 SOLUTION ORAL at 07:25

## 2018-10-11 RX ADMIN — IPRATROPIUM BROMIDE AND ALBUTEROL SULFATE 3 ML: .5; 3 SOLUTION RESPIRATORY (INHALATION) at 11:17

## 2018-10-11 RX ADMIN — ENOXAPARIN SODIUM 40 MG: 40 INJECTION SUBCUTANEOUS at 09:47

## 2018-10-11 RX ADMIN — IPRATROPIUM BROMIDE AND ALBUTEROL SULFATE 3 ML: .5; 3 SOLUTION RESPIRATORY (INHALATION) at 08:08

## 2018-10-11 RX ADMIN — PANTOPRAZOLE SODIUM 40 MG: 40 INJECTION, POWDER, FOR SOLUTION INTRAVENOUS at 09:47

## 2018-10-11 ASSESSMENT — PAIN DESCRIPTION - PROGRESSION
CLINICAL_PROGRESSION: NOT CHANGED
CLINICAL_PROGRESSION: NOT CHANGED

## 2018-10-11 ASSESSMENT — PAIN SCALES - GENERAL
PAINLEVEL_OUTOF10: 5
PAINLEVEL_OUTOF10: 8
PAINLEVEL_OUTOF10: 6

## 2018-10-11 NOTE — DISCHARGE SUMMARY
Discharge Summary      Patient:  Concha Castano Date: 10/9/2018  8:28 AM    Discharge Date: 10/11/18    Admitting Physician: Kayla Poole DO     Discharge Physician: same    Admitting Diagnosis:  Morbid obesity with BMI of 50.0-59.9, adult [facility])     Discharge Diagnosis: same     Past Medical History:   Diagnosis Date    Anxiety     Arthritis     knees, ankle, wrists    Asthma     Back pain     Body piercing     retained in nose/ears    Chronic constipation 12/23/2014    Depression 6/9/2011    Diabetes mellitus ([address]     Fatty liver     GERD (gastroesophageal reflux disease)     Hyperlipidemia     Hypothyroidism 6/9/2011    Migraine     Obesity     Other ovarian dysfunction (luteal est/progest deficiency) 6/9/2011    PCOS (polycystic ovarian syndrome)     Prolonged emergence from general anesthesia     Vitamin D deficiency 6/9/2011        Indication for Admission:   Morbid Obesity s/p Robotic assisted Laparoscopic Sleeve Gastrectomy  - Robotic assisted Laparoscopic Hiatal Hernia Repair     Hospital Course:   Agnes Donovan is a 34year old female with a history of morbid obesity, asthma, DM, and GERD admitted s/p Robotic assisted Laparoscopic Sleeve Gastrectomy, Robotic assisted Laparoscopic Hiatal Hernia Repair. Procedures were tolerated well. Pain controlled with medications. Denied nausea or vomiting. POD 1- No acute events overnight. Pain controlled with PCA, transitioned to oral medications. UGI negative, started on BCLD. Denies nausea or vomiting. POD 2- No acute events overnight. Pain controlled with medications. Denies nausea or vomiting, tolerating BCLD. O2 saturation decreased overnight, patient with breathing treatments, encourage IS, and deep breathing. Improved prior to DC Instructed to follow up with PCP as an outpatient if continues. Stable for DC to home.      Procedures:  Robotic assisted Laparoscopic Sleeve Gastrectomy  - Robotic assisted Laparoscopic Hiatal

## 2018-10-12 ENCOUNTER — TELEPHONE (OUTPATIENT)
Dept: FAMILY MEDICINE CLINIC | Age: 38
End: 2018-10-12

## 2018-10-15 ENCOUNTER — TELEPHONE (OUTPATIENT)
Dept: BARIATRICS/WEIGHT MGMT | Age: 38
End: 2018-10-15

## 2018-10-15 DIAGNOSIS — Z98.84 S/P LAPAROSCOPIC SLEEVE GASTRECTOMY: Primary | ICD-10-CM

## 2018-10-16 ENCOUNTER — TELEPHONE (OUTPATIENT)
Dept: BARIATRICS/WEIGHT MGMT | Age: 38
End: 2018-10-16

## 2018-10-16 ENCOUNTER — PATIENT MESSAGE (OUTPATIENT)
Dept: BARIATRICS/WEIGHT MGMT | Age: 38
End: 2018-10-16

## 2018-10-16 NOTE — TELEPHONE ENCOUNTER
Patient sent a [employer] message stating\" Im getting a cold and need to know if there's anything I can take for it. Mucinex? I'm assuming any Tylenol product but want to make sure before doing anything. Mucinex and Sudafed are my go-tos\". Please advise what the patient can take for her cold, she is s/p sleeve 10/9/18.

## 2018-10-25 ENCOUNTER — OFFICE VISIT (OUTPATIENT)
Dept: BARIATRICS/WEIGHT MGMT | Age: 38
End: 2018-10-25

## 2018-10-25 VITALS
HEIGHT: 64 IN | BODY MASS INDEX: 50.02 KG/M2 | HEART RATE: 97 BPM | SYSTOLIC BLOOD PRESSURE: 117 MMHG | DIASTOLIC BLOOD PRESSURE: 82 MMHG | WEIGHT: 293 LBS

## 2018-10-25 DIAGNOSIS — Z98.84 S/P LAPAROSCOPIC SLEEVE GASTRECTOMY: Primary | ICD-10-CM

## 2018-10-25 PROCEDURE — 99024 POSTOP FOLLOW-UP VISIT: CPT | Performed by: SURGERY

## 2018-10-25 RX ORDER — OMEPRAZOLE 40 MG/1
CAPSULE, DELAYED RELEASE ORAL
Qty: 60 CAPSULE | Refills: 3 | Status: SHIPPED | OUTPATIENT
Start: 2018-10-25 | End: 2019-03-25 | Stop reason: SDUPTHER

## 2018-10-30 ENCOUNTER — TELEPHONE (OUTPATIENT)
Dept: BARIATRICS/WEIGHT MGMT | Age: 38
End: 2018-10-30

## 2018-10-30 DIAGNOSIS — T81.49XA INFECTED INCISION: Primary | ICD-10-CM

## 2018-10-30 RX ORDER — SULFAMETHOXAZOLE AND TRIMETHOPRIM 800; 160 MG/1; MG/1
1 TABLET ORAL 2 TIMES DAILY
Qty: 20 TABLET | Refills: 0 | Status: SHIPPED | OUTPATIENT
Start: 2018-10-30 | End: 2018-11-09

## 2018-10-30 NOTE — TELEPHONE ENCOUNTER
Responded back to patient thru April [facility]s 53 ordering bactrim DS for her taken twice daily by mouth and that he would like to see her in office for a wound check on Thursday at [facility].

## 2018-10-31 ENCOUNTER — PATIENT MESSAGE (OUTPATIENT)
Dept: FAMILY MEDICINE CLINIC | Age: 38
End: 2018-10-31

## 2018-11-01 RX ORDER — BUSPIRONE HYDROCHLORIDE 30 MG/1
TABLET ORAL
Qty: 90 TABLET | Refills: 5 | Status: SHIPPED | OUTPATIENT
Start: 2018-11-01 | End: 2019-05-14 | Stop reason: SDUPTHER

## 2018-11-08 ENCOUNTER — PATIENT MESSAGE (OUTPATIENT)
Dept: FAMILY MEDICINE CLINIC | Age: 38
End: 2018-11-08

## 2018-11-08 DIAGNOSIS — F98.8 ATTENTION DEFICIT DISORDER (ADD) WITHOUT HYPERACTIVITY: ICD-10-CM

## 2018-11-08 RX ORDER — METHYLPHENIDATE HYDROCHLORIDE 10 MG/1
TABLET ORAL
Qty: 60 TABLET | Refills: 0 | Status: SHIPPED | OUTPATIENT
Start: 2018-11-08 | End: 2018-11-29 | Stop reason: SDUPTHER

## 2018-11-27 ENCOUNTER — TELEPHONE (OUTPATIENT)
Dept: BARIATRICS/WEIGHT MGMT | Age: 38
End: 2018-11-27

## 2018-11-29 ENCOUNTER — OFFICE VISIT (OUTPATIENT)
Dept: BARIATRICS/WEIGHT MGMT | Age: 38
End: 2018-11-29

## 2018-11-29 VITALS
WEIGHT: 292 LBS | HEIGHT: 64 IN | SYSTOLIC BLOOD PRESSURE: 125 MMHG | HEART RATE: 97 BPM | DIASTOLIC BLOOD PRESSURE: 81 MMHG | BODY MASS INDEX: 49.85 KG/M2

## 2018-11-29 DIAGNOSIS — Z98.84 S/P LAPAROSCOPIC SLEEVE GASTRECTOMY: Primary | ICD-10-CM

## 2018-11-29 PROCEDURE — 99024 POSTOP FOLLOW-UP VISIT: CPT | Performed by: SURGERY

## 2018-12-06 RX ORDER — SUMATRIPTAN 50 MG/1
TABLET, FILM COATED ORAL
Qty: 9 TABLET | Refills: 2 | Status: SHIPPED | OUTPATIENT
Start: 2018-12-06 | End: 2019-05-11 | Stop reason: SDUPTHER

## 2018-12-08 ENCOUNTER — PATIENT MESSAGE (OUTPATIENT)
Dept: FAMILY MEDICINE CLINIC | Age: 38
End: 2018-12-08

## 2018-12-10 RX ORDER — ALBUTEROL SULFATE 90 UG/1
2 AEROSOL, METERED RESPIRATORY (INHALATION) EVERY 6 HOURS PRN
Qty: 1 INHALER | Refills: 0 | Status: SHIPPED | OUTPATIENT
Start: 2018-12-10 | End: 2019-12-27

## 2019-01-21 RX ORDER — CYCLOBENZAPRINE HCL 10 MG
TABLET ORAL
Qty: 30 TABLET | Refills: 1 | Status: SHIPPED | OUTPATIENT
Start: 2019-01-21 | End: 2019-03-06 | Stop reason: SDUPTHER

## 2019-02-23 DIAGNOSIS — E78.2 MIXED HYPERLIPIDEMIA: ICD-10-CM

## 2019-02-25 DIAGNOSIS — E78.2 MIXED HYPERLIPIDEMIA: ICD-10-CM

## 2019-02-25 RX ORDER — ATORVASTATIN CALCIUM 40 MG/1
TABLET, FILM COATED ORAL
Qty: 30 TABLET | Refills: 5 | Status: SHIPPED | OUTPATIENT
Start: 2019-02-25 | End: 2019-09-20 | Stop reason: CLARIF

## 2019-02-25 RX ORDER — LEVOTHYROXINE SODIUM 0.05 MG/1
TABLET ORAL
Qty: 60 TABLET | Refills: 1 | Status: SHIPPED | OUTPATIENT
Start: 2019-02-25 | End: 2019-06-21 | Stop reason: SDUPTHER

## 2019-02-25 RX ORDER — LAMOTRIGINE 200 MG/1
TABLET ORAL
Qty: 60 TABLET | Refills: 1 | Status: SHIPPED | OUTPATIENT
Start: 2019-02-25 | End: 2019-04-23 | Stop reason: SDUPTHER

## 2019-02-25 RX ORDER — CLOMIPRAMINE HYDROCHLORIDE 50 MG/1
CAPSULE ORAL
Qty: 60 CAPSULE | Refills: 1 | Status: SHIPPED | OUTPATIENT
Start: 2019-02-25 | End: 2019-03-25

## 2019-02-25 RX ORDER — ATORVASTATIN CALCIUM 40 MG/1
TABLET, FILM COATED ORAL
Qty: 30 TABLET | Refills: 1 | Status: SHIPPED | OUTPATIENT
Start: 2019-02-25 | End: 2019-02-25 | Stop reason: SDUPTHER

## 2019-03-07 RX ORDER — CYCLOBENZAPRINE HCL 10 MG
TABLET ORAL
Qty: 30 TABLET | Refills: 0 | Status: SHIPPED | OUTPATIENT
Start: 2019-03-07 | End: 2019-06-21 | Stop reason: SDUPTHER

## 2019-03-21 DIAGNOSIS — F98.8 ATTENTION DEFICIT DISORDER (ADD) WITHOUT HYPERACTIVITY: ICD-10-CM

## 2019-03-22 RX ORDER — METHYLPHENIDATE HYDROCHLORIDE 10 MG/1
TABLET ORAL
Qty: 60 TABLET | Refills: 0 | OUTPATIENT
Start: 2019-03-22 | End: 2019-04-20

## 2019-03-25 ENCOUNTER — OFFICE VISIT (OUTPATIENT)
Dept: FAMILY MEDICINE CLINIC | Age: 39
End: 2019-03-25
Payer: COMMERCIAL

## 2019-03-25 VITALS
SYSTOLIC BLOOD PRESSURE: 124 MMHG | RESPIRATION RATE: 18 BRPM | BODY MASS INDEX: 47.35 KG/M2 | HEART RATE: 91 BPM | DIASTOLIC BLOOD PRESSURE: 80 MMHG | WEIGHT: 278 LBS

## 2019-03-25 DIAGNOSIS — E66.9 DIABETES MELLITUS TYPE 2 IN OBESE (HCC): ICD-10-CM

## 2019-03-25 DIAGNOSIS — F51.04 PSYCHOPHYSIOLOGICAL INSOMNIA: ICD-10-CM

## 2019-03-25 DIAGNOSIS — F41.9 ANXIETY: Primary | ICD-10-CM

## 2019-03-25 DIAGNOSIS — E03.9 HYPOTHYROIDISM, UNSPECIFIED TYPE: ICD-10-CM

## 2019-03-25 DIAGNOSIS — F98.8 ATTENTION DEFICIT DISORDER (ADD) WITHOUT HYPERACTIVITY: ICD-10-CM

## 2019-03-25 DIAGNOSIS — E11.69 DIABETES MELLITUS TYPE 2 IN OBESE (HCC): ICD-10-CM

## 2019-03-25 DIAGNOSIS — E78.2 MIXED HYPERLIPIDEMIA: ICD-10-CM

## 2019-03-25 DIAGNOSIS — F31.81 BIPOLAR 2 DISORDER (HCC): ICD-10-CM

## 2019-03-25 LAB
A/G RATIO: 1.2 (ref 1.1–2.2)
ALBUMIN SERPL-MCNC: 3.8 G/DL (ref 3.4–5)
ALP BLD-CCNC: 109 U/L (ref 40–129)
ALT SERPL-CCNC: 24 U/L (ref 10–40)
ANION GAP SERPL CALCULATED.3IONS-SCNC: 12 MMOL/L (ref 3–16)
AST SERPL-CCNC: 20 U/L (ref 15–37)
BILIRUB SERPL-MCNC: <0.2 MG/DL (ref 0–1)
BUN BLDV-MCNC: 19 MG/DL (ref 7–20)
CALCIUM SERPL-MCNC: 8.9 MG/DL (ref 8.3–10.6)
CHLORIDE BLD-SCNC: 102 MMOL/L (ref 99–110)
CHOLESTEROL, TOTAL: 180 MG/DL (ref 0–199)
CO2: 26 MMOL/L (ref 21–32)
CREAT SERPL-MCNC: 0.6 MG/DL (ref 0.6–1.1)
GFR AFRICAN AMERICAN: >60
GFR NON-AFRICAN AMERICAN: >60
GLOBULIN: 3.1 G/DL
GLUCOSE BLD-MCNC: 102 MG/DL (ref 70–99)
HDLC SERPL-MCNC: 45 MG/DL (ref 40–60)
LDL CHOLESTEROL CALCULATED: 102 MG/DL
POTASSIUM SERPL-SCNC: 4.5 MMOL/L (ref 3.5–5.1)
SODIUM BLD-SCNC: 140 MMOL/L (ref 136–145)
TOTAL PROTEIN: 6.9 G/DL (ref 6.4–8.2)
TRIGL SERPL-MCNC: 167 MG/DL (ref 0–150)
TSH SERPL DL<=0.05 MIU/L-ACNC: 0.64 UIU/ML (ref 0.27–4.2)
VLDLC SERPL CALC-MCNC: 33 MG/DL

## 2019-03-25 PROCEDURE — 99214 OFFICE O/P EST MOD 30 MIN: CPT | Performed by: FAMILY MEDICINE

## 2019-03-26 LAB
ESTIMATED AVERAGE GLUCOSE: 108.3 MG/DL
HBA1C MFR BLD: 5.4 %

## 2019-04-01 ENCOUNTER — PATIENT MESSAGE (OUTPATIENT)
Dept: FAMILY MEDICINE CLINIC | Age: 39
End: 2019-04-01

## 2019-04-01 DIAGNOSIS — F98.8 ATTENTION DEFICIT DISORDER (ADD) WITHOUT HYPERACTIVITY: ICD-10-CM

## 2019-04-01 RX ORDER — METHYLPHENIDATE HYDROCHLORIDE 10 MG/1
TABLET ORAL
Qty: 60 TABLET | Refills: 0 | Status: SHIPPED | OUTPATIENT
Start: 2019-04-01 | End: 2019-04-13 | Stop reason: ALTCHOICE

## 2019-04-01 NOTE — TELEPHONE ENCOUNTER
From: Albert Locke  To: Eli Crocker MD  Sent: 4/1/2019 10:25 AM EDT  Subject: Prescription Question    I'm out of Ritalin. Could you please send an Rx to [facility]?! Jl Olivas!

## 2019-04-03 ENCOUNTER — HOSPITAL ENCOUNTER (OUTPATIENT)
Dept: WOMENS IMAGING | Age: 39
Discharge: HOME | End: 2019-04-03
Payer: COMMERCIAL

## 2019-04-03 DIAGNOSIS — Z12.31 VISIT FOR SCREENING MAMMOGRAM: ICD-10-CM

## 2019-04-03 PROCEDURE — G0279 TOMOSYNTHESIS, MAMMO: HCPCS

## 2019-04-03 PROCEDURE — 77063 BREAST TOMOSYNTHESIS BI: CPT

## 2019-04-05 ENCOUNTER — TELEPHONE (OUTPATIENT)
Dept: FAMILY MEDICINE CLINIC | Age: 39
End: 2019-04-05

## 2019-04-05 NOTE — TELEPHONE ENCOUNTER
Pt called and said she wants to see dr Dena Moran if possible next Friday or even Saturday for a f/u on her [employer] results.   She wants to know if she will be able to see them in [place] as well      Please advise

## 2019-04-09 ENCOUNTER — HOSPITAL ENCOUNTER (OUTPATIENT)
Dept: WOMENS IMAGING | Age: 39
Discharge: HOME | End: 2019-04-09
Payer: COMMERCIAL

## 2019-04-09 ENCOUNTER — HOSPITAL ENCOUNTER (OUTPATIENT)
Dept: ULTRASOUND IMAGING | Age: 39
Discharge: HOME | End: 2019-04-09
Payer: COMMERCIAL

## 2019-04-09 DIAGNOSIS — Z12.39 BREAST CANCER SCREENING: ICD-10-CM

## 2019-04-09 DIAGNOSIS — R92.8 ABNORMAL MAMMOGRAM: ICD-10-CM

## 2019-04-09 PROCEDURE — G0279 TOMOSYNTHESIS, MAMMO: HCPCS

## 2019-04-09 PROCEDURE — 76642 ULTRASOUND BREAST LIMITED: CPT

## 2019-04-13 ENCOUNTER — OFFICE VISIT (OUTPATIENT)
Dept: FAMILY MEDICINE CLINIC | Age: 39
End: 2019-04-13
Payer: COMMERCIAL

## 2019-04-13 VITALS
SYSTOLIC BLOOD PRESSURE: 118 MMHG | WEIGHT: 285 LBS | DIASTOLIC BLOOD PRESSURE: 70 MMHG | HEART RATE: 99 BPM | TEMPERATURE: 98 F | RESPIRATION RATE: 16 BRPM | HEIGHT: 65 IN | BODY MASS INDEX: 47.48 KG/M2

## 2019-04-13 DIAGNOSIS — F41.9 ANXIETY: ICD-10-CM

## 2019-04-13 DIAGNOSIS — F90.0 ATTENTION DEFICIT HYPERACTIVITY DISORDER (ADHD), PREDOMINANTLY INATTENTIVE TYPE: Primary | ICD-10-CM

## 2019-04-13 DIAGNOSIS — G89.4 CHRONIC PAIN SYNDROME: ICD-10-CM

## 2019-04-13 DIAGNOSIS — F51.04 PSYCHOPHYSIOLOGICAL INSOMNIA: ICD-10-CM

## 2019-04-13 DIAGNOSIS — F31.81 BIPOLAR 2 DISORDER (HCC): ICD-10-CM

## 2019-04-13 PROCEDURE — 99214 OFFICE O/P EST MOD 30 MIN: CPT | Performed by: FAMILY MEDICINE

## 2019-04-13 RX ORDER — DEXTROAMPHETAMINE SACCHARATE, AMPHETAMINE ASPARTATE MONOHYDRATE, DEXTROAMPHETAMINE SULFATE AND AMPHETAMINE SULFATE 5; 5; 5; 5 MG/1; MG/1; MG/1; MG/1
20 CAPSULE, EXTENDED RELEASE ORAL EVERY MORNING
Qty: 30 CAPSULE | Refills: 0 | Status: SHIPPED | OUTPATIENT
Start: 2019-04-13 | End: 2019-05-09 | Stop reason: SDUPTHER

## 2019-04-13 RX ORDER — DESVENLAFAXINE 50 MG/1
50 TABLET, EXTENDED RELEASE ORAL DAILY
Qty: 30 TABLET | Refills: 3 | Status: SHIPPED | OUTPATIENT
Start: 2019-04-13 | End: 2019-05-15 | Stop reason: SDUPTHER

## 2019-04-13 RX ORDER — CLOMIPRAMINE HYDROCHLORIDE 25 MG/1
25 CAPSULE ORAL NIGHTLY
Qty: 30 CAPSULE | Refills: 0 | Status: SHIPPED | OUTPATIENT
Start: 2019-04-13 | End: 2019-05-16 | Stop reason: SDUPTHER

## 2019-04-13 RX ORDER — DESVENLAFAXINE 25 MG/1
25 TABLET, EXTENDED RELEASE ORAL DAILY
Qty: 30 TABLET | Refills: 0 | Status: SHIPPED | OUTPATIENT
Start: 2019-04-13 | End: 2019-04-24 | Stop reason: SDUPTHER

## 2019-04-13 NOTE — PROGRESS NOTES
Subjective:      Patient ID: Nathaniel Subramanian is a 34 y.o. female. Blood pressure 118/70, pulse 99, temperature 98 °F (36.7 °C), temperature source Oral, resp. rate 16, height 5' 4.5\" (1.638 m), weight 285 lb (129.3 kg), last menstrual period 04/18/2013, not currently breastfeeding. HPI here for f/u on anxiety, depression, etc.  Recently had genesight testing. Reviewed meds as below. Did not make med changes last visit. She interacts well when she has to (such as at doctor visits or in public), but is continuously feeling anxious, panic. Has DM, recent bariatric surgery (has lost 70 lbs); stopped DM meds- metformin/trulicity    Very irritable still. Wants to sleep constantly. Not sleeping at night still. No energy. Still stress eating. Just wants to lay on the couch all day. Does not like being in crowds, go to parties due to social anxiety symptoms. Hard to go out even with her best friend because she gets overwhelmed with getting herself ready to be presentable. Frequently cancels plans. (but she is often thankful when she follows through with plans and is happy she did). Has seen several psychologist, psychiatrists (Iam Lynn, Dr Niya Michele). Saw sleep med- dx with MELOINE (barely) and did not want to use CPAP. Takes clomip 100, flexeril sporadically, xanax 2 mg TID, Buspar 45 BID, lamictal 200 bid. seroquel 400 nightly. Ritalin 10 bid (helps her 'think straight' and accomplish more at home)  Uses flexeril about 2x a day for body aches/pains. Really helps her feel better. Worse at bedtime- 'can't relax'   We tried tizanidine and that was ineffective. Known side effects: clomip- dry mouth, constipation. No SI. But is not sure she thinks life matters, except for her daughter. No Hallucinations   is supportive.     Patient Active Problem List   Diagnosis    Allergy to eggs    Vitamin D deficiency    PMS (premenstrual syndrome)    Hypothyroidism    PCOD Neck: Normal range of motion. Neck supple. Carotid bruit is not present. No thyromegaly present. Cardiovascular: Normal rate, regular rhythm, normal heart sounds and intact distal pulses. No murmur heard. Pulmonary/Chest: Effort normal and breath sounds normal. No respiratory distress. She has no wheezes. She has no rales. Musculoskeletal: Normal range of motion. She exhibits no edema. Does not have tenderness over usual FM points @ neck, back, but does at epicondyles and medial knees. Neurological: She is alert and oriented to person, place, and time. Skin: Skin is warm and dry. Psychiatric: She has a normal mood and affect. Her behavior is normal. Judgment and thought content normal.   Nursing note and vitals reviewed. Assessment:       Diagnosis Orders   1. Attention deficit hyperactivity disorder (ADHD), predominantly inattentive type  amphetamine-dextroamphetamine (ADDERALL XR) 20 MG extended release capsule   2. Bipolar 2 disorder ([address]     3. Anxiety     4. Psychophysiological insomnia     5. Chronic pain syndrome               Plan:      Counseled patient for >50% appointment time on disease pathophysiology, management, answering questions; total time 25 minutes. discussed comprehensive plan for adjusting meds to adderess her disorders and symptoms. She is still not amenable to talkiing to a therapist, which I think could be very helpful if she has a good one. Med choices guided by Gene-sight testing. Stop ritalin and start adderall xr 20mg. (for both ADHD and energy- for some, energy IS mood). Add pristiq 25 (then 50 mg) for anxiety. Wean off clomip (based on genesight-predicted interaction) over next month.   Continue seroquel 400 for sleep/irritability. (this may be a target for alternative therapy later, based on genesight and need for more irritability treatment)    discussed that some of her anxiety may actually be mini-withdrawals from xanax; thus may change to

## 2019-04-13 NOTE — PATIENT INSTRUCTIONS
Weaning clomipramine: reduce to 50mg x 2 weeks, then 25 mg x 2 weeks, then stop. Pristiq 25 mg for month #1, then 50 mg month #2.

## 2019-05-02 ENCOUNTER — OFFICE VISIT (OUTPATIENT)
Dept: BARIATRICS/WEIGHT MGMT | Age: 39
End: 2019-05-02
Payer: COMMERCIAL

## 2019-05-02 VITALS
BODY MASS INDEX: 47.29 KG/M2 | HEIGHT: 64 IN | DIASTOLIC BLOOD PRESSURE: 80 MMHG | WEIGHT: 277 LBS | HEART RATE: 76 BPM | SYSTOLIC BLOOD PRESSURE: 128 MMHG

## 2019-05-02 DIAGNOSIS — Z98.84 S/P LAPAROSCOPIC SLEEVE GASTRECTOMY: ICD-10-CM

## 2019-05-02 DIAGNOSIS — E66.01 MORBID OBESITY WITH BMI OF 45.0-49.9, ADULT (HCC): Primary | ICD-10-CM

## 2019-05-02 PROCEDURE — 99213 OFFICE O/P EST LOW 20 MIN: CPT | Performed by: SURGERY

## 2019-05-02 NOTE — PROGRESS NOTES
 TONSILLECTOMY  1988    TUBAL LIGATION       Family History   Problem Relation Age of Onset    Diabetes Mother     Asthma Mother     Cancer Mother 36        breast    High Blood Pressure Mother     High Cholesterol Mother     Clotting Disorder Mother    [facility] Migraines Mother     Substance Abuse Mother     Mental Illness Mother     Other Mother         blood clots    Arthritis Mother     High Blood Pressure Father     Arthritis Father     Glaucoma Father     Migraines Brother     Seizures Brother     Mental Illness Brother         undiagnosed but tried to commit suicide    Diabetes Paternal Grandfather     Glaucoma Paternal Grandfather     Coronary Art Dis Paternal Grandfather     Arthritis Paternal Grandfather     Cancer Maternal Grandmother     High Blood Pressure Maternal Grandfather     Cancer Paternal Grandmother     Arthritis Paternal Grandmother      Social History     Tobacco Use    Smoking status: Never Smoker    Smokeless tobacco: Never Used    Tobacco comment: congratulated on nonsmoking status. Substance Use Topics    Alcohol use: Yes     Comment: OCC     I counseled the patient on the importance of not smoking and risks of ETOH. Allergies   Allergen Reactions    Dilaudid [Hydromorphone Hcl]      Migraines    Cipro Xr     Ciprofloxacin Hives     Nausea,diarrhea    Eggs [Egg White] Other (See Comments)     Diarrhea at times    Tape [Adhesive Tape]      Blistering  Can use paper tape     Vitals:    05/02/19 1050   BP: 128/80   Pulse: 76   Weight: 277 lb (125.6 kg)   Height: 5' 4.25\" (1.632 m)       Body mass index is 47.18 kg/m².       Current Outpatient Medications:     Calcium Citrate-Vitamin D (CITRACAL PETITES/VITAMIN D PO), Take 2 tablets by mouth daily, Disp: , Rfl:     Multiple Vitamins-Minerals (CENTRUM WOMEN PO), Take 2 tablets by mouth daily, Disp: , Rfl:     cyclobenzaprine (FLEXERIL) 10 MG tablet, TAKE 1 TABLET BY MOUTH EVERY EIGHT HOURS AS NEEDED FOR MUSCLE SPASM, Disp: 30 tablet, Rfl: 0    desvenlafaxine succinate (PRISTIQ) 25 MG TB24 extended release tablet, TAKE 1 TABLET BY MOUTH ONE TIME A DAY , Disp: 30 tablet, Rfl: 5    metFORMIN (GLUCOPHAGE) 500 MG tablet, TAKE 1 TABLET BY MOUTH THREE TIMES A DAY WITH MEALS, Disp: 90 tablet, Rfl: 5    lamoTRIgine (LAMICTAL) 200 MG tablet, TAKE 1 TABLET BY MOUTH TWO TIMES A DAY , Disp: 60 tablet, Rfl: 5    atorvastatin (LIPITOR) 40 MG tablet, TAKE 1 TABLET BY MOUTH ONE TIME A DAY , Disp: 30 tablet, Rfl: 5    clomiPRAMINE (ANAFRANIL) 25 MG capsule, Take 1 capsule by mouth nightly, Disp: 30 capsule, Rfl: 0    amphetamine-dextroamphetamine (ADDERALL XR) 20 MG extended release capsule, Take 1 capsule by mouth every morning for 30 days. , Disp: 30 capsule, Rfl: 0    desvenlafaxine succinate (PRISTIQ) 50 MG TB24 extended release tablet, Take 1 tablet by mouth daily (after using 25 mg for a month), Disp: 30 tablet, Rfl: 3    omeprazole (PRILOSEC) 40 MG delayed release capsule, TAKE 1 CAPSULE BY MOUTH TWO TIMES A DAY , Disp: 60 capsule, Rfl: 5    levothyroxine (SYNTHROID) 50 MCG tablet, TAKE 1 TABLET BY MOUTH TWO TIMES A DAY , Disp: 60 tablet, Rfl: 5    clomiPRAMINE (ANAFRANIL) 50 MG capsule, TAKE 2 CAPSULES BY MOUTH AT BEDTIME , Disp: 60 capsule, Rfl: 5    QUEtiapine (SEROQUEL XR) 400 MG extended release tablet, TAKE 1 TABLET BY MOUTH NIGHTLY, Disp: 30 tablet, Rfl: 5    cyclobenzaprine (FLEXERIL) 10 MG tablet, TAKE 1 TABLET BY MOUTH EVERY EIGHT HOURS AS NEEDED FOR MUSCLE SPASM, Disp: 30 tablet, Rfl: 0    levothyroxine (SYNTHROID) 50 MCG tablet, TAKE 1 TABLET BY MOUTH TWO TIMES A DAY , Disp: 60 tablet, Rfl: 1    atorvastatin (LIPITOR) 40 MG tablet, TAKE 1 TABLET BY MOUTH ONE TIME A DAY, Disp: 30 tablet, Rfl: 5    albuterol sulfate HFA (PROVENTIL HFA) 108 (90 Base) MCG/ACT inhaler, Inhale 2 puffs into the lungs every 6 hours as needed for Wheezing, Disp: 1 Inhaler, Rfl: 0    SUMAtriptan (IMITREX) 50 MG tablet, TAKE 1 TABLET BY MOUTH ONE TIME A DAY AS NEEDED FOR MIGRAINE, Disp: 9 tablet, Rfl: 2    Digestive Enzymes (PAPAYA ENZYME) TABS, Take by mouth, Disp: , Rfl:     busPIRone (BUSPAR) 30 MG tablet, TAKE 1 AND 1/2 TABLETS BY MOUTH TWO TIMES A DAY, Disp: 90 tablet, Rfl: 5    ACCU-CHEK FASTCLIX LANCETS MISC, TEST ONCE DAILY, Disp: 100 each, Rfl: 4    linaclotide (LINZESS) 145 MCG capsule, Take 1 capsule by mouth daily as needed (constipation), Disp: 30 capsule, Rfl: 2    ACCU-CHEK FASTCLIX LANCETS MISC, TEST ONCE DAILY, Disp: 100 each, Rfl: 4    ACCU-CHEK FASTCLIX LANCETS MISC, TEST ONCE DAILY, Disp: 100 each, Rfl: 2    Blood Glucose Monitoring Suppl (ACCU-CHEK SOURAV PLUS) w/Device KIT, USE TO TEST BLOOD GLUCOSE , Disp: 1 kit, Rfl: 0    glucose blood VI test strips (ACCU-CHEK SOURAV PLUS) strip, 1 each by In Vitro route daily As needed. , Disp: 100 each, Rfl: 3    ipratropium-albuterol (DUONEB) 0.5-2.5 (3) MG/3ML SOLN nebulizer solution, Inhale 3 mLs into the lungs every 4 hours. , Disp: 360 mL, Rfl: 3    mometasone (NASONEX) 50 MCG/ACT nasal spray, 2 sprays by Nasal route daily. , Disp: 1 Inhaler, Rfl: 3    Cetirizine HCl (ZYRTEC ALLERGY) 10 MG CAPS, Take 1 tablet by mouth., Disp: 30 capsule, Rfl:       Review of Systems - History obtained from the patient  General ROS: negative  Psychological ROS: negative  Respiratory ROS: negative  Cardiovascular ROS: negative  Gastrointestinal ROS:negative  Genito-Urinary ROS: negative  Musculoskeletal ROS: negative   Skin ROS: negative    Physical Exam   Vitals Reviewed   Constitutional: Patient is oriented to person, place, and time. Patient appears well-developed and well-nourished. Patient is active and cooperative. Non-toxic appearance. No distress. Neck: Trachea normal and normal range of motion. No JVD present. Pulmonary/Chest: Effort normal. No accessory muscle usage or stridor. No apnea. No respiratory distress. Cardiovascular: Normal rate and no JVD.    Abdominal: Normal appearance. Patient exhibits no distension. Abdomen is soft, obese, non tender. Musculoskeletal: Normal range of motion. Patient exhibits no edema. Neurological: Patient is alert and oriented to person, place, and time. Patient has normal strength. GCS eye subscore is 4. GCS verbal subscore is 5. GCS motor subscore is 6. Skin: Skin is warm and dry. No abrasion and no rash noted. Patient is not diaphoretic. No cyanosis or erythema. Psychiatric: Patient has a normal mood and affect. Speech is normal and behavior is normal. Cognition and memory are normal.         A/P     Ayde Mathis is 34 y.o. female , now with Body mass index is 47.18 kg/m². s/p Sleeve gastrectomy, has lost 15 lbs since last visit, total of 71 lbs weight loss. The patient underwent dietary counseling with registered dietician. I have reviewed, discussed and agree with the dietary plan. Patient is trying hard to keep good dietary and behavior modifications. Patient is monitoring portion sizes, food choices and liquid calories. Patient is trying to exercise regularly. Patient pleased with the surgery outcomes. We discussed how her weight affects her overall health including:  Yolanda Rojas was seen today for bariatrics post op follow up. Diagnoses and all orders for this visit:    Morbid obesity with BMI of 45.0-49.9, adult ([address]    S/P laparoscopic sleeve gastrectomy       and importance of weight loss to alleviate those co morbid conditions. I encouraged the patient to continue exercise and keeping healthy eating habits. Also counseled the patient extensively on post surgery care. I spent 15 minutes face to face with patient with more than 50% of the time counseling and/or coordinating care for post-bariatric surgical care. RTC in 2 months  Diet and Exercise      Patient advised that its their responsibility to follow up for studies and/or labs ordered today.      Bradley Lmeus

## 2019-05-02 NOTE — Clinical Note
Down 71#. Would lose more if she increased physical activity. Tried to encourage her to do so. Thanks! Misbah Vidal

## 2019-05-02 NOTE — PROGRESS NOTES
Dietary Assessment Note    Vitals:   Vitals:    05/02/19 1050   Weight: 277 lb (125.6 kg)   Height: 5' 4.25\" (1.632 m)    Patient lost 15 lbs over 5 months. Total Weight Loss: 71#    Labs reviewed: labs reviewed, I note that LDL and TG are elevated. Protein intake: Patient not tracking     Fluid intake: <48 oz/day, around 4 cups    Multivitamin/mineral intake: centrum - how many/day: 2 daily    Calcium intake: 2 citracal petite    Other: None    Exercise: Yes. More active and improved movement but no formal exercise. Goal of 5k steps. Nutrition Assessment: 7 month post-op visit. Stress eater and feels it has been an issue lately. Struggling to get protein in and make herself eat something she doesn't feel like eating or that doesn't sound good. Breakfast: 1/2 bowl strawberries with a little sugar  And splenda, saltine cracker crust + SF coolwhip    Snack: 1/2 bowl strawberries with a little sugar  And splenda, saltine cracker crust + SF coolwhip OR cottage cheese (4 oz)    Lunch: premier protein shake OR protein bar OR 1-2 pieces ham    Dinner: pomento cheese with peppers OR pre-cooked grilled chicken strips with some cheese melted on it    Snack: mozarella cheese OR some SF chocolate with 1% milk     30-30-30: Avoiding eating and drinking together  Amount at a time: 1- 1 1/4 cups    Food Intolerances/issues: peanut butter    Client Concerns: behaviors with food    Goals:    Focus on protein and balance with foods  Focus on fluids, increasing to 8 cups daily  Recommended seeing behaviorist  Hnadouts: Food journaling and Pre-surg eating guide      Plan: Follow up at 9 months post op      Delia Sommers

## 2019-05-09 ENCOUNTER — PATIENT MESSAGE (OUTPATIENT)
Dept: FAMILY MEDICINE CLINIC | Age: 39
End: 2019-05-09

## 2019-05-09 DIAGNOSIS — F90.0 ATTENTION DEFICIT HYPERACTIVITY DISORDER (ADHD), PREDOMINANTLY INATTENTIVE TYPE: ICD-10-CM

## 2019-05-09 RX ORDER — CELECOXIB 100 MG/1
100 CAPSULE ORAL DAILY
Qty: 30 CAPSULE | Refills: 5 | Status: SHIPPED | OUTPATIENT
Start: 2019-05-09 | End: 2019-05-09 | Stop reason: SDUPTHER

## 2019-05-09 RX ORDER — CELECOXIB 100 MG/1
100 CAPSULE ORAL DAILY
Qty: 30 CAPSULE | Refills: 5 | Status: SHIPPED | OUTPATIENT
Start: 2019-05-09 | End: 2019-12-06

## 2019-05-09 RX ORDER — DEXTROAMPHETAMINE SACCHARATE, AMPHETAMINE ASPARTATE MONOHYDRATE, DEXTROAMPHETAMINE SULFATE AND AMPHETAMINE SULFATE 5; 5; 5; 5 MG/1; MG/1; MG/1; MG/1
20 CAPSULE, EXTENDED RELEASE ORAL EVERY MORNING
Qty: 30 CAPSULE | Refills: 0 | Status: SHIPPED | OUTPATIENT
Start: 2019-05-09 | End: 2019-06-05 | Stop reason: SDUPTHER

## 2019-05-09 NOTE — TELEPHONE ENCOUNTER
From: Rain Carvajal  To: Kevin Duncan MD  Sent: 5/9/2019 9:02 AM EDT  Subject: Prescription Question    I need a refill on my Adderall please!

## 2019-05-09 NOTE — TELEPHONE ENCOUNTER
From: Debra Moy  To: Guido Omer MD  Sent: 5/9/2019 12:07 PM EDT  Subject: Prescription Question    Dr. Mcbride [facility] office says I have to get the Celebrex Rx from you.      V

## 2019-05-14 RX ORDER — BUSPIRONE HYDROCHLORIDE 30 MG/1
TABLET ORAL
Qty: 45 TABLET | Refills: 4 | Status: SHIPPED | OUTPATIENT
Start: 2019-05-14 | End: 2019-05-16 | Stop reason: SDUPTHER

## 2019-05-15 ENCOUNTER — PATIENT MESSAGE (OUTPATIENT)
Dept: FAMILY MEDICINE CLINIC | Age: 39
End: 2019-05-15

## 2019-05-15 RX ORDER — DESVENLAFAXINE 50 MG/1
50 TABLET, EXTENDED RELEASE ORAL DAILY
Qty: 30 TABLET | Refills: 3 | Status: SHIPPED | OUTPATIENT
Start: 2019-05-15 | End: 2019-06-25

## 2019-05-16 RX ORDER — BUSPIRONE HYDROCHLORIDE 30 MG/1
TABLET ORAL
Qty: 45 TABLET | Refills: 4 | Status: SHIPPED | OUTPATIENT
Start: 2019-05-16 | End: 2019-07-22 | Stop reason: SDUPTHER

## 2019-05-16 RX ORDER — CLOMIPRAMINE HYDROCHLORIDE 25 MG/1
50 CAPSULE ORAL NIGHTLY
Qty: 60 CAPSULE | Refills: 0 | Status: SHIPPED | OUTPATIENT
Start: 2019-05-16 | End: 2019-12-06

## 2019-05-25 RX ORDER — CYCLOBENZAPRINE HCL 10 MG
TABLET ORAL
Qty: 30 TABLET | Refills: 0 | Status: SHIPPED | OUTPATIENT
Start: 2019-05-25 | End: 2019-06-17 | Stop reason: SDUPTHER

## 2019-06-21 ENCOUNTER — OFFICE VISIT (OUTPATIENT)
Dept: FAMILY MEDICINE CLINIC | Age: 39
End: 2019-06-21
Payer: COMMERCIAL

## 2019-06-21 VITALS
TEMPERATURE: 98.6 F | SYSTOLIC BLOOD PRESSURE: 120 MMHG | HEIGHT: 65 IN | BODY MASS INDEX: 46.48 KG/M2 | WEIGHT: 279 LBS | DIASTOLIC BLOOD PRESSURE: 80 MMHG | OXYGEN SATURATION: 97 % | HEART RATE: 96 BPM

## 2019-06-21 DIAGNOSIS — F31.81 BIPOLAR 2 DISORDER (HCC): ICD-10-CM

## 2019-06-21 DIAGNOSIS — F90.0 ATTENTION DEFICIT HYPERACTIVITY DISORDER (ADHD), PREDOMINANTLY INATTENTIVE TYPE: ICD-10-CM

## 2019-06-21 DIAGNOSIS — E66.01 MORBID OBESITY WITH BMI OF 50.0-59.9, ADULT (HCC): ICD-10-CM

## 2019-06-21 DIAGNOSIS — F41.9 ANXIETY: Primary | ICD-10-CM

## 2019-06-21 DIAGNOSIS — F51.04 PSYCHOPHYSIOLOGICAL INSOMNIA: ICD-10-CM

## 2019-06-21 PROCEDURE — 99215 OFFICE O/P EST HI 40 MIN: CPT | Performed by: FAMILY MEDICINE

## 2019-06-21 NOTE — PROGRESS NOTES
Subjective:      Patient ID: José Chávez is a 37 y.o. female. Blood pressure 120/80, pulse 96, temperature 98.6 °F (37 °C), temperature source Oral, height 5' 5\" (1.651 m), weight 279 lb (126.6 kg), last menstrual period 04/18/2013, SpO2 97 %, not currently breastfeeding. HPI her for check up. She has lost 80 lbs since her bariatric surgery. But she has stalled in her weight loss and is on an emotional roller coaster alternating between self loathing and self pride. Seh says she has been 'fat' her whole life, but since having children she just feels different. She still has food cravings that is worse when she is having PMS such as now. Still has continuous hunger. Though is successful in adhering to post-gastric sleeve diet for the most part. Weight is stable. Using protein shakes for lunch- 160 jenny  Total calorie goal for the day? Does not keep diary or count calories currently. She now has a new job that is part time. This has some added stress. Her 6year old daughter is high energy and stressful at times. 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 No prior pos TB test.    Current meds for anxiety: xanax 2mg TID. buspar 45 BID; pristiq 100 (this is new)  Is weaning off clomipramine- now 50mg and will go to 25 mg soon. She has slept OK with reducing clomip. Bioplar: lamictal 200 bid, seroquel 400mg nightly. ADHD: adderall xr 20 (very beneficial.  Does not affect appetite that she can tell)  Also helps with mood- if not fatigued her mood is more positive. No side effects. She still struggles with irritability, anger and worry. She no longer knows what to want for herself mood-wise. Has poor sense of self. Not seeing psychologist presently and her prior experience with psychologist has been poor.        Patient Active Problem List   Diagnosis    Allergy to eggs    Vitamin D deficiency    PMS (premenstrual syndrome)    Hypothyroidism    PCOD (polycystic ovarian disease)    Anxiety    ADD (attention deficit disorder)    Bipolar 2 disorder (HCC)    Family history of breast cancer (mother, age 36)   Heloise Akshat Asthma    Insomnia    Allergic rhinitis    Chronic constipation    Hyperlipidemia, unspecified    NAFLD (nonalcoholic fatty liver disease)    Chronic GERD    Migraine    Morbid obesity with BMI of 50.0-59.9, adult (HCC)    Gastroparesis    MELONIE (obstructive sleep apnea)    Diabetes mellitus type 2 in obese (HCC)    PUD (peptic ulcer disease)    Hiatal hernia with GERD      Body mass index is 46.43 kg/m². Wt Readings from Last 3 Encounters:   06/21/19 279 lb (126.6 kg)   05/02/19 277 lb (125.6 kg)   04/13/19 285 lb (129.3 kg)      BP Readings from Last 3 Encounters:   06/21/19 120/80   05/02/19 128/80   04/13/19 118/70      Current Outpatient Medications   Medication Sig Dispense Refill    ALPRAZolam (XANAX) 2 MG tablet TAKE 1 TABLET BY MOUTH THREE TIMES A DAY AS NEEDED FOR SLEEP AND ANXIETY 90 tablet 2    cyclobenzaprine (FLEXERIL) 10 MG tablet TAKE 1 TABLET BY MOUTH EVERY EIGHT HOURS AS NEEDED FOR MUSCLE SPASM 30 tablet 2    amphetamine-dextroamphetamine (ADDERALL XR) 20 MG extended release capsule Take 1 capsule by mouth every morning for 30 days.  30 capsule 0    busPIRone (BUSPAR) 30 MG tablet TAKE 1 AND 1/2 TABLETS BY MOUTH TWO TIMES A DAY 45 tablet 4    desvenlafaxine succinate (PRISTIQ) 50 MG TB24 extended release tablet Take 1 tablet by mouth daily (after using 25 mg for a month) 30 tablet 3    SUMAtriptan (IMITREX) 50 MG tablet TAKE 1 TABLET BY MOUTH ONE TIME A DAY AS NEEDED FOR MIGRAINE 9 tablet 3    celecoxib (CELEBREX) 100 MG capsule Take 1 capsule by mouth daily 30 capsule 5    Calcium Citrate-Vitamin D (CITRACAL PETITES/VITAMIN D PO) Take 2 tablets by mouth daily      Multiple Vitamins-Minerals (CENTRUM WOMEN PO) Take 2 tablets by mouth daily      lamoTRIgine (LAMICTAL) 200 MG tablet TAKE 1 TABLET BY MOUTH TWO TIMES A DAY  60 tablet 5    atorvastatin (LIPITOR) 40 MG tablet TAKE 1 TABLET BY MOUTH ONE TIME A DAY  30 tablet 5    omeprazole (PRILOSEC) 40 MG delayed release capsule TAKE 1 CAPSULE BY MOUTH TWO TIMES A DAY  60 capsule 5    levothyroxine (SYNTHROID) 50 MCG tablet TAKE 1 TABLET BY MOUTH TWO TIMES A DAY  60 tablet 5    clomiPRAMINE (ANAFRANIL) 50 MG capsule TAKE 2 CAPSULES BY MOUTH AT BEDTIME  60 capsule 5    QUEtiapine (SEROQUEL XR) 400 MG extended release tablet TAKE 1 TABLET BY MOUTH NIGHTLY 30 tablet 5    atorvastatin (LIPITOR) 40 MG tablet TAKE 1 TABLET BY MOUTH ONE TIME A DAY 30 tablet 5    albuterol sulfate HFA (PROVENTIL HFA) 108 (90 Base) MCG/ACT inhaler Inhale 2 puffs into the lungs every 6 hours as needed for Wheezing 1 Inhaler 0    Digestive Enzymes (PAPAYA ENZYME) TABS Take by mouth      ACCU-CHEK FASTCLIX LANCETS MISC TEST ONCE DAILY 100 each 4    linaclotide (LINZESS) 145 MCG capsule Take 1 capsule by mouth daily as needed (constipation) 30 capsule 2    Blood Glucose Monitoring Suppl (ACCU-CHEK SOURAV PLUS) w/Device KIT USE TO TEST BLOOD GLUCOSE  1 kit 0    glucose blood VI test strips (ACCU-CHEK SOURAV PLUS) strip 1 each by In Vitro route daily As needed. 100 each 3    ipratropium-albuterol (DUONEB) 0.5-2.5 (3) MG/3ML SOLN nebulizer solution Inhale 3 mLs into the lungs every 4 hours. 360 mL 3    mometasone (NASONEX) 50 MCG/ACT nasal spray 2 sprays by Nasal route daily. 1 Inhaler 3    Cetirizine HCl (ZYRTEC ALLERGY) 10 MG CAPS Take 1 tablet by mouth. 30 capsule     clomiPRAMINE (ANAFRANIL) 25 MG capsule Take 2 capsules by mouth nightly 60 capsule 0    metFORMIN (GLUCOPHAGE) 500 MG tablet TAKE 1 TABLET BY MOUTH THREE TIMES A DAY WITH MEALS 90 tablet 5     No current facility-administered medications for this visit.        Immunization History   Administered Date(s) Administered    Influenza 10/22/2011    Influenza Vaccine, unspecified formulation 09/11/2016    Influenza Virus Vaccine 09/04/2017, 09/07/2018    Influenza Whole 10/21/2015    Influenza, Intradermal, Preservative free 09/12/2012, 10/17/2013, 10/09/2014    Pneumococcal Polysaccharide ([id number]) 12/02/2015, 09/04/2017    Tdap (Boostrix, Adacel) 12/20/2005        Social History     Tobacco Use    Smoking status: Never Smoker    Smokeless tobacco: Never Used    Tobacco comment: congratulated on nonsmoking status. Substance Use Topics    Alcohol use: Yes     Comment: OCC    Drug use: No        Review of Systems As above     Objective:   Physical Exam   Constitutional: She is oriented to person, place, and time. She appears well-developed and well-nourished. Neck: Normal range of motion. Neck supple. Carotid bruit is not present. No thyromegaly present. Cardiovascular: Normal rate, regular rhythm, normal heart sounds and intact distal pulses. No murmur heard. Pulmonary/Chest: Effort normal and breath sounds normal. No respiratory distress. She has no wheezes. She has no rales. Musculoskeletal: Normal range of motion. She exhibits no edema. Neurological: She is alert and oriented to person, place, and time. Skin: Skin is warm and dry. Psychiatric: She has a normal mood and affect. Her behavior is normal. Judgment and thought content normal.   Nursing note and vitals reviewed. Assessment:      1. Anxiety  - continue with plan to transition from clomipramine to pristiq 100. She will stop clomip soon (currenty at 25 mg nightly) and remain at pristiq 100 (recently started this dose.) continue buspar and xanax at current doses for now. Both are targets for weaning later. While she still has a lot of irritability, and generalized fear, she feels more able to think through things. We discussed CBT a lot (with examples of how it works in various aspects of her life, like panic, food use, goal setting etc).   Urged to meet with professional counselor to continue to develop skill of conquering emotions with rational thoughts. - External Referral To Psychology    2. Bipolar 2 disorder ([facility]  - discussed how bipolar increases irritability and can lead to angry reactions (which can be disruptive to supportive relationships and lead to guilty feelings). Continue lamictal an d seroquel and urged to continue to use CBT to combat irritability as well. - External Referral To Psychology    3. Psychophysiological insomnia  - doing very well, despite weaning clomip! She will continue to use xanax and seroquel at night for sleep. 4. Morbid obesity with BMI of 50.0-59.9, adult ([facility]  - congratulated and supported her efforts at weight reduction, recognizing that even after bariatric surgery it can remain a challenge to control eating. Urged to increase exercise for the myriad benefits. 5. Attention deficit hyperactivity disorder (ADHD), predominantly inattentive type  - Stable; continue stimulant therapy. Plan:      Counseled patient for >50% appointment time on disease pathophysiology, management, answering questions; total time 50 minutes.          Monique Hernandez MD

## 2019-06-22 ENCOUNTER — PATIENT MESSAGE (OUTPATIENT)
Dept: FAMILY MEDICINE CLINIC | Age: 39
End: 2019-06-22

## 2019-06-24 RX ORDER — CYCLOBENZAPRINE HCL 10 MG
TABLET ORAL
Qty: 90 TABLET | Refills: 2 | Status: SHIPPED | OUTPATIENT
Start: 2019-06-24 | End: 2019-08-27

## 2019-06-25 RX ORDER — DESVENLAFAXINE 100 MG/1
100 TABLET, EXTENDED RELEASE ORAL DAILY
Qty: 30 TABLET | Refills: 5 | Status: SHIPPED | OUTPATIENT
Start: 2019-06-25 | End: 2020-01-21

## 2019-07-03 ENCOUNTER — OFFICE VISIT (OUTPATIENT)
Dept: FAMILY MEDICINE CLINIC | Age: 39
End: 2019-07-03
Payer: COMMERCIAL

## 2019-07-03 VITALS
HEART RATE: 72 BPM | HEIGHT: 65 IN | SYSTOLIC BLOOD PRESSURE: 110 MMHG | WEIGHT: 277 LBS | BODY MASS INDEX: 46.15 KG/M2 | OXYGEN SATURATION: 97 % | DIASTOLIC BLOOD PRESSURE: 72 MMHG

## 2019-07-03 DIAGNOSIS — R51.9 ACUTE INTRACTABLE HEADACHE, UNSPECIFIED HEADACHE TYPE: Primary | ICD-10-CM

## 2019-07-03 PROCEDURE — 99213 OFFICE O/P EST LOW 20 MIN: CPT | Performed by: FAMILY MEDICINE

## 2019-07-03 PROCEDURE — 96372 THER/PROPH/DIAG INJ SC/IM: CPT | Performed by: FAMILY MEDICINE

## 2019-07-03 RX ORDER — PROMETHAZINE HYDROCHLORIDE 25 MG/ML
25 INJECTION, SOLUTION INTRAMUSCULAR; INTRAVENOUS ONCE
Status: COMPLETED | OUTPATIENT
Start: 2019-07-03 | End: 2019-07-03

## 2019-07-03 RX ORDER — PROMETHAZINE HYDROCHLORIDE 50 MG/ML
25 INJECTION, SOLUTION INTRAMUSCULAR; INTRAVENOUS ONCE
Status: SHIPPED | OUTPATIENT
Start: 2019-07-03

## 2019-07-03 RX ORDER — DIPHENHYDRAMINE HYDROCHLORIDE 50 MG/ML
25 INJECTION INTRAMUSCULAR; INTRAVENOUS ONCE
Status: COMPLETED | OUTPATIENT
Start: 2019-07-03 | End: 2019-07-03

## 2019-07-03 RX ADMIN — PROMETHAZINE HYDROCHLORIDE 25 MG: 25 INJECTION, SOLUTION INTRAMUSCULAR; INTRAVENOUS at 16:06

## 2019-07-03 RX ADMIN — DIPHENHYDRAMINE HYDROCHLORIDE 25 MG: 50 INJECTION INTRAMUSCULAR; INTRAVENOUS at 16:02

## 2019-07-03 ASSESSMENT — ENCOUNTER SYMPTOMS
VOMITING: 0
NAUSEA: 1

## 2019-07-03 NOTE — PROGRESS NOTES
capsule by mouth every morning for 30 days. 30 capsule 0    busPIRone (BUSPAR) 30 MG tablet TAKE 1 AND 1/2 TABLETS BY MOUTH TWO TIMES A DAY 45 tablet 4    SUMAtriptan (IMITREX) 50 MG tablet TAKE 1 TABLET BY MOUTH ONE TIME A DAY AS NEEDED FOR MIGRAINE 9 tablet 3    celecoxib (CELEBREX) 100 MG capsule Take 1 capsule by mouth daily 30 capsule 5    Calcium Citrate-Vitamin D (CITRACAL PETITES/VITAMIN D PO) Take 2 tablets by mouth daily      Multiple Vitamins-Minerals (CENTRUM WOMEN PO) Take 2 tablets by mouth daily      metFORMIN (GLUCOPHAGE) 500 MG tablet TAKE 1 TABLET BY MOUTH THREE TIMES A DAY WITH MEALS 90 tablet 5    lamoTRIgine (LAMICTAL) 200 MG tablet TAKE 1 TABLET BY MOUTH TWO TIMES A DAY  60 tablet 5    atorvastatin (LIPITOR) 40 MG tablet TAKE 1 TABLET BY MOUTH ONE TIME A DAY  30 tablet 5    omeprazole (PRILOSEC) 40 MG delayed release capsule TAKE 1 CAPSULE BY MOUTH TWO TIMES A DAY  60 capsule 5    levothyroxine (SYNTHROID) 50 MCG tablet TAKE 1 TABLET BY MOUTH TWO TIMES A DAY  60 tablet 5    clomiPRAMINE (ANAFRANIL) 50 MG capsule TAKE 2 CAPSULES BY MOUTH AT BEDTIME  60 capsule 5    QUEtiapine (SEROQUEL XR) 400 MG extended release tablet TAKE 1 TABLET BY MOUTH NIGHTLY 30 tablet 5    atorvastatin (LIPITOR) 40 MG tablet TAKE 1 TABLET BY MOUTH ONE TIME A DAY 30 tablet 5    albuterol sulfate HFA (PROVENTIL HFA) 108 (90 Base) MCG/ACT inhaler Inhale 2 puffs into the lungs every 6 hours as needed for Wheezing 1 Inhaler 0    Digestive Enzymes (PAPAYA ENZYME) TABS Take by mouth      ACCU-CHEK FASTCLIX LANCETS MISC TEST ONCE DAILY 100 each 4    linaclotide (LINZESS) 145 MCG capsule Take 1 capsule by mouth daily as needed (constipation) 30 capsule 2    Blood Glucose Monitoring Suppl (ACCU-CHEK SOURAV PLUS) w/Device KIT USE TO TEST BLOOD GLUCOSE  1 kit 0    glucose blood VI test strips (ACCU-CHEK SOURAV PLUS) strip 1 each by In Vitro route daily As needed.  100 each 3    ipratropium-albuterol ([place]) unspecified headache type  This seems to be an intractable migraine  - no red flag neurological symptoms, normal neuro exam  - she has a family member to drive her home today after injections  - she will call if headache is not improving  -     promethazine (PHENERGAN) injection 25 mg  -     diphenhydrAMINE (BENADRYL) injection 25 mg

## 2019-07-04 ENCOUNTER — PATIENT MESSAGE (OUTPATIENT)
Dept: FAMILY MEDICINE CLINIC | Age: 39
End: 2019-07-04

## 2019-07-05 RX ORDER — BUTALBITAL, ACETAMINOPHEN AND CAFFEINE 50; 325; 40 MG/1; MG/1; MG/1
2 TABLET ORAL EVERY 12 HOURS PRN
Qty: 10 TABLET | Refills: 0 | Status: SHIPPED | OUTPATIENT
Start: 2019-07-05 | End: 2019-12-27

## 2019-07-07 ENCOUNTER — PATIENT MESSAGE (OUTPATIENT)
Dept: FAMILY MEDICINE CLINIC | Age: 39
End: 2019-07-07

## 2019-07-08 RX ORDER — PREDNISONE 1 MG/1
15 TABLET ORAL DAILY
Qty: 12 TABLET | Refills: 0 | Status: SHIPPED | OUTPATIENT
Start: 2019-07-08 | End: 2019-07-12

## 2019-07-15 DIAGNOSIS — R51.9 ACUTE INTRACTABLE HEADACHE, UNSPECIFIED HEADACHE TYPE: Primary | ICD-10-CM

## 2019-07-15 RX ORDER — SUMATRIPTAN 50 MG/1
TABLET, FILM COATED ORAL
Qty: 9 TABLET | Refills: 2 | Status: SHIPPED | OUTPATIENT
Start: 2019-07-15 | End: 2019-12-17 | Stop reason: SDUPTHER

## 2019-08-08 ENCOUNTER — TELEPHONE (OUTPATIENT)
Dept: BARIATRICS/WEIGHT MGMT | Age: 39
End: 2019-08-08

## 2019-08-27 ENCOUNTER — HOSPITAL ENCOUNTER (EMERGENCY)
Age: 39
Discharge: HOME | End: 2019-08-27
Payer: COMMERCIAL

## 2019-08-27 ENCOUNTER — APPOINTMENT (OUTPATIENT)
Dept: GENERAL RADIOLOGY | Age: 39
End: 2019-08-27
Payer: COMMERCIAL

## 2019-08-27 VITALS
DIASTOLIC BLOOD PRESSURE: 78 MMHG | TEMPERATURE: 97.7 F | SYSTOLIC BLOOD PRESSURE: 134 MMHG | RESPIRATION RATE: 16 BRPM | OXYGEN SATURATION: 95 % | HEART RATE: 84 BPM

## 2019-08-27 DIAGNOSIS — M79.602 LEFT ARM PAIN: ICD-10-CM
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
## 2019-08-27 PROCEDURE — 6370000000 HC RX 637 (ALT 250 FOR IP): Performed by: NURSE PRACTITIONER

## 2019-08-27 PROCEDURE — 73030 X-RAY EXAM OF SHOULDER: CPT

## 2019-08-27 PROCEDURE — 73110 X-RAY EXAM OF WRIST: CPT

## 2019-08-27 PROCEDURE — 99284 EMERGENCY DEPT VISIT MOD MDM: CPT

## 2019-08-27 PROCEDURE — 73130 X-RAY EXAM OF HAND: CPT

## 2019-08-27 PROCEDURE — 4500000024 HC ED LEVEL 4 PROCEDURE

## 2019-08-27 RX ORDER — CYCLOBENZAPRINE HCL 10 MG
10 TABLET ORAL 3 TIMES DAILY PRN
Qty: 21 TABLET | Refills: 0 | Status: SHIPPED | OUTPATIENT
Start: 2019-08-27 | End: 2019-09-03

## 2019-08-27 RX ORDER — OXYCODONE HYDROCHLORIDE AND ACETAMINOPHEN 5; 325 MG/1; MG/1
2 TABLET ORAL ONCE
Status: COMPLETED | OUTPATIENT
Start: 2019-08-27 | End: 2019-08-27

## 2019-08-27 RX ORDER — OXYCODONE HYDROCHLORIDE AND ACETAMINOPHEN 5; 325 MG/1; MG/1
1 TABLET ORAL EVERY 6 HOURS PRN
Qty: 15 TABLET | Refills: 0 | Status: SHIPPED | OUTPATIENT
Start: 2019-08-27 | End: 2019-08-30

## 2019-08-27 RX ADMIN — OXYCODONE HYDROCHLORIDE AND ACETAMINOPHEN 2 TABLET: 5; 325 TABLET ORAL at 15:48

## 2019-08-27 ASSESSMENT — PAIN SCALES - GENERAL
PAINLEVEL_OUTOF10: 5
PAINLEVEL_OUTOF10: 7
PAINLEVEL_OUTOF10: 10

## 2019-08-27 ASSESSMENT — PAIN DESCRIPTION - ORIENTATION: ORIENTATION: LEFT

## 2019-08-27 ASSESSMENT — ENCOUNTER SYMPTOMS
BACK PAIN: 0
COLOR CHANGE: 0
FACIAL SWELLING: 0

## 2019-08-27 ASSESSMENT — PAIN DESCRIPTION - LOCATION: LOCATION: GENERALIZED

## 2019-08-27 ASSESSMENT — PAIN DESCRIPTION - DESCRIPTORS: DESCRIPTORS: DISCOMFORT;ACHING

## 2019-08-27 ASSESSMENT — PAIN DESCRIPTION - PAIN TYPE: TYPE: ACUTE PAIN

## 2019-08-27 ASSESSMENT — PAIN DESCRIPTION - FREQUENCY: FREQUENCY: CONTINUOUS

## 2019-08-27 NOTE — ED PROVIDER NOTES
LABS:Labs Reviewed - No data to display     Remainder of labs reviewed and werenegative at this time or not returned at the time of this note. RADIOLOGY:   Non-plain film images such as CT, Ultrasound and MRI are read by the radiologist. LEILANI Delcid CNP have directly visualized the radiologic plain film image(s) with the below findings:        Interpretation per the Radiologist below, if available at the time of thisnote:    XR SHOULDER LEFT (MIN 2 VIEWS)   Final Result   No acute osseous abnormality. XR WRIST LEFT (MIN 3 VIEWS)   Final Result   No acute osseous abnormality. XR HAND LEFT (MIN 3 VIEWS)   Preliminary Result   1. No evidence of acute fracture. Follow-up examination recommended in 7-10   days if clinically indicated. 2. Widening of interspace between navicular and lunate bones. Finding could   be developmental versus changes of scapholunate ligament injury, age   indeterminate. Clinical correlation would be helpful. 3. Curvilinear radiopaque density along medial soft tissues of the left ring   finger at the level of the middle phalanx. Finding may lie in or on the   skin. Clinical correlation would be helpful. Xr Wrist Left (min 3 Views)    Result Date: 8/27/2019  EXAMINATION: 3 XRAY VIEWS OF THE LEFT WRIST 8/27/2019 3:50 pm COMPARISON: None. HISTORY: ORDERING SYSTEM PROVIDED HISTORY: Injury TECHNOLOGIST PROVIDED HISTORY: Reason for exam:->Injury Reason for Exam: /o falling due to slipping in mud that ran off a hill side. Pt slipped landing on left side Acuity: Acute Type of Exam: Initial FINDINGS: No acute fracture or subluxation. No focal osseous lesion. No radiopaque foreign object. No acute osseous abnormality.      Xr Hand Left (min 3 Views)    Result Date: 8/27/2019  EXAMINATION: THREE X-RAY VIEWS OF THE LEFT HAND, 8/27/2019 3:50 pm COMPARISON: None HISTORY: ORDERING SYSTEM PROVIDED HISTORY: Injury TECHNOLOGIST PROVIDED HISTORY: Reason for Exam:-> injury Reason for Exam: C/o falling due to slipping in mud that ran off a hill side. Patient slipped landing on left side. Acuity: Acute Type of Exam: Initial FINDINGS: Patient is suboptimally positioned on the lateral view related to lack of fanning of the fingers. Mild widening of interspace between navicular and lunate bones. Finding may be developmental versus changes of scapholunate injury, age indeterminate. Minimal ulna minus configuration of the wrist. No definite acute fracture or dislocation. On the AP view, there is curvilinear radiopaque density along medial soft tissues of the 5th finger at the level of the middle phalanx. Finding may represent radiopaque foreign body in or on the skin. 1. No evidence of acute fracture. Follow-up examination recommended in 7-10 days if clinically indicated. 2. Widening of interspace between navicular and lunate bones. Finding could be developmental versus changes of scapholunate ligament injury, age indeterminate. Clinical correlation would be helpful. 3. Curvilinear radiopaque density along medial soft tissues of the left ring finger at the level of the middle phalanx. Finding may lie in or on the skin. Clinical correlation would be helpful. Xr Shoulder Left (min 2 Views)    Result Date: 8/27/2019  EXAMINATION: THREE XRAY VIEWS OF THE LEFT SHOULDER 8/27/2019 3:50 pm COMPARISON: None. HISTORY: ORDERING SYSTEM PROVIDED HISTORY: fall TECHNOLOGIST PROVIDED HISTORY: Reason for exam:->fall Reason for Exam: /o falling due to slipping in mud that ran off a hill side. Pt slipped landing on left side Acuity: Acute Type of Exam: Initial FINDINGS: Degenerative changes at the acromioclavicular joint. No acute fracture or subluxation. The visualized lung fields are clear. No acute osseous abnormality.          MEDICAL DECISION MAKING / ED COURSE:      PROCEDURES:   Procedures    Thumb spica OCL splint was placed by the emergency department

## 2019-08-30 ENCOUNTER — OFFICE VISIT (OUTPATIENT)
Dept: ORTHOPEDIC SURGERY | Age: 39
End: 2019-08-30
Payer: COMMERCIAL

## 2019-08-30 VITALS — WEIGHT: 275 LBS | BODY MASS INDEX: 46.95 KG/M2 | HEIGHT: 64 IN | RESPIRATION RATE: 16 BRPM

## 2019-08-30 DIAGNOSIS — S63.502A WRIST SPRAIN, LEFT, INITIAL ENCOUNTER: Primary | ICD-10-CM

## 2019-08-30 PROCEDURE — 99243 OFF/OP CNSLTJ NEW/EST LOW 30: CPT | Performed by: ORTHOPAEDIC SURGERY

## 2019-08-30 PROCEDURE — 29075 APPL CST ELBW FNGR SHORT ARM: CPT | Performed by: ORTHOPAEDIC SURGERY

## 2019-08-30 RX ORDER — GABAPENTIN 300 MG/1
CAPSULE ORAL
Refills: 1 | COMMUNITY
Start: 2019-08-22 | End: 2020-09-23

## 2019-08-30 NOTE — PROGRESS NOTES
swelling. Muscular strength is clinically appropriate bilaterally. Maximal pain is elicited with palpation of the Radial, Ulnar and Dorsal, specifically at the Scaphoid, Lunate, Triquetrum and Radius. The base of the hand & wrist are severely tender to palpation. There is not clinical evidence of skeletal deformity or mal-rotation. Exam of the Left wrist(s) for stability is essentially impossible due to her limited ability to cooperate with examination due to her perception of pain             Radiographic Evaluation:  Radiographs are reviewed  today (3 views of the left wrist). They demonstrate no evidence of an acute fracture of the distal radius, ulna, or carpal bones (specifically the scaphoid appears normal). The [facility] joints & bases of the  Metacarpals do not show displacement or acute injury. There is no evidence of degenerative change. There is not evidence of other injury or bony fracture. There is suggestion of some widening of the S_L interval on several views. Impression:  Ms. Irina Knox has sustained recent left wrist soft tissue injury and presents requesting further treatment. Plan:  I have discussed with Ms. Irina Knox the various treatment options for treatment of left wrist injury. We discussed the options of conservative treatment with immobilization, rest, activity limitation, and the judicious use of OTC anti-inflamatory medications. she has elected to proceed conservativly, voicing an understanding of the other options available to her. I have explained the complications, limitations, expectations, alternatives, & risks of her chosen treatment. I have explained the likely healing time and the need for protection and limited mobilization.   We discussed the possibility of residual symptoms as may be related to conservative treatment of ligament injuries including the possiblities of: persistant deformity, persistant pain, limitation of motion, persistent

## 2019-09-11 ENCOUNTER — PATIENT MESSAGE (OUTPATIENT)
Dept: FAMILY MEDICINE CLINIC | Age: 39
End: 2019-09-11

## 2019-09-11 DIAGNOSIS — F90.0 ATTENTION DEFICIT HYPERACTIVITY DISORDER (ADHD), PREDOMINANTLY INATTENTIVE TYPE: ICD-10-CM

## 2019-09-11 DIAGNOSIS — F90.0 ATTENTION DEFICIT HYPERACTIVITY DISORDER (ADHD), PREDOMINANTLY INATTENTIVE TYPE: Primary | ICD-10-CM

## 2019-09-12 RX ORDER — DEXTROAMPHETAMINE SACCHARATE, AMPHETAMINE ASPARTATE MONOHYDRATE, DEXTROAMPHETAMINE SULFATE AND AMPHETAMINE SULFATE 6.25; 6.25; 6.25; 6.25 MG/1; MG/1; MG/1; MG/1
25 CAPSULE, EXTENDED RELEASE ORAL EVERY MORNING
Qty: 30 CAPSULE | Refills: 0 | Status: SHIPPED | OUTPATIENT
Start: 2019-09-12 | End: 2019-10-09 | Stop reason: SDUPTHER

## 2019-09-12 RX ORDER — DEXTROAMPHETAMINE SACCHARATE, AMPHETAMINE ASPARTATE MONOHYDRATE, DEXTROAMPHETAMINE SULFATE AND AMPHETAMINE SULFATE 5; 5; 5; 5 MG/1; MG/1; MG/1; MG/1
20 CAPSULE, EXTENDED RELEASE ORAL EVERY MORNING
Qty: 30 CAPSULE | Refills: 0 | OUTPATIENT
Start: 2019-09-12 | End: 2019-10-12

## 2019-09-19 ENCOUNTER — TELEPHONE (OUTPATIENT)
Dept: ORTHOPEDIC SURGERY | Age: 39
End: 2019-09-19

## 2019-09-19 NOTE — TELEPHONE ENCOUNTER
Pt would like someone to call her to see what she can do today for her pain until she comes tomorrow for her appt. Please call to advise.

## 2019-09-20 ENCOUNTER — OFFICE VISIT (OUTPATIENT)
Dept: ORTHOPEDIC SURGERY | Age: 39
End: 2019-09-20
Payer: COMMERCIAL

## 2019-09-20 VITALS
HEIGHT: 65 IN | SYSTOLIC BLOOD PRESSURE: 136 MMHG | WEIGHT: 272 LBS | DIASTOLIC BLOOD PRESSURE: 80 MMHG | HEART RATE: 94 BPM | BODY MASS INDEX: 45.32 KG/M2

## 2019-09-20 DIAGNOSIS — M17.12 PRIMARY OSTEOARTHRITIS OF LEFT KNEE: ICD-10-CM

## 2019-09-20 DIAGNOSIS — M25.562 ACUTE PAIN OF LEFT KNEE: Primary | ICD-10-CM

## 2019-09-20 PROCEDURE — 99212 OFFICE O/P EST SF 10 MIN: CPT | Performed by: ORTHOPAEDIC SURGERY

## 2019-09-20 PROCEDURE — 20610 DRAIN/INJ JOINT/BURSA W/O US: CPT | Performed by: ORTHOPAEDIC SURGERY

## 2019-09-20 RX ORDER — IBUPROFEN 800 MG/1
800 TABLET ORAL EVERY 6 HOURS PRN
COMMUNITY
End: 2020-09-23

## 2019-09-23 ENCOUNTER — OFFICE VISIT (OUTPATIENT)
Dept: ORTHOPEDIC SURGERY | Age: 39
End: 2019-09-23
Payer: COMMERCIAL

## 2019-09-23 VITALS
WEIGHT: 272 LBS | BODY MASS INDEX: 45.32 KG/M2 | SYSTOLIC BLOOD PRESSURE: 129 MMHG | HEIGHT: 65 IN | DIASTOLIC BLOOD PRESSURE: 80 MMHG

## 2019-09-23 DIAGNOSIS — S63.502A WRIST SPRAIN, LEFT, INITIAL ENCOUNTER: Primary | ICD-10-CM

## 2019-09-23 DIAGNOSIS — S63.502A SPRAIN OF LEFT WRIST, INITIAL ENCOUNTER: ICD-10-CM

## 2019-09-23 PROCEDURE — 99213 OFFICE O/P EST LOW 20 MIN: CPT | Performed by: PHYSICIAN ASSISTANT

## 2019-09-23 PROCEDURE — L3908 WHO COCK-UP NONMOLDE PRE OTS: HCPCS | Performed by: PHYSICIAN ASSISTANT

## 2019-09-23 NOTE — PATIENT INSTRUCTIONS
Hand Range of Motion Instructions      Dr. Sundeep Morales    1. Be cautions in resuming fulll activities and use of the hand for next 2 - 4 weeks. 2. Perform the following exercises VIGOROUSLY at least four times a day. Exercises should be performed in the seated position with elbow on tabletop or other firm surface. If you cannot make these motions on your own, you may use other hand to assist in making these motions. A. Fully straighten fingers until hand is flat. Fully bend fingers until hand is in a full fist.   B. Bend wrist forward and backward (grasp hand around knuckles with other hand to do so). C. Rotate forearm so that your palm faces towards your face. Rotate forearm so that your palm faces away from your face (grasp hand around wrist with other hand to do so). D. Fully straighten elbow. Fully bend elbow. 3. Continue light use of the hand progressing to more normal us as it feels comfortable to do so. 4. In 2 - 4 weeks you may discontinue using the brace (if you were using one) and resume normal use of the hand and wrist if you have regained full and painless motion and function. 5. If you are unable to achieve  full and painless motion and function over 4 weeks, please call the office at [phone] to schedule a follow-up appointment with Dr. Lisa Corrales. Thank you for choosing [facility] Physicians for your Hand and Upper Extremity needs. If we can be of any further assistance to you, please do not hesitate to contact us.     Office Phone Number:  [phone]  or  [phone]

## 2019-09-25 ENCOUNTER — OFFICE VISIT (OUTPATIENT)
Dept: BARIATRICS/WEIGHT MGMT | Age: 39
End: 2019-09-25
Payer: COMMERCIAL

## 2019-09-25 VITALS
HEART RATE: 82 BPM | SYSTOLIC BLOOD PRESSURE: 131 MMHG | BODY MASS INDEX: 45.93 KG/M2 | DIASTOLIC BLOOD PRESSURE: 85 MMHG | HEIGHT: 64 IN | WEIGHT: 269 LBS

## 2019-09-25 DIAGNOSIS — E66.01 MORBID OBESITY WITH BMI OF 45.0-49.9, ADULT (HCC): Primary | ICD-10-CM

## 2019-09-25 DIAGNOSIS — Z98.84 S/P LAPAROSCOPIC SLEEVE GASTRECTOMY: ICD-10-CM

## 2019-09-25 PROCEDURE — 99213 OFFICE O/P EST LOW 20 MIN: CPT | Performed by: SURGERY

## 2019-09-25 NOTE — PROGRESS NOTES
TONSILLECTOMY  1990    TUBAL LIGATION       Family History   Problem Relation Age of Onset    Diabetes Mother     Asthma Mother     Cancer Mother 36        breast    High Blood Pressure Mother     High Cholesterol Mother     Clotting Disorder Mother    [facility] Migraines Mother     Substance Abuse Mother     Mental Illness Mother     Other Mother         blood clots    Arthritis Mother     High Blood Pressure Father     Arthritis Father     Glaucoma Father     Migraines Brother     Seizures Brother     Mental Illness Brother         undiagnosed but tried to commit suicide    Diabetes Paternal Grandfather     Glaucoma Paternal Grandfather     Coronary Art Dis Paternal Grandfather     Arthritis Paternal Grandfather     Cancer Maternal Grandmother     High Blood Pressure Maternal Grandfather     Cancer Paternal Grandmother     Arthritis Paternal Grandmother      Social History     Tobacco Use    Smoking status: Never Smoker    Smokeless tobacco: Never Used    Tobacco comment: congratulated on nonsmoking status. Substance Use Topics    Alcohol use: Yes     Comment: OCC     I counseled the patient on the importance of not smoking and risks of ETOH. Allergies   Allergen Reactions    Dilaudid [Hydromorphone Hcl]      Migraines    Cipro Xr     Ciprofloxacin Hives     Nausea,diarrhea    Eggs [Egg White] Other (See Comments)     Diarrhea at times    Tape [Adhesive Tape]      Blistering  Can use paper tape     Vitals:    09/25/19 1511   BP: 131/85   Pulse: 82   Weight: 269 lb (122 kg)   Height: 5' 4.25\" (1.632 m)       Body mass index is 45.82 kg/m².       Current Outpatient Medications:     ibuprofen (ADVIL;MOTRIN) 800 MG tablet, Take 800 mg by mouth every 6 hours as needed for Pain Indications: PRn, Disp: , Rfl:     diclofenac sodium 1 % GEL, Apply 4 g topically 4 times daily, Disp: 2 Tube, Rfl: 3    amphetamine-dextroamphetamine (ADDERALL XR) 25 MG extended release capsule, Take 1 capsule by normal mood and affect. Speech is normal and behavior is normal. Cognition and memory are normal.         A/P     Sveta Tuttle is 37 y.o. female , now with Body mass index is 45.82 kg/m². s/p Sleeve gastrectomy, has lost 8 lbs since last visit, total of 79 lbs weight loss. The patient underwent dietary counseling with registered dietician. I have reviewed, discussed and agree with the dietary plan. Patient is trying hard to keep good dietary and behavior modifications. Patient is monitoring portion sizes, food choices and liquid calories. Patient is trying to exercise regularly. Patient pleased with the surgery outcomes. We discussed how her weight affects her overall health including:  Devika Herbert was seen today for bariatrics post op follow up. Diagnoses and all orders for this visit:    Morbid obesity with BMI of 45.0-49.9, adult ([address]    S/P laparoscopic sleeve gastrectomy       and importance of weight loss to alleviate those co morbid conditions. I encouraged the patient to continue exercise and keeping healthy eating habits. Also counseled the patient extensively on post surgery care. I spent 15 minutes face to face with patient with more than 50% of the time counseling and/or coordinating care for post-bariatric surgical care. RTC in 3 months  Diet and Exercise      Patient advised that its their responsibility to follow up for studies and/or labs ordered today.      Dorian Gentile

## 2019-09-25 NOTE — PATIENT INSTRUCTIONS
Patient received dietary handouts and education. Goals: No more than 1 cup at a sitting;   Measure food portions; Increase water intake to 48 fl oz;   Aim for 3 10 min walks a week;    No meal skipping

## 2019-10-02 ENCOUNTER — PATIENT MESSAGE (OUTPATIENT)
Dept: FAMILY MEDICINE CLINIC | Age: 39
End: 2019-10-02

## 2019-10-02 DIAGNOSIS — F90.0 ATTENTION DEFICIT HYPERACTIVITY DISORDER (ADHD), PREDOMINANTLY INATTENTIVE TYPE: ICD-10-CM

## 2019-10-09 RX ORDER — DEXTROAMPHETAMINE SACCHARATE, AMPHETAMINE ASPARTATE MONOHYDRATE, DEXTROAMPHETAMINE SULFATE AND AMPHETAMINE SULFATE 6.25; 6.25; 6.25; 6.25 MG/1; MG/1; MG/1; MG/1
25 CAPSULE, EXTENDED RELEASE ORAL EVERY MORNING
Qty: 30 CAPSULE | Refills: 0 | Status: SHIPPED | OUTPATIENT
Start: 2019-10-09 | End: 2019-11-07 | Stop reason: SDUPTHER

## 2019-10-21 ENCOUNTER — PATIENT MESSAGE (OUTPATIENT)
Dept: FAMILY MEDICINE CLINIC | Age: 39
End: 2019-10-21

## 2019-11-06 ENCOUNTER — PATIENT MESSAGE (OUTPATIENT)
Dept: FAMILY MEDICINE CLINIC | Age: 39
End: 2019-11-06

## 2019-11-06 DIAGNOSIS — F90.0 ATTENTION DEFICIT HYPERACTIVITY DISORDER (ADHD), PREDOMINANTLY INATTENTIVE TYPE: ICD-10-CM

## 2019-11-07 RX ORDER — DEXTROAMPHETAMINE SACCHARATE, AMPHETAMINE ASPARTATE MONOHYDRATE, DEXTROAMPHETAMINE SULFATE AND AMPHETAMINE SULFATE 6.25; 6.25; 6.25; 6.25 MG/1; MG/1; MG/1; MG/1
25 CAPSULE, EXTENDED RELEASE ORAL EVERY MORNING
Qty: 30 CAPSULE | Refills: 0 | Status: SHIPPED | OUTPATIENT
Start: 2019-11-07 | End: 2019-12-04 | Stop reason: SDUPTHER

## 2019-12-04 ENCOUNTER — PATIENT MESSAGE (OUTPATIENT)
Dept: FAMILY MEDICINE CLINIC | Age: 39
End: 2019-12-04

## 2019-12-04 DIAGNOSIS — F90.0 ATTENTION DEFICIT HYPERACTIVITY DISORDER (ADHD), PREDOMINANTLY INATTENTIVE TYPE: ICD-10-CM

## 2019-12-04 RX ORDER — DEXTROAMPHETAMINE SACCHARATE, AMPHETAMINE ASPARTATE MONOHYDRATE, DEXTROAMPHETAMINE SULFATE AND AMPHETAMINE SULFATE 6.25; 6.25; 6.25; 6.25 MG/1; MG/1; MG/1; MG/1
25 CAPSULE, EXTENDED RELEASE ORAL EVERY MORNING
Qty: 30 CAPSULE | Refills: 0 | Status: SHIPPED | OUTPATIENT
Start: 2019-12-04 | End: 2020-01-14 | Stop reason: SDUPTHER

## 2019-12-05 ENCOUNTER — OFFICE VISIT (OUTPATIENT)
Dept: BARIATRICS/WEIGHT MGMT | Age: 39
End: 2019-12-05
Payer: COMMERCIAL

## 2019-12-05 DIAGNOSIS — Z71.89 INDIVIDUAL COUNSELING ENCOUNTER: Primary | ICD-10-CM

## 2019-12-05 PROCEDURE — 96150 PR HEAL & BEHAV ASSESS,EA 15 MIN,INIT: CPT | Performed by: SOCIAL WORKER

## 2019-12-06 ENCOUNTER — OFFICE VISIT (OUTPATIENT)
Dept: ORTHOPEDIC SURGERY | Age: 39
End: 2019-12-06
Payer: COMMERCIAL

## 2019-12-06 VITALS
WEIGHT: 270 LBS | HEART RATE: 106 BPM | HEIGHT: 65 IN | SYSTOLIC BLOOD PRESSURE: 134 MMHG | BODY MASS INDEX: 44.98 KG/M2 | DIASTOLIC BLOOD PRESSURE: 89 MMHG

## 2019-12-06 DIAGNOSIS — M17.12 PRIMARY OSTEOARTHRITIS OF LEFT KNEE: Primary | ICD-10-CM

## 2019-12-06 PROCEDURE — 20610 DRAIN/INJ JOINT/BURSA W/O US: CPT | Performed by: ORTHOPAEDIC SURGERY

## 2019-12-06 RX ORDER — COVID-19 ANTIGEN TEST
KIT MISCELLANEOUS
COMMUNITY
End: 2019-12-27

## 2019-12-24 ENCOUNTER — TELEPHONE (OUTPATIENT)
Dept: ORTHOPEDIC SURGERY | Age: 39
End: 2019-12-24

## 2019-12-27 ENCOUNTER — OFFICE VISIT (OUTPATIENT)
Dept: FAMILY MEDICINE CLINIC | Age: 39
End: 2019-12-27
Payer: COMMERCIAL

## 2019-12-27 VITALS
RESPIRATION RATE: 12 BRPM | WEIGHT: 270 LBS | HEART RATE: 104 BPM | SYSTOLIC BLOOD PRESSURE: 126 MMHG | OXYGEN SATURATION: 98 % | BODY MASS INDEX: 44.93 KG/M2 | DIASTOLIC BLOOD PRESSURE: 84 MMHG

## 2019-12-27 DIAGNOSIS — F98.8 ATTENTION DEFICIT DISORDER (ADD) WITHOUT HYPERACTIVITY: ICD-10-CM

## 2019-12-27 DIAGNOSIS — F41.9 ANXIETY: Primary | ICD-10-CM

## 2019-12-27 DIAGNOSIS — F31.81 BIPOLAR 2 DISORDER (HCC): ICD-10-CM

## 2019-12-27 DIAGNOSIS — F51.04 PSYCHOPHYSIOLOGICAL INSOMNIA: ICD-10-CM

## 2019-12-27 PROCEDURE — 99215 OFFICE O/P EST HI 40 MIN: CPT | Performed by: FAMILY MEDICINE

## 2019-12-27 RX ORDER — RIZATRIPTAN BENZOATE 10 MG/1
TABLET ORAL
Refills: 3 | COMMUNITY
Start: 2019-11-13 | End: 2020-12-29

## 2019-12-27 RX ORDER — SUMATRIPTAN 3 MG/1
INJECTION, SOLUTION SUBCUTANEOUS
Refills: 2 | COMMUNITY
Start: 2019-11-12

## 2019-12-27 RX ORDER — ALPRAZOLAM 2 MG/1
TABLET ORAL
Refills: 2 | COMMUNITY
Start: 2019-12-01 | End: 2020-01-06

## 2020-01-20 ENCOUNTER — TELEPHONE (OUTPATIENT)
Dept: ORTHOPEDIC SURGERY | Age: 40
End: 2020-01-20

## 2020-01-21 RX ORDER — DESVENLAFAXINE 100 MG/1
TABLET, EXTENDED RELEASE ORAL
Qty: 30 TABLET | Refills: 4 | Status: SHIPPED | OUTPATIENT
Start: 2020-01-21 | End: 2020-06-09

## 2020-01-21 RX ORDER — BUSPIRONE HYDROCHLORIDE 30 MG/1
TABLET ORAL
Qty: 90 TABLET | Refills: 4 | Status: SHIPPED | OUTPATIENT
Start: 2020-01-21 | End: 2020-06-09

## 2020-02-12 ENCOUNTER — TELEPHONE (OUTPATIENT)
Dept: FAMILY MEDICINE CLINIC | Age: 40
End: 2020-02-12

## 2020-02-13 RX ORDER — DEXTROAMPHETAMINE SACCHARATE, AMPHETAMINE ASPARTATE MONOHYDRATE, DEXTROAMPHETAMINE SULFATE AND AMPHETAMINE SULFATE 6.25; 6.25; 6.25; 6.25 MG/1; MG/1; MG/1; MG/1
25 CAPSULE, EXTENDED RELEASE ORAL EVERY MORNING
Qty: 30 CAPSULE | Refills: 0 | Status: SHIPPED | OUTPATIENT
Start: 2020-02-13 | End: 2020-03-05 | Stop reason: SDUPTHER

## 2020-03-13 RX ORDER — CYCLOBENZAPRINE HCL 10 MG
TABLET ORAL
Qty: 90 TABLET | Refills: 2 | OUTPATIENT
Start: 2020-03-13

## 2020-03-13 RX ORDER — SUMATRIPTAN 50 MG/1
TABLET, FILM COATED ORAL
Qty: 9 TABLET | Refills: 2 | OUTPATIENT
Start: 2020-03-13

## 2020-03-27 ENCOUNTER — TELEMEDICINE (OUTPATIENT)
Dept: FAMILY MEDICINE CLINIC | Age: 40
End: 2020-03-27
Payer: COMMERCIAL

## 2020-03-27 PROCEDURE — 99214 OFFICE O/P EST MOD 30 MIN: CPT | Performed by: FAMILY MEDICINE

## 2020-03-30 ENCOUNTER — TELEMEDICINE (OUTPATIENT)
Dept: FAMILY MEDICINE CLINIC | Age: 40
End: 2020-03-30
Payer: COMMERCIAL

## 2020-03-30 PROCEDURE — 99213 OFFICE O/P EST LOW 20 MIN: CPT | Performed by: FAMILY MEDICINE

## 2020-03-30 RX ORDER — SULFAMETHOXAZOLE AND TRIMETHOPRIM 800; 160 MG/1; MG/1
1 TABLET ORAL 2 TIMES DAILY
Qty: 14 TABLET | Refills: 0 | Status: SHIPPED | OUTPATIENT
Start: 2020-03-30 | End: 2020-04-06

## 2020-03-30 NOTE — PROGRESS NOTES
capsule by mouth every morning for 30 days. 30 capsule 0    cyclobenzaprine (FLEXERIL) 10 MG tablet TAKE 1 TABLET BY MOUTH EVERY EIGHT HOURS AS NEEDED FOR MUSCLE SPASM 90 tablet 0    omeprazole (PRILOSEC) 40 MG delayed release capsule TAKE 1 CAPSULE BY MOUTH TWO TIMES A DAY  60 capsule 5    desvenlafaxine succinate (PRISTIQ) 100 MG TB24 extended release tablet TAKE 1 TABLET BY MOUTH ONE TIME A DAY AFTER USING 25 MG FOR A MONTH 30 tablet 4    busPIRone (BUSPAR) 30 MG tablet TAKE 1 AND 1/2 TABLETS BY MOUTH TWO TIMES A DAY  90 tablet 4    ALPRAZolam (XANAX) 2 MG tablet TAKE 1 TABLET BY MOUTH THREE TIMES A DAY AS NEEDED FOR ANXIETY AND SLEEP 90 tablet 2    rizatriptan (MAXALT) 10 MG tablet TAKE 1 TABLET BY MOUTH ONCE FOR 1 DOSE. MAY REPEAT AT 2 HOUR INTERVALS. DO NOT EXCEED 2 tabs IN 24 HOURS  3    ZEMBRACE SYMTOUCH 3 MG/0.5ML SOAJ INJECT 1 AUTOINJECTOR (0.5 ML) SUBCUTANEOUSLY. MAY REPEAT IN 1 HOUR IF PAIN RETURNS OR INCREASES IN SEVERITY. MAX 2 DOSES PER 24 HRS.   2    levothyroxine (SYNTHROID) 50 MCG tablet TAKE 1 TABLET BY MOUTH TWO TIMES A DAY  60 tablet 5    QUEtiapine (SEROQUEL XR) 200 MG extended release tablet TAKE 2 TABLETS BY MOUTH IN THE EVENING  60 tablet 5    lamoTRIgine (LAMICTAL) 200 MG tablet TAKE 1 TABLET BY MOUTH TWO TIMES A DAY  60 tablet 5    ibuprofen (ADVIL;MOTRIN) 800 MG tablet Take 800 mg by mouth every 6 hours as needed for Pain Indications: PRn      gabapentin (NEURONTIN) 300 MG capsule TAKE 1 CAPSULE BY MOUTH AT BEDTIME  1    Calcium Citrate-Vitamin D (CITRACAL PETITES/VITAMIN D PO) Take 2 tablets by mouth daily      Multiple Vitamins-Minerals (CENTRUM WOMEN PO) Take 2 tablets by mouth daily      atorvastatin (LIPITOR) 40 MG tablet TAKE 1 TABLET BY MOUTH ONE TIME A DAY  30 tablet 5    clomiPRAMINE (ANAFRANIL) 50 MG capsule TAKE 2 CAPSULES BY MOUTH AT BEDTIME  60 capsule 5    Digestive Enzymes (PAPAYA ENZYME) TABS Take by mouth      Cetirizine HCl (ZYRTEC ALLERGY) 10 MG CAPS MD

## 2020-03-31 RX ORDER — SUMATRIPTAN 50 MG/1
TABLET, FILM COATED ORAL
Qty: 9 TABLET | Refills: 0 | OUTPATIENT
Start: 2020-03-31

## 2020-04-01 ENCOUNTER — PATIENT MESSAGE (OUTPATIENT)
Dept: FAMILY MEDICINE CLINIC | Age: 40
End: 2020-04-01

## 2020-04-01 RX ORDER — FLUCONAZOLE 150 MG/1
150 TABLET ORAL ONCE
Qty: 2 TABLET | Refills: 0 | Status: SHIPPED | OUTPATIENT
Start: 2020-04-01 | End: 2020-10-19 | Stop reason: SDUPTHER

## 2020-04-04 RX ORDER — CYCLOBENZAPRINE HCL 10 MG
TABLET ORAL
Qty: 90 TABLET | Refills: 0 | Status: SHIPPED | OUTPATIENT
Start: 2020-04-04 | End: 2020-05-04

## 2020-04-08 RX ORDER — SUMATRIPTAN 50 MG/1
TABLET, FILM COATED ORAL
Qty: 9 TABLET | Refills: 3 | Status: SHIPPED | OUTPATIENT
Start: 2020-04-08 | End: 2020-06-22

## 2020-04-11 ENCOUNTER — PATIENT MESSAGE (OUTPATIENT)
Dept: FAMILY MEDICINE CLINIC | Age: 40
End: 2020-04-11

## 2020-04-13 RX ORDER — ATORVASTATIN CALCIUM 40 MG/1
TABLET, FILM COATED ORAL
Qty: 30 TABLET | Refills: 2 | Status: SHIPPED | OUTPATIENT
Start: 2020-04-13 | End: 2020-07-13

## 2020-04-13 RX ORDER — LEVOTHYROXINE SODIUM 0.05 MG/1
TABLET ORAL
Qty: 60 TABLET | Refills: 2 | Status: SHIPPED | OUTPATIENT
Start: 2020-04-13 | End: 2020-07-13

## 2020-04-13 RX ORDER — LAMOTRIGINE 200 MG/1
TABLET ORAL
Qty: 60 TABLET | Refills: 2 | Status: SHIPPED | OUTPATIENT
Start: 2020-04-13 | End: 2020-07-13

## 2020-04-13 RX ORDER — DEXTROAMPHETAMINE SACCHARATE, AMPHETAMINE ASPARTATE MONOHYDRATE, DEXTROAMPHETAMINE SULFATE AND AMPHETAMINE SULFATE 6.25; 6.25; 6.25; 6.25 MG/1; MG/1; MG/1; MG/1
25 CAPSULE, EXTENDED RELEASE ORAL EVERY MORNING
Qty: 30 CAPSULE | Refills: 0 | Status: SHIPPED | OUTPATIENT
Start: 2020-04-13 | End: 2020-05-11 | Stop reason: SDUPTHER

## 2020-04-13 RX ORDER — QUETIAPINE 200 MG/1
TABLET, FILM COATED, EXTENDED RELEASE ORAL
Qty: 60 TABLET | Refills: 2 | Status: SHIPPED | OUTPATIENT
Start: 2020-04-13 | End: 2020-07-13

## 2020-06-22 RX ORDER — SUMATRIPTAN 50 MG/1
TABLET, FILM COATED ORAL
Qty: 9 TABLET | Refills: 0 | Status: SHIPPED | OUTPATIENT
Start: 2020-06-22 | End: 2020-07-17

## 2020-07-17 RX ORDER — SUMATRIPTAN 50 MG/1
TABLET, FILM COATED ORAL
Qty: 9 TABLET | Refills: 0 | Status: SHIPPED | OUTPATIENT
Start: 2020-07-17 | End: 2020-08-10

## 2020-08-10 RX ORDER — CYCLOBENZAPRINE HCL 10 MG
TABLET ORAL
Qty: 90 TABLET | Refills: 0 | Status: SHIPPED | OUTPATIENT
Start: 2020-08-10 | End: 2020-09-14

## 2020-08-26 ENCOUNTER — HOSPITAL ENCOUNTER (EMERGENCY)
Age: 40
Discharge: HOME | End: 2020-08-26
Payer: COMMERCIAL

## 2020-08-26 VITALS
SYSTOLIC BLOOD PRESSURE: 121 MMHG | HEART RATE: 82 BPM | RESPIRATION RATE: 18 BRPM | DIASTOLIC BLOOD PRESSURE: 74 MMHG | TEMPERATURE: 98.4 F | OXYGEN SATURATION: 98 %

## 2020-08-26 PROCEDURE — 99282 EMERGENCY DEPT VISIT SF MDM: CPT

## 2020-08-26 PROCEDURE — 6370000000 HC RX 637 (ALT 250 FOR IP): Performed by: PHYSICIAN ASSISTANT

## 2020-08-26 PROCEDURE — 2500000003 HC RX 250 WO HCPCS: Performed by: PHYSICIAN ASSISTANT

## 2020-08-26 PROCEDURE — 6360000002 HC RX W HCPCS: Performed by: PHYSICIAN ASSISTANT

## 2020-08-26 PROCEDURE — 96375 TX/PRO/DX INJ NEW DRUG ADDON: CPT

## 2020-08-26 PROCEDURE — 2580000003 HC RX 258: Performed by: PHYSICIAN ASSISTANT

## 2020-08-26 PROCEDURE — 96374 THER/PROPH/DIAG INJ IV PUSH: CPT

## 2020-08-26 PROCEDURE — 12002 RPR S/N/AX/GEN/TRNK2.6-7.5CM: CPT

## 2020-08-26 RX ORDER — DIPHENHYDRAMINE HYDROCHLORIDE 50 MG/ML
12.5 INJECTION INTRAMUSCULAR; INTRAVENOUS ONCE
Status: COMPLETED | OUTPATIENT
Start: 2020-08-26 | End: 2020-08-26

## 2020-08-26 RX ORDER — 0.9 % SODIUM CHLORIDE 0.9 %
1000 INTRAVENOUS SOLUTION INTRAVENOUS ONCE
Status: COMPLETED | OUTPATIENT
Start: 2020-08-26 | End: 2020-08-26

## 2020-08-26 RX ORDER — KETOROLAC TROMETHAMINE 30 MG/ML
15 INJECTION, SOLUTION INTRAMUSCULAR; INTRAVENOUS ONCE
Status: COMPLETED | OUTPATIENT
Start: 2020-08-26 | End: 2020-08-26

## 2020-08-26 RX ORDER — LIDOCAINE HYDROCHLORIDE 10 MG/ML
5 INJECTION, SOLUTION INFILTRATION; PERINEURAL ONCE
Status: COMPLETED | OUTPATIENT
Start: 2020-08-26 | End: 2020-08-26

## 2020-08-26 RX ORDER — METOCLOPRAMIDE HYDROCHLORIDE 5 MG/ML
5 INJECTION INTRAMUSCULAR; INTRAVENOUS ONCE
Status: COMPLETED | OUTPATIENT
Start: 2020-08-26 | End: 2020-08-26

## 2020-08-26 RX ORDER — BACITRACIN, NEOMYCIN, POLYMYXIN B 400; 3.5; 5 [USP'U]/G; MG/G; [USP'U]/G
OINTMENT TOPICAL ONCE
Status: COMPLETED | OUTPATIENT
Start: 2020-08-26 | End: 2020-08-26

## 2020-08-26 RX ADMIN — KETOROLAC TROMETHAMINE 15 MG: 30 INJECTION, SOLUTION INTRAMUSCULAR at 22:41

## 2020-08-26 RX ADMIN — SODIUM CHLORIDE 1000 ML: 9 INJECTION, SOLUTION INTRAVENOUS at 22:40

## 2020-08-26 RX ADMIN — METOCLOPRAMIDE HYDROCHLORIDE 5 MG: 5 INJECTION INTRAMUSCULAR; INTRAVENOUS at 22:42

## 2020-08-26 RX ADMIN — DIPHENHYDRAMINE HYDROCHLORIDE 12.5 MG: 50 INJECTION, SOLUTION INTRAMUSCULAR; INTRAVENOUS at 22:42

## 2020-08-26 RX ADMIN — BACITRACIN ZINC, NEOMYCIN SULFATE, AND POLYMYXIN B SULFATE: 400; 3.5; 5 OINTMENT TOPICAL at 22:28

## 2020-08-26 RX ADMIN — LIDOCAINE HYDROCHLORIDE 5 ML: 10 INJECTION, SOLUTION INFILTRATION; PERINEURAL at 21:53

## 2020-08-26 ASSESSMENT — PAIN DESCRIPTION - DESCRIPTORS: DESCRIPTORS: ACHING

## 2020-08-26 ASSESSMENT — PAIN SCALES - GENERAL
PAINLEVEL_OUTOF10: 8

## 2020-08-26 ASSESSMENT — ENCOUNTER SYMPTOMS
NAUSEA: 1
VOMITING: 0
COLOR CHANGE: 0
PHOTOPHOBIA: 1

## 2020-08-26 ASSESSMENT — PAIN DESCRIPTION - LOCATION: LOCATION: LEG

## 2020-08-26 ASSESSMENT — PAIN DESCRIPTION - PAIN TYPE: TYPE: ACUTE PAIN

## 2020-08-26 ASSESSMENT — PAIN DESCRIPTION - ORIENTATION: ORIENTATION: LEFT

## 2020-08-27 NOTE — ED PROVIDER NOTES
[address]      Pt Name: Enedelia Franco  MRN: [id number]  [place] 1980  Date of evaluation: 8/26/2020  Provider: FLORIAN Amos    This patient was not seen and evaluated by the attending physician No att. providers found. CHIEF COMPLAINT       Chief Complaint   Patient presents with    Laceration     To L thigh from box knife    Migraine       CRITICAL CARE TIME   I performed a total Critical Care time of  15 minutes, excluding separately reportable procedures. There was a high probability of clinically significant/life threatening deterioration in the patient's condition which required my urgent intervention. Not limited to multiple reexaminations, discussions with attending physician and consultants. HISTORY OF PRESENT ILLNESS  (Location/Symptom, Timing/Onset, Context/Setting, Quality, Duration, Modifying Factors, Severity.)   Enedelia Franco is a 41 y.o. female who presents to the emergency department accompanied by her significant other. She is states that she was using a  when it excellently slipped and she sustained a sick centimeter laceration to her left thigh. Bleeding controlled. Is a diabetic. Tetanus is up-to-date. Secondary complaint of a migraine frontal headache feels exactly prior headaches has been going on since Monday. Took her Imitrex today. Follows with neurology. No numbness or weakness. Nursing Notes were reviewed and I agree. REVIEW OF SYSTEMS    (2-9 systems for level 4, 10 or more for level 5)     Review of Systems   Constitutional: Negative for fever. Eyes: Positive for photophobia. Negative for visual disturbance. Cardiovascular: Negative for chest pain. Gastrointestinal: Positive for nausea. Negative for vomiting. Musculoskeletal: Negative for gait problem and joint swelling. Skin: Positive for wound. Negative for color change.    Neurological: Positive for headaches. Negative for weakness and numbness. Psychiatric/Behavioral: Negative for agitation, behavioral problems and confusion. Except as noted above the remainder of the review of systems was reviewed and negative. PAST MEDICAL HISTORY         Diagnosis Date    Anxiety     Arthritis     knees, ankle, wrists    Asthma     Back pain     Body piercing     retained in nose/ears    Chronic constipation 2014    Depression 2011    Diabetes mellitus ([address]     Fatty liver     GERD (gastroesophageal reflux disease)     Hyperlipidemia     Hypothyroidism 2011    Migraine     Obesity     Other ovarian dysfunction (luteal est/progest deficiency) 2011    PCOS (polycystic ovarian syndrome)     Prolonged emergence from general anesthesia     Vitamin D deficiency 2011       SURGICAL HISTORY           Procedure Laterality Date    BREAST LUMPECTOMY      left (cyst)     SECTION      1    ENDOMETRIAL ABLATION      Dr Mora Oar Right 13    Dr Doris Suárez; REMOVAL POSTERIOR CALCANEAL HEEL SPUR RIGHT FOOT    HYSTERECTOMY      uterus    OTHER SURGICAL HISTORY  2018    ROBOTIC ASSISTED LAPAROSCOPIC PYLOROPLASTY, INTRA ABDOMINAL FLAP OMENTAL FLAP, INTRAOPERATIVE EGD WITH BIOPSY    PARTIAL HYSTERECTOMY  10/15/13    Dr Prakash Haley benign pathology.  ND LAP,STOMACH,OTHER,W/O TUBE N/A 10/9/2018    ROBOTIC ASSISTED LAPAROSCOPIC SLEEVE GASTRECTOMY, HIATAL HERNIA REPAIR performed by Toribio Richards DO at [address]       Previous Medications    ALPRAZOLAM (XANAX) 2 MG TABLET    TAKE 1 TABLET BY MOUTH THREE TIMES A DAY AS NEEDED FOR ANXIETY AND SLEEP    AMPHETAMINE-DEXTROAMPHETAMINE (ADDERALL XR) 25 MG EXTENDED RELEASE CAPSULE    Take 1 capsule by mouth every morning for 30 days.     ATORVASTATIN (LIPITOR) 40 MG TABLET    TAKE 1 TABLET BY MOUTH ONE TIME A DAY     BUSPIRONE (BUSPAR) 30 MG TABLET    TAKE 1 AND 1/2 TABLETS BY MOUTH TWO TIMES A DAY     CALCIUM CITRATE-VITAMIN D (CITRACAL PETITES/VITAMIN D PO)    Take 2 tablets by mouth daily    CETIRIZINE HCL (ZYRTEC ALLERGY) 10 MG CAPS    Take 1 tablet by mouth. CLOMIPRAMINE (ANAFRANIL) 50 MG CAPSULE    TAKE 2 CAPSULES BY MOUTH AT BEDTIME     CYCLOBENZAPRINE (FLEXERIL) 10 MG TABLET    TAKE 1 TABLET BY MOUTH EVERY EIGHT HOURS AS NEEDED FOR MUSCLE SPASM    DESVENLAFAXINE SUCCINATE (PRISTIQ) 100 MG TB24 EXTENDED RELEASE TABLET    TAKE 1 TABLET BY MOUTH ONE TIME A DAY AFTER USING 25 MG FOR A MONTH    DIGESTIVE ENZYMES (PAPAYA ENZYME) TABS    Take by mouth    GABAPENTIN (NEURONTIN) 300 MG CAPSULE    TAKE 1 CAPSULE BY MOUTH AT BEDTIME    IBUPROFEN (ADVIL;MOTRIN) 800 MG TABLET    Take 800 mg by mouth every 6 hours as needed for Pain Indications: PRn    LAMOTRIGINE (LAMICTAL) 200 MG TABLET    TAKE 1 TABLET BY MOUTH TWO TIMES A DAY     LEVOTHYROXINE (SYNTHROID) 50 MCG TABLET    TAKE 1 TABLET BY MOUTH TWO TIMES A DAY     MULTIPLE VITAMINS-MINERALS (CENTRUM WOMEN PO)    Take 2 tablets by mouth daily    OMEPRAZOLE (PRILOSEC) 40 MG DELAYED RELEASE CAPSULE    TAKE 1 CAPSULE BY MOUTH TWO TIMES A DAY     PROGESTERONE (PROMETRIUM) 200 MG CAPSULE    1 cap intravaginally each night as directed (peak plus 3-12)    QUETIAPINE (SEROQUEL XR) 200 MG EXTENDED RELEASE TABLET    TAKE 2 TABLETS BY MOUTH IN THE EVENING     RIZATRIPTAN (MAXALT) 10 MG TABLET    TAKE 1 TABLET BY MOUTH ONCE FOR 1 DOSE. MAY REPEAT AT 2 HOUR INTERVALS. DO NOT EXCEED 2 tabs IN 24 HOURS    SUMATRIPTAN (IMITREX) 50 MG TABLET    TAKE 1 TABLET BY MOUTH ONE TIME A DAY AS NEEDED FOR MIGRAINE     ZEMBRACE SYMTOUCH 3 MG/0.5ML SOAJ    INJECT 1 AUTOINJECTOR (0.5 ML) SUBCUTANEOUSLY. MAY REPEAT IN 1 HOUR IF PAIN RETURNS OR INCREASES IN SEVERITY. MAX 2 DOSES PER 24 HRS. ALLERGIES     Dilaudid [hydromorphone hcl];  Cipro xr; Ciprofloxacin; Eggs [egg white]; and Tape [adhesive tape]    FAMILY HISTORY Problem Relation Age of Onset    Diabetes Mother     Asthma Mother     Cancer Mother 36        breast    High Blood Pressure Mother     High Cholesterol Mother     Clotting Disorder Mother     Migraines Mother     Substance Abuse Mother     Mental Illness Mother     Other Mother         blood clots    Arthritis Mother     High Blood Pressure Father     Arthritis Father     Glaucoma Father     Migraines Brother     Seizures Brother     Mental Illness Brother         undiagnosed but tried to commit suicide    Diabetes Paternal Grandfather     Glaucoma Paternal Grandfather     Coronary Art Dis Paternal Grandfather     Arthritis Paternal Grandfather     Cancer Maternal Grandmother     High Blood Pressure Maternal Grandfather     Cancer Paternal Grandmother     Arthritis Paternal Grandmother      Family Status   Relation Name Status    Mother  (Not Specified)    Father  (Not Specified)    Brother  (Not Specified)    PGF  (Not Specified)    MGM  (Not Specified)    MGF  (Not Specified)    PGM  (Not Specified)        SOCIAL HISTORY      reports that she has never smoked. She has never used smokeless tobacco. She reports current alcohol use. She reports that she does not use drugs. PHYSICAL EXAM    (up to 7 for level 4, 8 or more for level 5)     ED Triage Vitals   BP Temp Temp Source Pulse Resp SpO2 Height Weight   08/26/20 2105 08/26/20 2109 08/26/20 2109 08/26/20 2109 08/26/20 2109 08/26/20 2109 -- --   121/74 98.4 °F (36.9 °C) Oral 82 18 98 %         Physical Exam  Vitals signs and nursing note reviewed. Constitutional:       Appearance: Normal appearance. HENT:      Head: Normocephalic and atraumatic. Eyes:      Pupils: Pupils are equal, round, and reactive to light. Neck:      Musculoskeletal: Normal range of motion. Cardiovascular:      Rate and Rhythm: Normal rate. Pulses: Normal pulses. Pulmonary:      Effort: Pulmonary effort is normal. No respiratory distress. Skin:     General: Skin is warm. Neurological:      General: No focal deficit present. Mental Status: She is alert and oriented to person, place, and time. Cranial Nerves: No cranial nerve deficit. Sensory: No sensory deficit. Motor: No weakness. Psychiatric:         Mood and Affect: Mood normal.         Behavior: Behavior normal.         DIAGNOSTIC RESULTS     NONE    LABS:  Labs Reviewed - No data to display    All other labs were within normal range or not returned as of this dictation. EMERGENCY DEPARTMENT COURSE and DIFFERENTIAL DIAGNOSIS/MDM:   Vitals:    Vitals:    08/26/20 2105 08/26/20 2109   BP: 121/74    Pulse:  82   Resp:  18   Temp:  98.4 °F (36.9 °C)   TempSrc:  Oral   SpO2:  98%     I discussed with Katie Hogue and/or family the exam results, diagnosis, care, prognosis, reasons to return and the importance of follow up. Patient and/or family is in full agreement with plan and all questions have been answered. Specific discharge instructions explained, including reasons to return to the emergency department. Katie Hogue is well appearing, non-toxic, and afebrile at the time of discharge. Patient has laceration to upper left thigh. No pulsatile bleeding. Tetanus up-to-date. 7 sutures placed with anesthesia achieved. Tolerated well. Has secondary complaint of a migraine. Nonfocal neurologic exam feels similar to prior. Given medications with improvement. Follow-up with neurology. I estimate there is LOW risk for SUBARACHNOID HEMORRHAGE, MENINGITIS, INTRACRANIAL HEMORRHAGE, ENCEPHALITIS, TEMPORAL ARTERITIS, PSEUDOTUMOR CEREBIR, ACUTE GLAUCOMA, SUBDURAL OR EPIDURAL HEMATOMA, OR STROKE, thus I consider the discharge disposition reasonable. CONSULTS:  None    PROCEDURES:  Lac Repair    Date/Time: 8/26/2020 11:11 PM  Performed by: FLORIAN Spencer  Authorized by:  FLORIAN Spencer     Consent:     Consent obtained:  Verbal    Consent given by:  Patient  Anesthesia (see MAR for exact dosages): Anesthesia method:  Local infiltration    Local anesthetic:  Lidocaine 1% w/o epi  Laceration details:     Location:  Leg    Leg location:  L upper leg    Length (cm):  6  Treatment:     Area cleansed with:  Harmonyur-Clens    Amount of cleaning:  Standard    Irrigation solution:  Sterile water  Skin repair:     Repair method:  Sutures    Suture size:  5-0    Suture material:  Nylon    Suture technique:  Simple interrupted    Number of sutures:  7  Post-procedure details:     Dressing:  Antibiotic ointment and bulky dressing    Patient tolerance of procedure: Tolerated well, no immediate complications          FINAL IMPRESSION      1. Laceration of left thigh, initial encounter    2.  Migraine without status migrainosus, not intractable, unspecified migraine type          DISPOSITION/PLAN   DISPOSITION Decision To Discharge 08/26/2020 11:00:02 PM      PATIENT REFERRED TO:  Rylee Roldan MD  [address] [place] [address]. [address] [place] [address]  [phone]    Call   For follow up      DISCHARGE MEDICATIONS:  New Prescriptions    No medications on file       (Please note that portions of this note were completed with a voice recognition program.  Efforts were made to edit the dictations but occasionally words are mis-transcribed.)    Kellie Tilley, [id number]  08/26/20 9471

## 2020-08-27 NOTE — ED NOTES
RN reviewed discharge instructions with pt. Pt denies having questions at this time. Pt is a&ox4. No IV upon discharge.       Miguel Caraballo RN  08/26/20 8998

## 2020-09-03 ENCOUNTER — HOSPITAL ENCOUNTER (OUTPATIENT)
Dept: WOMENS IMAGING | Age: 40
Discharge: HOME | End: 2020-09-03
Payer: COMMERCIAL

## 2020-09-03 PROCEDURE — 77063 BREAST TOMOSYNTHESIS BI: CPT

## 2020-09-23 ENCOUNTER — TELEMEDICINE (OUTPATIENT)
Dept: FAMILY MEDICINE CLINIC | Age: 40
End: 2020-09-23
Payer: COMMERCIAL

## 2020-09-23 PROCEDURE — 99215 OFFICE O/P EST HI 40 MIN: CPT | Performed by: FAMILY MEDICINE

## 2020-09-23 RX ORDER — GALCANEZUMAB 120 MG/ML
INJECTION, SOLUTION SUBCUTANEOUS
COMMUNITY
Start: 2020-09-03 | End: 2022-07-25

## 2020-09-23 RX ORDER — AMOXICILLIN 500 MG/1
1000 CAPSULE ORAL 2 TIMES DAILY
Qty: 40 CAPSULE | Refills: 0 | Status: SHIPPED | OUTPATIENT
Start: 2020-09-23 | End: 2020-10-03

## 2020-09-23 RX ORDER — DEXTROAMPHETAMINE SACCHARATE, AMPHETAMINE ASPARTATE MONOHYDRATE, DEXTROAMPHETAMINE SULFATE AND AMPHETAMINE SULFATE 7.5; 7.5; 7.5; 7.5 MG/1; MG/1; MG/1; MG/1
30 CAPSULE, EXTENDED RELEASE ORAL EVERY MORNING
Qty: 30 CAPSULE | Refills: 0 | Status: SHIPPED | OUTPATIENT
Start: 2020-09-23 | End: 2020-10-19 | Stop reason: SDUPTHER

## 2020-09-23 RX ORDER — ONABOTULINUMTOXINA 100 [USP'U]/1
100 INJECTION, POWDER, LYOPHILIZED, FOR SOLUTION INTRADERMAL; INTRAMUSCULAR ONCE
COMMUNITY

## 2020-09-23 NOTE — PROGRESS NOTES
2020    TELEHEALTH EVALUATION -- Audio/Visual (During XEKWT-33 public health emergency)    HPI:    Kalyani Brewer (:  1980) has requested an audio/video evaluation for the following concern(s):    Chief Complaint   Patient presents with    Follow-up     f/u anxiety      Mariluz Girard is feeling very depressed, stressed, severe migraines. Having trouble coping with life.  'it's all too much.'  Is not considering suicide. Her mood tends to be labile- sometime up, sometimes down. Usually down, though. And always worried- equal amounts of fear and irritability. She worries about martha coronavirus. Feels 'out of control'   Her house is a mess and she has no desire to clean it. Very disorganized. Constant achy body pain about neck, shoulder, upper back, feet. (accupuncture has helped this some, but migraines more)    Sleep: has been 'so good' currently. Has sleep study and her MELONIE was very mild even before having bariatric surgery. Current meds for mood: pristiq 100, buspar 45 BID, lamictal 200 BID, seroquel 400 for sleep, xanax 2 mg BID-TID, adderall 25 q am. Flexeril 10 mg TID for neck pain. She says she feels her best in late afternoons/early evenings: most productive, least anxiety, more energetic.  most days are like this. She tends to feel best in the afternoons. Takes adderall in the morning- does not seem to help much. Her brain does not 'work right' until the afternoons. Has not had manic symptoms that she is aware of currently. Continues to have PMS 'like crazy'  Even since her hysterectomy. Has never tried low dose naltrexone. She has hx PCOS. She stopped metformin since her bariatric surgery since she had significant weight loss. I have an a1c on order for her. Lab Results   Component Value Date    LABA1C 5.4 2019    LABA1C 6.2 2018    LABA1C 6.4 2018      Has sinus infection symptoms currently: sinus pain, congestion for 2 weeks. TAKE 1 TABLET BY MOUTH TWO TIMES A DAY  Yes Bruno Orourke MD   QUEtiapine (SEROQUEL XR) 200 MG extended release tablet TAKE 2 TABLETS BY MOUTH IN THE EVENING  Yes Bruno Orourke MD   cyclobenzaprine (FLEXERIL) 10 MG tablet TAKE 1 TABLET BY MOUTH EVERY EIGHT HOURS AS NEEDED FOR MUSCLE SPASM Yes Bruno Orourke MD   amphetamine-dextroamphetamine (ADDERALL XR) 25 MG extended release capsule Take 1 capsule by mouth every morning for 30 days. Yes Bruno Orourke MD   ALPRAZolam Meena Diaz) 2 MG tablet TAKE 1 TABLET BY MOUTH THREE TIMES A DAY AS NEEDED FOR ANXIETY AND SLEEP Yes Bruno Orourke MD   SUMAtriptan (IMITREX) 50 MG tablet TAKE 1 TABLET BY MOUTH ONE TIME A DAY AS NEEDED FOR MIGRAINE  Yes Bruno Orourke MD   omeprazole (PRILOSEC) 40 MG delayed release capsule TAKE 1 CAPSULE BY MOUTH TWO TIMES A DAY  Yes Bruno Orourke MD   rizatriptan (MAXALT) 10 MG tablet TAKE 1 TABLET BY MOUTH ONCE FOR 1 DOSE. MAY REPEAT AT 2 HOUR INTERVALS. DO NOT EXCEED 2 tabs IN 24 HOURS Yes Historical Provider, MD   Ginny Bee 3 MG/0.5ML SOAJ INJECT 1 AUTOINJECTOR (0.5 ML) SUBCUTANEOUSLY. MAY REPEAT IN 1 HOUR IF PAIN RETURNS OR INCREASES IN SEVERITY. MAX 2 DOSES PER 24 HRS.  Yes Historical Provider, MD   ibuprofen (ADVIL;MOTRIN) 800 MG tablet Take 800 mg by mouth every 6 hours as needed for Pain Indications: PRn Yes Historical Provider, MD   gabapentin (NEURONTIN) 300 MG capsule TAKE 1 CAPSULE BY MOUTH AT BEDTIME Yes Historical Provider, MD   Calcium Citrate-Vitamin D (CITRACAL PETITES/VITAMIN D PO) Take 2 tablets by mouth daily Yes Historical Provider, MD   Multiple Vitamins-Minerals (CENTRUM WOMEN PO) Take 2 tablets by mouth daily Yes Historical Provider, MD   clomiPRAMINE (ANAFRANIL) 50 MG capsule TAKE 2 CAPSULES BY MOUTH AT BEDTIME  Yes Bruno Orourke MD   Digestive Enzymes (PAPAYA ENZYME) TABS Take by mouth Yes Historical Provider, MD   Cetirizine HCl (ZYRTEC ALLERGY) 10 MG CAPS Take 1 tablet by mouth. Yes Afshan Nicholson MD   progesterone (PROMETRIUM) 200 MG capsule 1 cap intravaginally each night as directed (peak plus 3-12)  Patient not taking: Reported on 2020  Afshan Nicholson MD   Prenatal Multivit-Min-Fe-FA (P D TAWANNA VITAMINS/FOLIC ACID) TABS Take 1 tablet by mouth daily. Afshan Nicholson MD       Social History     Tobacco Use    Smoking status: Never Smoker    Smokeless tobacco: Never Used    Tobacco comment: congratulated on nonsmoking status. Substance Use Topics    Alcohol use: Yes     Comment: [id number]    Drug use: No            PHYSICAL EXAMINATION:  Physical Exam  Constitutional:       General: She is not in acute distress. Appearance: Normal appearance. She is not ill-appearing. Pulmonary:      Effort: Pulmonary effort is normal.   Skin:     General: Skin is warm. Neurological:      General: No focal deficit present. Mental Status: She is alert and oriented to person, place, and time. Mental status is at baseline. Psychiatric:         Mood and Affect: Mood normal.         Behavior: Behavior normal.         Thought Content: Thought content normal.         Judgment: Judgment normal.          ASSESSMENT/PLAN:    1. Bipolar 2 disorder ([address]  - currently not in [place]. Is depressed. Advised to continue lamictal, pristiq, seroquel. 2. Attention deficit disorder (ADD) without hyperactivity  - has had under-whelming symptom relief from current dose of adderall xr, it seems to have lessened in effect over time. Plan to increase the dose to 30 mg.    3. Anxiety  - still running high. There is a confusing interplay of fatigue, disorganized thought, overwhelmed, worry, which can be difficult to separate. Is on high dose xanax and I worry that withdrawal or fluctuating levels can be disruptive to mood. She normally takes one xanax in the morning 8 am and one at bedtime.       I will see if DR Manjit Joseph will review her meds and give feedback. Not in counseling - can do this perhaps through [facility]. She says she struggles with self loathing- body image struggles. We talked through this for a while. 4. Acquired hypothyroidism  - has order for TSH for monitoring. 5. PCOD (polycystic ovarian disease)  - hold off on meds for this since she already has polypharmacy. Monitor a1c levels. 6. PMS (premenstrual syndrome)  - consider low dose naltrexone. Referred to Dr Shoaib velasquez to familiarize herself. 7. Psychophysiological insomnia  - stable on nightly seroquel. Consider immediate release or reducing dose due to AM grogginess. 8. Chronic pain syndrome- neck, shoulders  Stop flexeril- on too many meds and this has anticholinergic properties. Advised to use TOPICAL meds and accupuncture. Ordered voltaren gel. 9. Sinusitis, unspecified chronicity, unspecified location  - rx amoxicillin for probable bacterial infection, based on pain    10. Chronic migraine without aura with status migrainosus, not intractable  - she is using ibu, mucinex and benadryl regularly for sinus pressure. I asked her to not use Ibu and benadryl for the mext month. OK to use zyrtec. Return in about 3 months (around 12/23/2020) for follow up diabetes, in office visit. Tanner Burrows is a 41 y.o. female being evaluated by a Virtual Visit (video visit) encounter to address concerns as mentioned above. A caregiver was present when appropriate. Due to this being a TeleHealth encounter (During [facility]- public health emergency), evaluation of the following organ systems was limited: Vitals/Constitutional/EENT/Resp/CV/GI//MS/Neuro/Skin/Heme-Lymph-Imm.   Pursuant to the emergency declaration under the [facility], 1135 waiver authority and the [employer] Act, this Virtual Visit was conducted with patient's (and/or legal guardian's) consent, to reduce the patient's risk of exposure to COVID-19 and provide necessary medical care. The patient (and/or legal guardian) has also been advised to contact this office for worsening conditions or problems, and seek emergency medical treatment and/or call 911 if deemed necessary. Patient identification was verified at the start of the visit: Yes    Total time spent on this encounter: 45 min    Services were provided through a video synchronous discussion virtually to substitute for in-person clinic visit. Patient and provider were located at their individual homes. --Mesha Pablo MD on 9/23/2020 at 12:06 PM    An electronic signature was used to authenticate this note.

## 2020-10-05 RX ORDER — SUMATRIPTAN 50 MG/1
TABLET, FILM COATED ORAL
Qty: 9 TABLET | Refills: 0 | Status: SHIPPED | OUTPATIENT
Start: 2020-10-05 | End: 2020-10-06

## 2020-10-06 RX ORDER — SUMATRIPTAN 50 MG/1
TABLET, FILM COATED ORAL
Qty: 9 TABLET | Refills: 0 | Status: SHIPPED | OUTPATIENT
Start: 2020-10-06 | End: 2020-11-02

## 2020-10-08 ENCOUNTER — TELEMEDICINE (OUTPATIENT)
Dept: BARIATRICS/WEIGHT MGMT | Age: 40
End: 2020-10-08
Payer: COMMERCIAL

## 2020-10-08 ENCOUNTER — TELEPHONE (OUTPATIENT)
Dept: ORTHOPEDIC SURGERY | Age: 40
End: 2020-10-08

## 2020-10-08 PROCEDURE — 96156 HLTH BHV ASSMT/REASSESSMENT: CPT | Performed by: SOCIAL WORKER

## 2020-10-08 NOTE — PROGRESS NOTES
this call serves to triage the patient into an appointment for the relevant concern      TIARRA Glass

## 2020-10-08 NOTE — TELEPHONE ENCOUNTER
Patient called requesting gel injections, they were approved back in January and ever can in to have the injection. Not sure if another pre cert needs to be done before she can come in to have the injections.  # [phone]

## 2020-10-12 RX ORDER — CYCLOBENZAPRINE HCL 10 MG
TABLET ORAL
Qty: 90 TABLET | Refills: 0 | Status: SHIPPED | OUTPATIENT
Start: 2020-10-12 | End: 2020-11-16

## 2020-10-15 ENCOUNTER — OFFICE VISIT (OUTPATIENT)
Dept: ORTHOPEDIC SURGERY | Age: 40
End: 2020-10-15

## 2020-10-15 VITALS — HEIGHT: 65 IN | WEIGHT: 277 LBS | BODY MASS INDEX: 46.15 KG/M2 | TEMPERATURE: 97.5 F

## 2020-10-15 PROCEDURE — 20610 DRAIN/INJ JOINT/BURSA W/O US: CPT | Performed by: ORTHOPAEDIC SURGERY

## 2020-10-15 PROCEDURE — 99213 OFFICE O/P EST LOW 20 MIN: CPT | Performed by: ORTHOPAEDIC SURGERY

## 2020-10-15 RX ORDER — LAMOTRIGINE 200 MG/1
TABLET ORAL
Qty: 60 TABLET | Refills: 1 | Status: SHIPPED | OUTPATIENT
Start: 2020-10-15 | End: 2020-12-14

## 2020-10-15 RX ORDER — QUETIAPINE 200 MG/1
TABLET, FILM COATED, EXTENDED RELEASE ORAL
Qty: 60 TABLET | Refills: 1 | Status: SHIPPED | OUTPATIENT
Start: 2020-10-15 | End: 2020-12-14

## 2020-10-15 RX ORDER — BUSPIRONE HYDROCHLORIDE 30 MG/1
TABLET ORAL
Qty: 90 TABLET | Refills: 1 | Status: SHIPPED | OUTPATIENT
Start: 2020-10-15 | End: 2020-12-14

## 2020-10-15 RX ORDER — DESVENLAFAXINE 100 MG/1
TABLET, EXTENDED RELEASE ORAL
Qty: 30 TABLET | Refills: 1 | Status: SHIPPED | OUTPATIENT
Start: 2020-10-15 | End: 2020-12-02

## 2020-10-15 RX ORDER — ATORVASTATIN CALCIUM 40 MG/1
TABLET, FILM COATED ORAL
Qty: 30 TABLET | Refills: 1 | Status: SHIPPED | OUTPATIENT
Start: 2020-10-15 | End: 2020-12-14

## 2020-10-15 RX ORDER — CYCLOBENZAPRINE HCL 10 MG
TABLET ORAL
Qty: 90 TABLET | Refills: 0 | OUTPATIENT
Start: 2020-10-15

## 2020-10-15 RX ORDER — LEVOTHYROXINE SODIUM 0.05 MG/1
TABLET ORAL
Qty: 60 TABLET | Refills: 1 | Status: SHIPPED | OUTPATIENT
Start: 2020-10-15 | End: 2020-12-29 | Stop reason: SDUPTHER

## 2020-10-15 RX ORDER — BUTALBITAL, ACETAMINOPHEN AND CAFFEINE 300; 40; 50 MG/1; MG/1; MG/1
CAPSULE ORAL
COMMUNITY
Start: 2020-09-10 | End: 2021-02-03

## 2020-10-15 RX ORDER — GABAPENTIN 100 MG/1
100 CAPSULE ORAL 2 TIMES DAILY PRN
COMMUNITY
End: 2021-08-10 | Stop reason: ALTCHOICE

## 2020-10-15 NOTE — PROGRESS NOTES
ORTHOPAEDIC PROGRESS NOTE    Chief Complaint   Patient presents with    Follow-up     Left knee       HPI   10/15/2020  Returns for left knee  Left knee has been hurting more recently  Worse with stairs  Pain is mainly anteromedial  Pain is rated 7/10      12/6/2019  Returns for left knee  Steroid injection in September helped until 2 weeks ago  Pain now 9/10  No new injury  Pain remains mainly anterolateral/patellofemoral      9/20/2019  Returns for left knee  Since I last saw her, she underwent bariatric surgery with Dr Jennifer Riedel  Has lost ~70 lbs, doing well  She reports with the weight loss, her knees have felt better  She was doing boot camp, and noticed increased medial sided left knee pain earlier this week  No injury per se, she thinks from overuse  Has tried some taping, which she thinks has helped      12/29/2016  synvisc in left knee last week worked well  Here for right knee  Pain is diffuse, but mainly anterior and lateral  Pain with activity  Denies N/T    Vitals:    10/15/20 1429   Temp: 97.5 °F (36.4 °C)   TempSrc: Infrared   Weight: 277 lb (125.6 kg)   Height: 5' 5\" (1.651 m)       Physical Exam  Body mass index is 46.1 kg/m². Left knee:   Skin clear   +patellar grind   Mild to moderate TTP lateral joint line   No TTP medially    Imaging:  Left knee 4 views performed today in clinic   - Overall alignment is neutral.    The medial compartment is mildly narrowed with large osteophytes seen. The lateral compartment is moderately narrowed with large osteophytes seen. The anterior compartment is mildly narrowed with large osteophytes seen. The patella is well-centered within the trochlear groove. There are no loose bodies appreciated. There is evidence of tibiofemoral subluxation consistent with her arthritis. Assessment & Plan:  41 y.o. female following up for   Diagnosis Orders   1.  Primary osteoarthritis of left knee  XR KNEE LEFT (MIN 4 VIEWS)    [place] ARTHROCENTESIS ASPIR&/INJ MAJOR JT/BURSA W/O

## 2020-10-15 NOTE — TELEPHONE ENCOUNTER
Patient has labs that were ordered in March that have not been completed. I LM for patient that these need to be done.

## 2020-10-28 ENCOUNTER — TELEMEDICINE (OUTPATIENT)
Dept: BARIATRICS/WEIGHT MGMT | Age: 40
End: 2020-10-28
Payer: COMMERCIAL

## 2020-10-28 PROCEDURE — 96158 HLTH BHV IVNTJ INDIV 1ST 30: CPT | Performed by: SOCIAL WORKER

## 2020-10-28 NOTE — PROGRESS NOTES
Nelda Serrano is a 41 y.o. female evaluated via video virtual visit on 10/28/2020. Nelda Serrano presents today with diagnosis of individual counseling encounter related to behavioral health concerns. Patient presented as open and honest throughout virtual appointment. Consent:  She and/or health care decision maker is aware that that she may receive a bill for this telephone service, depending on her insurance coverage, and has provided verbal consent to proceed: Yes      Patient's Current Goals:  Goals      Make Healther Lifestyle choices      Behavioral: take steps towards self-acceptance utilizing positive self talk in the mirror 1x a week focusing on 1 body part. What has that body part DONE for you that you appreciate vs what does it look like? Nutrition: Instead of forcing self to adhere to a diet/routine she does not like/cannot stick to, accommodate current schedule to incorporate healthy choices. Try to track food consistently so she is aware of what she is eating and ingesting daily. Activity: patient states that she wants to incorporate her daughter to exercise with her. Suggested trying pilates or yoga at home to increase flexibility and activity levels          Manage Stress, Depression or Anxiety      Continue monitoring mental health symptoms and discuss with PCP if symptoms worsen               Updates since last visit:   Struggling with heavy depression currently   Has scheduled a f/u with Dr Dorie Saunders    Patient concerns or questions  Stated several times her depression is currently unmanaged, but doing her best.   Patient unable to exercise currently due to depression     Behaviorist overview:   Overall patient appears to be struggling.  Encouraged patient to follow up more often/as often as needed       I affirm this is a Patient Initiated Episode with an Established Patient who has not had a related appointment within my department in the past 7 days or scheduled within the next 24 hours.     Total Time: minutes: 21-30 minutes    Note: not billable if this call serves to triage the patient into an appointment for the relevant concern      TIARRA Walter

## 2020-11-10 ENCOUNTER — TELEPHONE (OUTPATIENT)
Dept: ORTHOPEDIC SURGERY | Age: 40
End: 2020-11-10

## 2020-11-10 NOTE — TELEPHONE ENCOUNTER
11/10/2020  230 \[facility]\""   LEFT  KNEE. APPROVED  SPECIALTY PHARMACY COVERAGE. APPROVED #  [id number]. DATES:  10/11/2020 -12/10/2020. PER MELY @ [address] [place] [address] DEPT. FAX COMING. VERBAL SCRIPT GIVEN TO [address] [place] [address], [address] @ [facility] [address]. THEY WILL RUN TEST CLAIM AND CONTACT PATIENT FOR CONSENT TO SHIP AND COPAYMENT THEN CALL US TO SCHEDULE DELIVERY TO THE WEST OFFICE.  ARY  .

## 2020-11-13 ENCOUNTER — PATIENT MESSAGE (OUTPATIENT)
Dept: FAMILY MEDICINE CLINIC | Age: 40
End: 2020-11-13

## 2020-11-13 NOTE — TELEPHONE ENCOUNTER
From: Vinny Roldan  To: Koko Miller MD  Sent: 11/13/2020 12:39 PM EST  Subject: Prescription Question    Hello! I need an Rx for Linzess please.

## 2020-11-16 RX ORDER — CYCLOBENZAPRINE HCL 10 MG
TABLET ORAL
Qty: 90 TABLET | Refills: 0 | Status: SHIPPED | OUTPATIENT
Start: 2020-11-16 | End: 2020-12-14

## 2020-11-17 ENCOUNTER — TELEPHONE (OUTPATIENT)
Dept: ORTHOPEDIC SURGERY | Age: 40
End: 2020-11-17

## 2020-11-17 NOTE — TELEPHONE ENCOUNTER
General Question     Subject: Injections, states the Monovisc she has a copay of 300 dollars. Wants to know if she can do the generic Hyaluronate sodium.    Patient and /or Facility Request: Irlanda Vital Number: [phone]

## 2020-11-17 NOTE — TELEPHONE ENCOUNTER
Colten Rodriguez, Patient does not wish to pay the $300 co-pay for the Monovisc. Do you happen to know if Euflexxa or Orthovisc will have the same co-pay?

## 2020-11-17 NOTE — TELEPHONE ENCOUNTER
11/17/2020. PER KOBE LANIER @ [facility], DRUG IS AVAILABLE TO SCHEDULE SHIPPING. THEY HAVE BEEN TRYING TO REACH PATIENT FOR CONSENT TO SHIP TO THE [place] OFFICE. PATIENT CAN CALL THEM @ [phone].  AP

## 2020-11-17 NOTE — TELEPHONE ENCOUNTER
11/17/2020. PER MARIANNA @ [facility],     ORTHOVISC COPAY FOR 30 DAY SUPPLY $248.38 = QTY 1  ORTHOVISC COPAY FOR 90 DAY SUPPLY $300.00 = QTY 3    EUFLEXXA COPAY FOR 30 DAY SUPPLY $300.00 = QTY 1 *  THIS ONE MAKES NO SENSE TO HER. EUFLEXXA COPAY FOR 90 DAY SUPPLY $300.00 = QTY 3    MARIANNA SAID FOR THE PATIENT TO CALL [facility]/ P: [phone] AND ASK TO SPEAK TO SOMEONE TO APPLY FOR COPAY ASSITANCE.   AP

## 2020-12-01 ENCOUNTER — TELEPHONE (OUTPATIENT)
Dept: ORTHOPEDIC SURGERY | Age: 40
End: 2020-12-01

## 2020-12-04 DIAGNOSIS — E03.9 ACQUIRED HYPOTHYROIDISM: ICD-10-CM

## 2020-12-04 DIAGNOSIS — E11.69 DIABETES MELLITUS TYPE 2 IN OBESE (HCC): ICD-10-CM

## 2020-12-04 DIAGNOSIS — E66.9 DIABETES MELLITUS TYPE 2 IN OBESE (HCC): ICD-10-CM

## 2020-12-04 DIAGNOSIS — E78.2 MIXED HYPERLIPIDEMIA: ICD-10-CM

## 2020-12-04 LAB
A/G RATIO: 2 (ref 1.1–2.2)
ALBUMIN SERPL-MCNC: 4.4 G/DL (ref 3.4–5)
ALP BLD-CCNC: 83 U/L (ref 40–129)
ALT SERPL-CCNC: 20 U/L (ref 10–40)
ANION GAP SERPL CALCULATED.3IONS-SCNC: 12 MMOL/L (ref 3–16)
AST SERPL-CCNC: 24 U/L (ref 15–37)
BILIRUB SERPL-MCNC: 0.3 MG/DL (ref 0–1)
BUN BLDV-MCNC: 19 MG/DL (ref 7–20)
CALCIUM SERPL-MCNC: 9.5 MG/DL (ref 8.3–10.6)
CHLORIDE BLD-SCNC: 102 MMOL/L (ref 99–110)
CHOLESTEROL, TOTAL: 186 MG/DL (ref 0–199)
CO2: 28 MMOL/L (ref 21–32)
CREAT SERPL-MCNC: 0.6 MG/DL (ref 0.6–1.1)
CREATININE URINE: 235.7 MG/DL (ref 28–259)
GFR AFRICAN AMERICAN: >60
GFR NON-AFRICAN AMERICAN: >60
GLOBULIN: 2.2 G/DL
GLUCOSE BLD-MCNC: 109 MG/DL (ref 70–99)
HDLC SERPL-MCNC: 55 MG/DL (ref 40–60)
LDL CHOLESTEROL CALCULATED: 114 MG/DL
MICROALBUMIN UR-MCNC: 5.3 MG/DL
MICROALBUMIN/CREAT UR-RTO: 22.5 MG/G (ref 0–30)
POTASSIUM SERPL-SCNC: 4.9 MMOL/L (ref 3.5–5.1)
SODIUM BLD-SCNC: 142 MMOL/L (ref 136–145)
TOTAL PROTEIN: 6.6 G/DL (ref 6.4–8.2)
TRIGL SERPL-MCNC: 84 MG/DL (ref 0–150)
TSH SERPL DL<=0.05 MIU/L-ACNC: 1.09 UIU/ML (ref 0.27–4.2)
VLDLC SERPL CALC-MCNC: 17 MG/DL

## 2020-12-05 LAB
ESTIMATED AVERAGE GLUCOSE: 105.4 MG/DL
HBA1C MFR BLD: 5.3 %

## 2020-12-09 NOTE — TELEPHONE ENCOUNTER
Patient called stating, on 12/1/20, that she can not afford the co-pay. I tried calling [facility], and hung up after 8 minutes on hold.

## 2020-12-09 NOTE — TELEPHONE ENCOUNTER
12/09/2020 PER [facility] @ [facility] THEY HAVE BEEN CALLING, LAST TRIED 12/8, AND HAVE FAILED TO REACH PATIENT. THEY DID HAVE A SECOND # ON THE CHART THAT WE DON'T HAVE SO SHE NOTED TO BE SURE THEY ARE CALLING THE NUMBER WE HAVE THAT I HAD ORIGINALLY CONFIRMED WITH THEM TO BE SURE THAT IS NOT THE REASON THEY HAVE FAILED TO CONTACT PATIENT. PATIENT CAN ALSO CALL THEM [phone]. THEY NEED CONSENT TO SHIP TO THE [place] OFFICE AND ANY COPAYMENT.   ARY

## 2020-12-14 RX ORDER — BUSPIRONE HYDROCHLORIDE 30 MG/1
TABLET ORAL
Qty: 90 TABLET | Refills: 2 | Status: SHIPPED | OUTPATIENT
Start: 2020-12-14 | End: 2021-03-15

## 2020-12-14 RX ORDER — QUETIAPINE 200 MG/1
TABLET, FILM COATED, EXTENDED RELEASE ORAL
Qty: 60 TABLET | Refills: 2 | Status: SHIPPED | OUTPATIENT
Start: 2020-12-14 | End: 2021-03-15

## 2020-12-14 RX ORDER — CYCLOBENZAPRINE HCL 10 MG
TABLET ORAL
Qty: 90 TABLET | Refills: 1 | Status: SHIPPED | OUTPATIENT
Start: 2020-12-14 | End: 2021-02-12

## 2020-12-14 RX ORDER — LAMOTRIGINE 200 MG/1
TABLET ORAL
Qty: 60 TABLET | Refills: 2 | Status: SHIPPED | OUTPATIENT
Start: 2020-12-14 | End: 2021-03-15

## 2020-12-14 RX ORDER — ATORVASTATIN CALCIUM 40 MG/1
TABLET, FILM COATED ORAL
Qty: 30 TABLET | Refills: 2 | Status: SHIPPED | OUTPATIENT
Start: 2020-12-14 | End: 2021-03-15

## 2020-12-22 ENCOUNTER — PATIENT MESSAGE (OUTPATIENT)
Dept: FAMILY MEDICINE CLINIC | Age: 40
End: 2020-12-22

## 2020-12-22 NOTE — TELEPHONE ENCOUNTER
From: Sommer Churchill  To: Eldon Ochoa MD  Sent: 12/22/2020 8:48 AM EST  Subject: Prescription Question    Hello! I need a refill of aderall please!

## 2020-12-23 RX ORDER — DEXTROAMPHETAMINE SACCHARATE, AMPHETAMINE ASPARTATE MONOHYDRATE, DEXTROAMPHETAMINE SULFATE AND AMPHETAMINE SULFATE 7.5; 7.5; 7.5; 7.5 MG/1; MG/1; MG/1; MG/1
30 CAPSULE, EXTENDED RELEASE ORAL EVERY MORNING
Qty: 30 CAPSULE | Refills: 0 | Status: SHIPPED | OUTPATIENT
Start: 2020-12-23 | End: 2021-02-03

## 2020-12-29 ENCOUNTER — VIRTUAL VISIT (OUTPATIENT)
Dept: FAMILY MEDICINE CLINIC | Age: 40
End: 2020-12-29
Payer: COMMERCIAL

## 2020-12-29 PROCEDURE — 99214 OFFICE O/P EST MOD 30 MIN: CPT | Performed by: FAMILY MEDICINE

## 2020-12-29 RX ORDER — LEVOTHYROXINE SODIUM 0.05 MG/1
TABLET ORAL
Qty: 180 TABLET | Refills: 1 | Status: SHIPPED | OUTPATIENT
Start: 2020-12-29 | End: 2021-06-14

## 2020-12-29 NOTE — PROGRESS NOTES
2020    TELEHEALTH EVALUATION -- Audio/Visual (During CXOGD-90 public health emergency)    HPI:    Laura Richter (:  1980) has requested an audio/video evaluation for the following concern(s):    Chief Complaint   Patient presents with    Anxiety     f/u   sent my chart message about starting Topamax    ADHD     f/u      Had a good jose ramon with her family. She sees neurology for migraines, Dr Mcahelle Young.  Last visit in December. Is difficult to communicate with her sometimes. On botox q 3 months. But last only about 10 weeks. The last 2 weeks before she can get the repeat injections she gets many more migraines. Also uses emgality monthly, gabapentin 100 mg nightly. Still has frequent headaches despite this regimen. 2 a week. Of migrainous type (left eye, nausea, photophobia, phonophobia, throbbing). Uses imitrex for abortive treatment- works so-so. Worsen when high allergy times- like mowing grass. Worsens perimenstrually, but now has had hysterectomy and no longer cycles. She does not recall if cyclic progesterone helped headaches. Has some kind of headache almost every day. Has not tried to stop adderall for a time to see if this helps headaches. Stress still seems high, which they think worsens headaches. Sleep: feels sleep is improving. asleep within 2 hrs of taking meds (takes meds 9-10 PM), then sleeps through the night until awakens 8 am or later. Not napping during the day. No nightmares. Still has a lot of irritability during the day. But feels her bipolar symptoms are currently as good as they have been in recent years. Current regimen is correct below: pristiq, buspar, lamictal, seroquel. Chronic neck pain seh feels is a trigger also. Massage can help sometimes. Exercise: none currently. Working on weight loss. Has never exercised regularly.            Review of Systems  As above     Patient Active Problem List   Diagnosis    Allergy to eggs  Vitamin D deficiency    PMS (premenstrual syndrome)    Hypothyroidism    PCOD (polycystic ovarian disease)    Anxiety    ADD (attention deficit disorder)    Bipolar 2 disorder (HCC)    Family history of breast cancer (mother, age [address])   Day Stabs Asthma    Insomnia    Allergic rhinitis    Chronic constipation    Hyperlipidemia, unspecified    NAFLD (nonalcoholic fatty liver disease)    Chronic GERD    Migraine    Morbid obesity with BMI of 50.0-59.9, adult (HCC)    Gastroparesis    MELONIE (obstructive sleep apnea)    Diabetes mellitus type 2 in obese (HCC)    PUD (peptic ulcer disease)    Hiatal hernia with GERD    Sprain of left wrist        Prior to Visit Medications    Medication Sig Taking? Authorizing Provider   amphetamine-dextroamphetamine (ADDERALL XR) 30 MG extended release capsule Take 1 capsule by mouth every morning for 30 days.  Yes Deniz Flatness, MD   QUEtiapine (SEROQUEL XR) 200 MG extended release tablet TAKE 2 TABLETS BY MOUTH IN THE EVENING  Yes Melissa Silva MD   busPIRone (BUSPAR) 30 MG tablet TAKE 1 AND 1/2 TABLETS BY MOUTH TWO TIMES A DAY  Yes Melissa Silva MD   atorvastatin (LIPITOR) 40 MG tablet TAKE 1 TABLET BY MOUTH EVERY DAY  Yes Melissa Silva MD   cyclobenzaprine (FLEXERIL) 10 MG tablet TAKE 1 TABLET BY MOUTH EVERY EIGHT HOURS AS NEEDED FOR MUSCLE SPASM Yes Melissa Silva MD   lamoTRIgine (LAMICTAL) 200 MG tablet TAKE 1 TABLET BY MOUTH TWO TIMES A DAY  Yes Melissa Silva MD   desvenlafaxine succinate (PRISTIQ) 100 MG TB24 extended release tablet TAKE 1 TABLET BY MOUTH EVERY DAY  Yes Melissa Silva MD   SUMAtriptan (IMITREX) 50 MG tablet TAKE 1 TABLET BY MOUTH EVERY DAY AS NEEDED for migraine Yes Melissa Silva MD   linaclotide [facility]) 145 MCG capsule Take 1 capsule by mouth daily as needed (constipation) Yes Melissa Silva MD budesonide-formoterol (SYMBICORT) 160-4.5 MCG/ACT AERO INHALE 2 PUFFS BY MOUTH TWO TIMES A DAY Yes Sheela Terry MD   levothyroxine (SYNTHROID) 50 MCG tablet TAKE 1 TABLET BY MOUTH TWO TIMES A DAY  Yes Sheela Terry MD   butalbital-APAP-caffeine -40 MG CAPS per capsule TAKE 1 CAPSULE BY MOUTH EVERY EIGHT HOURS AS NEEDED FOR HEADACHES. DO NOT TAKE MORE THAN 2 CAPSULES A DAY AND 4 CAPSULES A WEEK Yes Historical Provider, MD   gabapentin (NEURONTIN) 100 MG capsule Take 100 mg by mouth 2 times daily as needed. Yes Historical Provider, MD   EMGALITY 120 MG/ML SOAJ INJECT THE CONTENTS OF 1 SYRINGE UNDER THE SKIN ONCE A MONTH IN THE ABDOMEN, THIGH, OUTER UPPER ARM, OR BUTTOCKS Yes Historical Provider, MD   onabotulinumtoxin A (BOTOX) 100 units injection Inject 100 Units into the muscle once Once every 3 months Yes Historical Provider, MD   diclofenac sodium (VOLTAREN) 1 % GEL Apply 4 g topically 4 times daily Yes Sheela Terry MD   omeprazole (PRILOSEC) 40 MG delayed release capsule TAKE 1 CAPSULE BY MOUTH TWO TIMES A DAY  Yes Sheela Terry MD   [facility] SYMTOUCH 3 MG/0.5ML SOAJ INJECT 1 AUTOINJECTOR (0.5 ML) SUBCUTANEOUSLY. MAY REPEAT IN 1 HOUR IF PAIN RETURNS OR INCREASES IN SEVERITY. MAX 2 DOSES PER 24 HRS. Yes Historical Provider, MD   Calcium Citrate-Vitamin D (CITRACAL PETITES/VITAMIN D PO) Take 2 tablets by mouth daily Yes Historical Provider, MD   Multiple Vitamins-Minerals (CENTRUM WOMEN PO) Take 2 tablets by mouth daily Yes Historical Provider, MD   Digestive Enzymes (PAPAYA ENZYME) TABS Take by mouth Yes Historical Provider, MD   Cetirizine HCl (ZYRTEC ALLERGY) 10 MG CAPS Take 1 tablet by mouth. Yes Sheela Terry MD   CANNABIDIOL PO Take by mouth  Historical Provider, MD   Prenatal Multivit-Min-Fe-FA (P D TAWANNA VITAMINS/FOLIC ACID) TABS Take 1 tablet by mouth daily.   Sheela Terry MD       Social History     Tobacco Use    Smoking status: Never Smoker  Smokeless tobacco: Never Used    Tobacco comment: congratulated on nonsmoking status. Substance Use Topics    Alcohol use: Yes     Comment: [id number]    Drug use: No            PHYSICAL EXAMINATION:  Physical Exam  Constitutional:       General: She is not in acute distress. Appearance: Normal appearance. She is not ill-appearing. Pulmonary:      Effort: Pulmonary effort is normal.   Skin:     General: Skin is warm. Neurological:      General: No focal deficit present. Mental Status: She is alert and oriented to person, place, and time. Mental status is at baseline. Psychiatric:         Mood and Affect: Mood normal.         Behavior: Behavior normal.         Thought Content: Thought content normal.         Judgment: Judgment normal.          ASSESSMENT/PLAN:    1. Chronic mixed headache syndrome  - discussed that I do not recommend adding additional medicines for headache treatment/prevention, but do recommend medical massage and daily exercise as non-pharmacologic interventions that could be very effective. - External Referral To Massage Therapy    2. Psychophysiological insomnia  - sleeping well on current med mix. .. keep this unchanged for now. 3. Bipolar 2 disorder ([facility] [address]  - still with a lot of irritability. discussed that chronic pain could be a  of this irritability also. Massage and exercise was discussed as above and may benefit mood as well. Advise to not change psych meds at this time. 4. Attention deficit disorder (ADD) without hyperactivity  - derives great benefit from stimulant therapy - we discussed that stimulants can certainly cause side effects. Consider a week without stimulant therapy to better  risks/benefits. Return in about 6 weeks (around 2/9/2021) for f/u headaches. Mercy Ahuja is a 41 y.o. female being evaluated by a Virtual Visit (video visit) encounter to address concerns as mentioned above. A caregiver was present when appropriate. Due to this being a TeleHealth encounter (During ZVUOX-41 public health emergency), evaluation of the following organ systems was limited: Vitals/Constitutional/EENT/Resp/CV/GI//MS/Neuro/Skin/Heme-Lymph-Imm. Pursuant to the emergency declaration under the [address] [place] [address] and the [employer] and [employer] Act, this Virtual Visit was conducted with patient's (and/or legal guardian's) consent, to reduce the patient's risk of exposure to COVID-19 and provide necessary medical care. The patient (and/or legal guardian) has also been advised to contact this office for worsening conditions or problems, and seek emergency medical treatment and/or call 911 if deemed necessary. Patient identification was verified at the start of the visit: Yes    Total time spent on this encounter: 25 min    Services were provided through a video synchronous discussion virtually to substitute for in-person clinic visit. Patient and provider were located at their individual homes. --Tarah Vale MD on 12/29/2020 at 10:21 AM    An electronic signature was used to authenticate this note.

## 2021-01-07 ENCOUNTER — TELEPHONE (OUTPATIENT)
Dept: ORTHOPEDIC SURGERY | Age: 41
End: 2021-01-07

## 2021-01-07 DIAGNOSIS — M17.12 PRIMARY OSTEOARTHRITIS OF LEFT KNEE: Primary | ICD-10-CM

## 2021-01-07 NOTE — TELEPHONE ENCOUNTER
2021. PA FOR MONOVISC  ON 12/10/2020. MUST SUBMIT NEW PA.  RECEIVED NEW KEY FOR ONLINE REQUEST. APPARENTLY MONOVISC HAS BEEN DENIED AND WE MUST SELECT DUROLANE, SYNVISC-ONE OR GEL-ONE (SEE SCANNED PRINT OUT).   AP

## 2021-01-12 ENCOUNTER — PATIENT MESSAGE (OUTPATIENT)
Dept: FAMILY MEDICINE CLINIC | Age: 41
End: 2021-01-12

## 2021-01-12 NOTE — TELEPHONE ENCOUNTER
From: Teddy Schaumann  To: Lacie Guevara MD  Sent: 1/12/2021 2:19 PM EST  Subject: Prescription Question    Hello! So, I've been fighting with the insurance company to get a Symbicort inhaler. After talking to [facility], I can get a Fluticasone / Salmeterol inhaler, 90 day supply for $10 through them. They need an Rx for this and it has to be written for a 90 day supply.      The number to send info to them is:  [phone], option 3 to fax

## 2021-01-14 ENCOUNTER — TELEPHONE (OUTPATIENT)
Dept: ORTHOPEDIC SURGERY | Age: 41
End: 2021-01-14

## 2021-01-14 NOTE — TELEPHONE ENCOUNTER
Called and spoke with Tri and gave her the information below. She will call to verify benefits and out of pocket if there is any.     She will call back to set up her injections once she discusses the arrival date of medication

## 2021-01-14 NOTE — TELEPHONE ENCOUNTER
01/13/2021  [address] [place] [address]   LEFT  KNEE. REAUTHORIZATION APPROVED #  [id number]. DATES: 12/14/2021 - 02/12/2021. PER FAX FROM [facility] FOR [employer]. ARY    1/14/2021 CALLED TO CONFIRM NEXT STEP. PER [facility], THE SPECIALTY PHARMACY DEPT, THEY HAVE THE VERBAL SCRIPT AND NOW THE AUTHORIZATION. THEY TRIED TO CONTACT PATIENT FOR CONSENT TO SHIP TO [place] OFFICE AND ANY COPAYMENT BUT DID NOT REACH PATIENT. THEY WILL CONTINUE THE AUTOMATED CALLS BUT PATIENT CAN CALL THEM [phone].  KENDRA MANCIA @ 19:61UB. AP

## 2021-01-29 NOTE — TELEPHONE ENCOUNTER
01/29/2021  [facility] IS SCHEDULED FOR DELIVERY VIA UPS TO THE [place] OFFICE ON 2/2/2021. SIGNATURE REQUIRED. KENDRA ARZATE @ [facility]. P: [id number].  AP

## 2021-02-02 ENCOUNTER — PATIENT MESSAGE (OUTPATIENT)
Dept: FAMILY MEDICINE CLINIC | Age: 41
End: 2021-02-02

## 2021-02-02 RX ORDER — FLUCONAZOLE 150 MG/1
150 TABLET ORAL ONCE
Qty: 2 TABLET | Refills: 0 | Status: SHIPPED | OUTPATIENT
Start: 2021-02-02 | End: 2021-02-02

## 2021-02-02 NOTE — TELEPHONE ENCOUNTER
From: Akanksha Montanez  Sent: 2/2/2021 3:54 PM EST  To: [place] Fm  Subject: RE: Prescription Question    Definitely a yeast infection. I've tried OTC stuff. Still itching and burning.     ----- Message -----  From: Renee Glass  Sent: 2/2/21, 10:38 AM  To: Yung Astudillo  Subject: RE: Prescription Question    Camilla Bingham, what kind of symptoms are you currently having?      ----- Message -----   From:Myla Astudillo   Sent:2/2/2021 10:32 AM EST   To:Martinez Ibrahim MD   Subject:Prescription Question    Hello! Is there any chance you can call in and Rx for diflucan?

## 2021-02-03 ENCOUNTER — VIRTUAL VISIT (OUTPATIENT)
Dept: FAMILY MEDICINE CLINIC | Age: 41
End: 2021-02-03
Payer: COMMERCIAL

## 2021-02-03 DIAGNOSIS — J45.20 MILD INTERMITTENT ASTHMA WITHOUT COMPLICATION: ICD-10-CM

## 2021-02-03 DIAGNOSIS — R03.0 ELEVATED BP WITHOUT DIAGNOSIS OF HYPERTENSION: Primary | ICD-10-CM

## 2021-02-03 DIAGNOSIS — G43.701 CHRONIC MIGRAINE WITHOUT AURA WITH STATUS MIGRAINOSUS, NOT INTRACTABLE: ICD-10-CM

## 2021-02-03 PROCEDURE — 99214 OFFICE O/P EST MOD 30 MIN: CPT | Performed by: FAMILY MEDICINE

## 2021-02-03 RX ORDER — PROPRANOLOL HCL 60 MG
60 CAPSULE, EXTENDED RELEASE 24HR ORAL DAILY
Qty: 30 CAPSULE | Refills: 2 | Status: SHIPPED | OUTPATIENT
Start: 2021-02-03 | End: 2021-04-19

## 2021-02-03 NOTE — PROGRESS NOTES
2/3/2021    TELEHEALTH EVALUATION -- Audio/Visual (During ALPEC-56 public health emergency)    HPI:    Ciaran Strickland (:  1980) has requested an audio/video evaluation for the following concern(s):    Headaches (migraines) have been able to be reduced by reducing salt in diet. But not 100% better. She also changed from adderal xr to lower dose ritalin recently to reduce migraine frequency also. She has been keeping tack of blood pressure the past 5 days and reports that they are usually 'high'  123/79 to 148/94 but typical dbp is high 80's to low 90's. Typical sbp is 130s- low 140's. No previous dx of HTN. Current meds for migraine prevention: lamictal, botox, emgality, CBD oil, gabapentin 100 bid. Migraines usually start in the mornings, or awake with them. Last renal function test: normal  Lab Results   Component Value Date     2020    K 4.9 2020    K 4.1 10/10/2018    BUN 19 2020    CREATININE 0.6 2020          Review of Systems As above     Patient Active Problem List   Diagnosis    Allergy to eggs    Vitamin D deficiency    PMS (premenstrual syndrome)    Hypothyroidism    PCOD (polycystic ovarian disease)    Anxiety    ADD (attention deficit disorder)    Bipolar 2 disorder ([facility]    Family history of breast cancer (mother, age 36)   [facility] Asthma    Insomnia    Allergic rhinitis    Chronic constipation    Hyperlipidemia, unspecified    NAFLD (nonalcoholic fatty liver disease)    Chronic GERD    Migraine    Morbid obesity with BMI of 50.0-59.9, adult (HCC)    Gastroparesis    MELONIE (obstructive sleep apnea)    Diabetes mellitus type 2 in obese ([facility]    PUD (peptic ulcer disease)    Hiatal hernia with GERD    Sprain of left wrist        Prior to Visit Medications    Medication Sig Taking?  Authorizing Provider   SUMAtriptan (IMITREX) 50 MG tablet TAKE 1 TABLET BY MOUTH EVERY DAY AS NEEDED FOR MIGRAINES Yes Torsten Jesus MD fluticasone-salmeterol (ADVAIR HFA) 115-21 MCG/ACT inhaler Inhale 2 puffs into the lungs 2 times daily Yes Rudi Fabry, MD   ALPRAZolam [facility]) 2 MG tablet TAKE 1 TABLET BY MOUTH THREE TIMES A DAY AS NEEDED FOR ANXIETY AND SLEEP Yes Rudi Fabry, MD   methylphenidate (RITALIN) 10 MG tablet TAKE 1 TABLET BY MOUTH TWO TIMES A DAY Yes Rudi Fabry, MD   levothyroxine (SYNTHROID) 50 MCG tablet TAKE 1 TABLET BY MOUTH TWO TIMES A DAY Yes Rudi Fabry, MD   QUEtiapine (SEROQUEL XR) 200 MG extended release tablet TAKE 2 TABLETS BY MOUTH IN THE EVENING  Yes Rudi Fabry, MD   busPIRone (BUSPAR) 30 MG tablet TAKE 1 AND 1/2 TABLETS BY MOUTH TWO TIMES A DAY  Yes Rudi Fabry, MD   atorvastatin (LIPITOR) 40 MG tablet TAKE 1 TABLET BY MOUTH EVERY DAY  Yes Rudi Fabry, MD   cyclobenzaprine (FLEXERIL) 10 MG tablet TAKE 1 TABLET BY MOUTH EVERY EIGHT HOURS AS NEEDED FOR MUSCLE SPASM Yes Rudi Fabry, MD   lamoTRIgine (LAMICTAL) 200 MG tablet TAKE 1 TABLET BY MOUTH TWO TIMES A DAY  Yes Rudi Fabry, MD   desvenlafaxine succinate (PRISTIQ) 100 MG TB24 extended release tablet TAKE 1 TABLET BY MOUTH EVERY DAY  Yes Rudi Fabry, MD   linaclotide [facility]) 145 MCG capsule Take 1 capsule by mouth daily as needed (constipation) Yes Rudi Fabry, MD   budesonide-formoterol (SYMBICORT) 160-4.5 MCG/ACT AERO INHALE 2 PUFFS BY MOUTH TWO TIMES A DAY Yes Rudi Fabry, MD   butalbital-APAP-caffeine -40 MG CAPS per capsule TAKE 1 CAPSULE BY MOUTH EVERY EIGHT HOURS AS NEEDED FOR HEADACHES. DO NOT TAKE MORE THAN 2 CAPSULES A DAY AND 4 CAPSULES A WEEK Yes Historical Provider, MD   gabapentin (NEURONTIN) 100 MG capsule Take 100 mg by mouth 2 times daily as needed.  Yes Historical Provider, MD   CANNABIDIOL PO Take by mouth Yes Historical Provider, MD EMGALITY 120 MG/ML SOAJ INJECT THE CONTENTS OF 1 SYRINGE UNDER THE SKIN ONCE A MONTH IN THE ABDOMEN, THIGH, OUTER UPPER ARM, OR BUTTOCKS Yes Historical Provider, MD   onabotulinumtoxin A (BOTOX) 100 units injection Inject 100 Units into the muscle once Once every 3 months Yes Historical Provider, MD   diclofenac sodium (VOLTAREN) 1 % GEL Apply 4 g topically 4 times daily Yes Lyle Salcedo MD   omeprazole (PRILOSEC) 40 MG delayed release capsule TAKE 1 CAPSULE BY MOUTH TWO TIMES A DAY  Yes Lyle Salcedo MD   [facility] SYMTOUCH 3 MG/0.5ML SOAJ INJECT 1 AUTOINJECTOR (0.5 ML) SUBCUTANEOUSLY. MAY REPEAT IN 1 HOUR IF PAIN RETURNS OR INCREASES IN SEVERITY. MAX 2 DOSES PER 24 HRS. Yes Historical Provider, MD   Calcium Citrate-Vitamin D (CITRACAL PETITES/VITAMIN D PO) Take 2 tablets by mouth daily Yes Historical Provider, MD   Multiple Vitamins-Minerals (CENTRUM WOMEN PO) Take 2 tablets by mouth daily Yes Historical Provider, MD   Digestive Enzymes (PAPAYA ENZYME) TABS Take by mouth Yes Historical Provider, MD   Cetirizine HCl (ZYRTEC ALLERGY) 10 MG CAPS Take 1 tablet by mouth. Yes Lyle Salcedo MD   amphetamine-dextroamphetamine (ADDERALL XR) 30 MG extended release capsule Take 1 capsule by mouth every morning for 30 days. Sandy Multani MD   Prenatal Multivit-Min-Fe-FA (P D  VITAMINS/FOLIC ACID) TABS Take 1 tablet by mouth daily. Lyle Salcedo MD       Social History     Tobacco Use    Smoking status: Never Smoker    Smokeless tobacco: Never Used    Tobacco comment: congratulated on nonsmoking status. Substance Use Topics    Alcohol use: Yes     Comment: [id number]    Drug use: No            PHYSICAL EXAMINATION:  Physical Exam  Vitals signs and nursing note reviewed. Constitutional:       Appearance: Normal appearance. She is well-developed. Neck:      Musculoskeletal: Normal range of motion and neck supple. Thyroid: No thyromegaly. Vascular: No carotid bruit. Cardiovascular:      Rate and Rhythm: Normal rate and regular rhythm. Pulses: Normal pulses. Heart sounds: Normal heart sounds. No murmur. Pulmonary:      Effort: Pulmonary effort is normal. No respiratory distress. Breath sounds: Normal breath sounds. No wheezing or rales. Musculoskeletal: Normal range of motion. Right lower leg: No edema. Left lower leg: No edema. Skin:     General: Skin is warm and dry. Neurological:      General: No focal deficit present. Mental Status: She is alert and oriented to person, place, and time. Mental status is at baseline. Psychiatric:         Mood and Affect: Mood normal.         Behavior: Behavior normal.         Thought Content: Thought content normal.         Judgment: Judgment normal.          ASSESSMENT/PLAN:    1. Elevated BP without diagnosis of hypertension  - on stimulant therapy (and many meds) and has mult risk factors for HTN, including obesity. continue to monitor.  (adding inderal for migraines today may help bp also)    2. Chronic migraine without aura with status migrainosus, not intractable  - add inderal la 60 for prevention (ask dr Jailyn Stark about pehaps stopping gabapentin if possible, since it is a low dose). This may help bp, may worsen asthma. She will notify me of any side effects by email. 3. Mild intermittent asthma without complication  - she never uses albuterol, only uses symbicort occasionally. discussed that inderal might affect asthma. If it does we can change to verapamil for headaches (but she also struggles with constipation, so there is not likely an ideal option for her).       Keep f/u in March Rody Woodson is a 41 y.o. female being evaluated by a Virtual Visit (video visit) encounter to address concerns as mentioned above. A caregiver was present when appropriate. Due to this being a TeleHealth encounter (During UOPJN-36 public health emergency), evaluation of the following organ systems was limited: Vitals/Constitutional/EENT/Resp/CV/GI//MS/Neuro/Skin/Heme-Lymph-Imm. Pursuant to the emergency declaration under the [address] [place] [address], [address] [place] [address] and the [employer] and [employer] Act, this Virtual Visit was conducted with patient's (and/or legal guardian's) consent, to reduce the patient's risk of exposure to COVID-19 and provide necessary medical care. The patient (and/or legal guardian) has also been advised to contact this office for worsening conditions or problems, and seek emergency medical treatment and/or call 911 if deemed necessary. Patient identification was verified at the start of the visit: Yes    Total time spent on this encounter: 21 or more minutes were spent on the digital evaluation and management of this patient. Services were provided through a video synchronous discussion virtually to substitute for in-person clinic visit. Patient and provider were located at their individual homes. --Cheryl Primrose, MD on 2/3/2021 at 8:04 AM    An electronic signature was used to authenticate this note.

## 2021-02-03 NOTE — TELEPHONE ENCOUNTER
2/3/2021  UPDATED STATUS OF DELIVERY. [facility] SCHEDULED DELIVERY ON 2/2/21 IN ERROR NOT HAVING AUTHORIZATION FROM PATIENT TO SHIP SO SHIPMENT DID NOT GO OUT. THEY CALLED TODAY AND LVM. PATIENT CAN CALL THEM [phone] TO GIVE AUTHORIZATION TO SHIP TO THE [place] OFFICE. PER FLORIDALMA @ [employer].  AP

## 2021-02-04 NOTE — TELEPHONE ENCOUNTER
Called and she will call them when she discusses this with her , she does have a 300$ copay for the injection.   She will call me back

## 2021-02-10 ENCOUNTER — VIRTUAL VISIT (OUTPATIENT)
Dept: FAMILY MEDICINE CLINIC | Age: 41
End: 2021-02-10
Payer: COMMERCIAL

## 2021-02-10 DIAGNOSIS — E11.69 DIABETES MELLITUS TYPE 2 IN OBESE (HCC): ICD-10-CM

## 2021-02-10 DIAGNOSIS — N94.3 PMS (PREMENSTRUAL SYNDROME): ICD-10-CM

## 2021-02-10 DIAGNOSIS — E66.01 MORBID OBESITY WITH BMI OF 50.0-59.9, ADULT (HCC): Primary | ICD-10-CM

## 2021-02-10 DIAGNOSIS — E66.9 DIABETES MELLITUS TYPE 2 IN OBESE (HCC): ICD-10-CM

## 2021-02-10 PROCEDURE — 99214 OFFICE O/P EST MOD 30 MIN: CPT | Performed by: FAMILY MEDICINE

## 2021-02-10 RX ORDER — NALTREXONE HYDROCHLORIDE 50 MG/1
TABLET, FILM COATED ORAL
Qty: 30 TABLET | Refills: 1 | Status: SHIPPED | OUTPATIENT
Start: 2021-02-10 | End: 2021-04-07 | Stop reason: SDUPTHER

## 2021-02-10 RX ORDER — METFORMIN HYDROCHLORIDE 500 MG/1
1000 TABLET, EXTENDED RELEASE ORAL
Qty: 60 TABLET | Refills: 3 | Status: SHIPPED | OUTPATIENT
Start: 2021-02-10 | End: 2021-04-07

## 2021-02-10 NOTE — PROGRESS NOTES
2/10/2021    TELEHEALTH EVALUATION -- Audio/Visual (During AXWQY-93 public health emergency)    HPI:    Narendra Becker (:  1980) has requested an audio/video evaluation for the following concern(s):    Chief Complaint   Patient presents with    Follow-up     weight management      Wishes to discuss aids to weight loss. Has had bariatric surgery 10/18. Max wt was 348. Lowest wt after surgery 269. Current wt is 280 lbs. So currently considered a statistical success. But is gaining slowly. Craves food. Yung carbs. Goes through phases of eliminating, then overeating sugars. Late night snacking is the most dangerous time for her. She uses meds that promote obesity: seroquel for sleep (takes it 2 hrs prior to bedtime). She was dx with type 2 diabetes, but has not needed rx since her surgery. She did not note much change in appetite or weight on Trulicity when she used it previously. Lab Results   Component Value Date    LABA1C 5.3 2020    LABA1C 5.4 2019    LABA1C 6.2 2018      She did tolerate metformin. Was on that for a long time for PCOS. Has tried naltrexone in past for PMS. Not sure why she stopped it. Thinks it helped PMS. Kathy Hash to try that again also. She has had a uterine ablation then ROSE, so she does not cycle, but still has cyclic PMS symptoms.       Review of Systems As above     Patient Active Problem List   Diagnosis    Allergy to eggs    Vitamin D deficiency    PMS (premenstrual syndrome)    Hypothyroidism    PCOD (polycystic ovarian disease)    Anxiety    ADD (attention deficit disorder)    Bipolar 2 disorder ([facility] [address]    Family history of breast cancer (mother, age 36)   [facility] Asthma    Insomnia    Allergic rhinitis    Chronic constipation    Hyperlipidemia, unspecified    NAFLD (nonalcoholic fatty liver disease)    Chronic GERD    Migraine    Morbid obesity with BMI of 50.0-59.9, adult (HCC)    Gastroparesis CANNABIDIOL PO Take by mouth Yes Historical Provider, MD   EMGALITY 120 MG/ML SOAJ INJECT THE CONTENTS OF 1 SYRINGE UNDER THE SKIN ONCE A MONTH IN THE ABDOMEN, THIGH, OUTER UPPER ARM, OR BUTTOCKS Yes Historical Provider, MD   onabotulinumtoxin A (BOTOX) 100 units injection Inject 100 Units into the muscle once Once every 3 months Yes Historical Provider, MD   diclofenac sodium (VOLTAREN) 1 % GEL Apply 4 g topically 4 times daily Yes Rene Solano MD   omeprazole (PRILOSEC) 40 MG delayed release capsule TAKE 1 CAPSULE BY MOUTH TWO TIMES A DAY  Yes Rene Solano MD   [facility] SYMTOUCH 3 MG/0.5ML SOAJ INJECT 1 AUTOINJECTOR (0.5 ML) SUBCUTANEOUSLY. MAY REPEAT IN 1 HOUR IF PAIN RETURNS OR INCREASES IN SEVERITY. MAX 2 DOSES PER 24 HRS. Yes Historical Provider, MD   Calcium Citrate-Vitamin D (CITRACAL PETITES/VITAMIN D PO) Take 2 tablets by mouth daily Yes Historical Provider, MD   Multiple Vitamins-Minerals (CENTRUM WOMEN PO) Take 2 tablets by mouth daily Yes Historical Provider, MD   Digestive Enzymes (PAPAYA ENZYME) TABS Take by mouth Yes Historical Provider, MD   Cetirizine HCl (ZYRTEC ALLERGY) 10 MG CAPS Take 1 tablet by mouth. Yes Rene Solano MD   Prenatal Multivit-Min-Fe-FA (P D  VITAMINS/FOLIC ACID) TABS Take 1 tablet by mouth daily. Rene Solano MD       Social History     Tobacco Use    Smoking status: Never Smoker    Smokeless tobacco: Never Used    Tobacco comment: congratulated on nonsmoking status. Substance Use Topics    Alcohol use: Yes     Comment: [id number]    Drug use: No            PHYSICAL EXAMINATION:  Physical Exam  Constitutional:       General: She is not in acute distress. Appearance: Normal appearance. She is obese. She is not ill-appearing. Pulmonary:      Effort: Pulmonary effort is normal.   Skin:     General: Skin is warm. Neurological:      General: No focal deficit present. Mental Status: She is alert and oriented to person, place, and time. Mental status is at baseline. Psychiatric:         Mood and Affect: Mood normal.         Behavior: Behavior normal.         Thought Content: Thought content normal.         Judgment: Judgment normal.          ASSESSMENT/PLAN:    1. Morbid obesity with BMI of 50.0-59.9, adult ([facility]  discussed aspects of wt loss and the complex behavior patters governing eating, including the various Rx options to aid in wt loss. Since she does have DM and is not on metformin currently, I think that would be the most logical first stop as this is associated with modest weight reduction (~4 kg on agerage, I think). Also discussed naltrexone (part of contrave) that is purported to blunt the crave-reward cycle that can drive 'emotional eating' in some. She feels she struggles particularly with this - eating to feel better emotionally/mentally. Naltrexone may also help with PMS sympoms. discussed expected side effects. Start 1/4 tab nightly if possible (1/2 tab if she finds them difficult to split into smaller pieces). If she does not tolerate that high of a dose, I will provide a naltrexone solution recipe. Another option to help weight loss may be to reduce seroquel dose or change to a sleep aid less likely to induce hunger. 2. Diabetes mellitus type 2 in obese ([facility]  - a1c to goal on diet alone, but will start metformin to help with weight and long term glycemic stability (prevent progression over time). Start 500, then 1000 mg/d. Can increase dose if needed and if tolerated    3. PMS (premenstrual syndrome)  - try adding naltrexone, as above. Return in about 8 weeks (around 4/7/2021) for f/u obesity. Karyle Broody is a 41 y.o. female being evaluated by a Virtual Visit (video visit) encounter to address concerns as mentioned above. A caregiver was present when appropriate. Due to this being a TeleHealth encounter (During ZCACI-16 public health emergency), evaluation of the following organ systems was limited: Vitals/Constitutional/EENT/Resp/CV/GI//MS/Neuro/Skin/Heme-Lymph-Imm. Pursuant to the emergency declaration under the [address] [place] [address] and the [employer] and [employer] Act, this Virtual Visit was conducted with patient's (and/or legal guardian's) consent, to reduce the patient's risk of exposure to COVID-19 and provide necessary medical care. The patient (and/or legal guardian) has also been advised to contact this office for worsening conditions or problems, and seek emergency medical treatment and/or call 911 if deemed necessary. Patient identification was verified at the start of the visit: Yes    Total time spent on this encounter: 21 or more minutes were spent on the digital evaluation and management of this patient. Services were provided through a video synchronous discussion virtually to substitute for in-person clinic visit. Patient and provider were located at their individual homes. --Maddi Coburn MD on 2/10/2021 at 8:25 AM    An electronic signature was used to authenticate this note.

## 2021-02-12 ENCOUNTER — PATIENT MESSAGE (OUTPATIENT)
Dept: FAMILY MEDICINE CLINIC | Age: 41
End: 2021-02-12

## 2021-02-12 RX ORDER — CYCLOBENZAPRINE HCL 10 MG
TABLET ORAL
Qty: 90 TABLET | Refills: 0 | Status: SHIPPED | OUTPATIENT
Start: 2021-02-12 | End: 2021-03-15

## 2021-02-12 RX ORDER — LIRAGLUTIDE 6 MG/ML
INJECTION, SOLUTION SUBCUTANEOUS
Qty: 5 PEN | Refills: 3 | Status: SHIPPED | OUTPATIENT
Start: 2021-02-12 | End: 2021-07-23

## 2021-02-12 NOTE — TELEPHONE ENCOUNTER
From: Cristel Stubbs  To: Lyle Salcedo MD  Sent: 2/12/2021 1:10 PM EST  Subject: Visit Follow-Up Question    I have this coupon for saxenda. Would this work better for me?

## 2021-03-03 ENCOUNTER — PATIENT MESSAGE (OUTPATIENT)
Dept: FAMILY MEDICINE CLINIC | Age: 41
End: 2021-03-03

## 2021-03-03 NOTE — TELEPHONE ENCOUNTER
I spoke to pharmacy and patient is not due for a refill until 1/13. Even at full 3 mg daily dose 5 pens should last 30 days. There are refills available. I LM for patient to call back to discuss.

## 2021-03-03 NOTE — TELEPHONE ENCOUNTER
From: Hunt Keepers  To: Kai Altamirano MD  Sent: 3/3/2021 10:34 AM EST  Subject: Prescription Question    Hello! I need a refill of my saxenda. I'm not sure if the pharmacy has contacted you about it. But I need more than one box at a time now.

## 2021-03-10 NOTE — TELEPHONE ENCOUNTER
I spoke to patient and explained that she is to take 3 mg not 3 ml. There are 18 mg in 1 pen so that is 6 days. Patient is not sure how many clicks of the pen that is. I advised her to to to the pharmacy and they can explain it to her.

## 2021-03-11 ENCOUNTER — TELEPHONE (OUTPATIENT)
Dept: FAMILY MEDICINE CLINIC | Age: 41
End: 2021-03-11

## 2021-03-11 NOTE — TELEPHONE ENCOUNTER
[phone] called asking for pen needles for this pt for her prescription of SAXENDA    Please Advise

## 2021-03-14 DIAGNOSIS — F51.04 PSYCHOPHYSIOLOGICAL INSOMNIA: ICD-10-CM

## 2021-03-14 DIAGNOSIS — E78.2 MIXED HYPERLIPIDEMIA: ICD-10-CM

## 2021-03-14 DIAGNOSIS — F31.81 BIPOLAR 2 DISORDER (HCC): ICD-10-CM

## 2021-03-15 RX ORDER — BUSPIRONE HYDROCHLORIDE 30 MG/1
TABLET ORAL
Qty: 90 TABLET | Refills: 2 | Status: SHIPPED | OUTPATIENT
Start: 2021-03-15 | End: 2021-06-14

## 2021-03-15 RX ORDER — QUETIAPINE 200 MG/1
TABLET, FILM COATED, EXTENDED RELEASE ORAL
Qty: 60 TABLET | Refills: 2 | Status: SHIPPED | OUTPATIENT
Start: 2021-03-15 | End: 2021-06-14

## 2021-03-15 RX ORDER — ATORVASTATIN CALCIUM 40 MG/1
TABLET, FILM COATED ORAL
Qty: 30 TABLET | Refills: 2 | Status: SHIPPED | OUTPATIENT
Start: 2021-03-15 | End: 2021-06-14

## 2021-03-15 RX ORDER — LAMOTRIGINE 200 MG/1
TABLET ORAL
Qty: 60 TABLET | Refills: 2 | Status: SHIPPED | OUTPATIENT
Start: 2021-03-15 | End: 2021-06-14

## 2021-03-15 RX ORDER — CYCLOBENZAPRINE HCL 10 MG
TABLET ORAL
Qty: 90 TABLET | Refills: 2 | Status: SHIPPED | OUTPATIENT
Start: 2021-03-15 | End: 2021-06-14

## 2021-04-07 ENCOUNTER — TELEMEDICINE (OUTPATIENT)
Dept: FAMILY MEDICINE CLINIC | Age: 41
End: 2021-04-07
Payer: COMMERCIAL

## 2021-04-07 DIAGNOSIS — F31.81 BIPOLAR 2 DISORDER (HCC): ICD-10-CM

## 2021-04-07 DIAGNOSIS — N94.3 PMS (PREMENSTRUAL SYNDROME): ICD-10-CM

## 2021-04-07 DIAGNOSIS — F98.8 ATTENTION DEFICIT DISORDER (ADD) WITHOUT HYPERACTIVITY: ICD-10-CM

## 2021-04-07 DIAGNOSIS — E66.01 MORBID OBESITY WITH BMI OF 50.0-59.9, ADULT (HCC): ICD-10-CM

## 2021-04-07 DIAGNOSIS — F41.9 ANXIETY: Primary | ICD-10-CM

## 2021-04-07 PROCEDURE — 99214 OFFICE O/P EST MOD 30 MIN: CPT | Performed by: FAMILY MEDICINE

## 2021-04-07 RX ORDER — NALTREXONE HYDROCHLORIDE 50 MG/1
TABLET, FILM COATED ORAL
Qty: 45 TABLET | Refills: 3 | Status: SHIPPED | OUTPATIENT
Start: 2021-04-07 | End: 2022-04-26

## 2021-04-07 RX ORDER — METHYLPHENIDATE HYDROCHLORIDE 10 MG/1
TABLET ORAL
Qty: 60 TABLET | Refills: 0 | Status: SHIPPED | OUTPATIENT
Start: 2021-04-07 | End: 2021-06-02 | Stop reason: SDUPTHER

## 2021-04-07 NOTE — PROGRESS NOTES
2021    TELEHEALTH EVALUATION -- Audio/Visual (During LXGZF-88 public health emergency)    HPI:    Braeden Robertson (:  1980) has requested an audio/video evaluation for the following concern(s):  She wishes to discuss her mental health today. Hx anxiety, ADHD, bipolar. Increased stress of mother in law, Yen Gillette, that is very ill with cancer and not expected to live much longer. Many family dynamics to deal with, and she is a part of all that. [employer] helps transport and manage her mother in law and manage her medical care, wounds, etc.      She feels she is managing OK and feels good about her work. Still using pristiq 100, alprazolam 2-3 times a day (which helps reduce irritability/frustration), buspar 30 TID for anxiety. lamictal 400, seroquel 400 for bipolar. Mainly irritability. ritalin  Headaches better on this regimen. She has been using saxenda for wt loss. She struggled to figure out how to use the pen needles and has just started to use it about 10 days ago. Her current dose is 1.2 mg. She has noted a decrease in her appetite without significant nausea. She struggles to control eating at night- after everyone else goes to bed. She took naltrexone for about a week then stopped it. She did not understand what it was for. She did not recall that it was for PMS primarily. She tried breaking into 1/4 tabs, but it is an oblong pill and this was difficult. She had no side effects. She does monitor weight at home. Last weight was 2 weeks ago was 280 lbs. Up 10 lbs from before.     Review of Systems As above     Patient Active Problem List   Diagnosis    Allergy to eggs    Vitamin D deficiency    PMS (premenstrual syndrome)    Hypothyroidism    PCOD (polycystic ovarian disease)    Anxiety    ADD (attention deficit disorder)    Bipolar 2 disorder ([facility] [address]    Family history of breast cancer (mother, age 36)   [facility] Asthma    Insomnia    Allergic rhinitis    Chronic constipation    Hyperlipidemia, unspecified    NAFLD (nonalcoholic fatty liver disease)    Chronic GERD    Migraine    Morbid obesity with BMI of 50.0-59.9, adult (HCC)    Gastroparesis    MELONIE (obstructive sleep apnea)    Diabetes mellitus type 2 in obese (HCC)    PUD (peptic ulcer disease)    Hiatal hernia with GERD    Sprain of left wrist        Prior to Visit Medications    Medication Sig Taking? Authorizing Provider   cyclobenzaprine (FLEXERIL) 10 MG tablet TAKE 1 TABLET BY MOUTH EVERY EIGHT HOURS AS NEEDED FOR MUSCLE SPASM Yes Geraldo Wei MD   atorvastatin (LIPITOR) 40 MG tablet TAKE 1 TABLET BY MOUTH EVERY DAY  Yes Geraldo Wei MD   busPIRone (BUSPAR) 30 MG tablet TAKE 1 AND 1/2 TABLETS BY MOUTH TWO TIMES A DAY  Yes Geraldo Wei MD   QUEtiapine (SEROQUEL XR) 200 MG extended release tablet TAKE 2 TABLETS BY MOUTH IN THE EVENING  Yes Geraldo Wei MD   lamoTRIgine (LAMICTAL) 200 MG tablet TAKE 1 TABLET BY MOUTH TWO TIMES A DAY  Yes Geraldo Wei MD   Insulin Pen Needle 32G X 4 MM MISC 1 each by Does not apply route daily Yes Geraldo Wei MD   ALPRAZolam [place] [address]) 2 MG tablet TAKE 1 TABLET BY MOUTH THREE TIMES A DAY AS NEEDED FOR ANXIETY AND SLEEP Yes Geraldo Wei MD   liraglutide-weight management (SAXENDA) 18 MG/3ML SOPN 0.6 mg subcutaneously in the AM for 1 week, then 1.2 mg for 1 week, then 1.8 mg for 1 week, then 2.4 mg for 1 week, then 3.0 mg thereafter.  Yes Geraldo Wei MD   propranolol (INDERAL LA) 60 MG extended release capsule Take 1 capsule by mouth daily Yes Geraldo Wei MD   fluticasone-salmeterol ([facility]) 115-21 MCG/ACT inhaler Inhale 2 puffs into the lungs 2 times daily Yes Geraldo Wei MD   SUMAtriptan (IMITREX) 50 MG tablet TAKE 1 TABLET BY MOUTH EVERY DAY AS NEEDED FOR MIGRAINES Yes Geraldo Wei MD   levothyroxine (SYNTHROID) 50 MCG tablet TAKE 1 TABLET BY MOUTH TWO TIMES A DAY Yes Tere Ochoa MD   desvenlafaxine succinate (PRISTIQ) 100 MG TB24 extended release tablet TAKE 1 TABLET BY MOUTH EVERY DAY  Yes Tere Ochoa MD   linaclotide [facility]) 145 MCG capsule Take 1 capsule by mouth daily as needed (constipation) Yes Tere Ochoa MD   budesonide-formoterol (SYMBICORT) 160-4.5 MCG/ACT AERO INHALE 2 PUFFS BY MOUTH TWO TIMES A DAY Yes Tere Ochoa MD   gabapentin (NEURONTIN) 100 MG capsule Take 100 mg by mouth 2 times daily as needed. Yes Historical Provider, MD   CANNABIDIOL PO Take by mouth Yes Historical Provider, MD   EMGALITY 120 MG/ML SOAJ INJECT THE CONTENTS OF 1 SYRINGE UNDER THE SKIN ONCE A MONTH IN THE ABDOMEN, THIGH, OUTER UPPER ARM, OR BUTTOCKS Yes Historical Provider, MD   onabotulinumtoxin A (BOTOX) 100 units injection Inject 100 Units into the muscle once Once every 3 months Yes Historical Provider, MD   diclofenac sodium (VOLTAREN) 1 % GEL Apply 4 g topically 4 times daily Yes Tere Ochoa MD   omeprazole (PRILOSEC) 40 MG delayed release capsule TAKE 1 CAPSULE BY MOUTH TWO TIMES A DAY  Yes Tere Ochoa MD   [facility] SYMTOUCH 3 MG/0.5ML SOAJ INJECT 1 AUTOINJECTOR (0.5 ML) SUBCUTANEOUSLY. MAY REPEAT IN 1 HOUR IF PAIN RETURNS OR INCREASES IN SEVERITY. MAX 2 DOSES PER 24 HRS. Yes Historical Provider, MD   Calcium Citrate-Vitamin D (CITRACAL PETITES/VITAMIN D PO) Take 2 tablets by mouth daily Yes Historical Provider, MD   Multiple Vitamins-Minerals (CENTRUM WOMEN PO) Take 2 tablets by mouth daily Yes Historical Provider, MD   Cetirizine HCl (ZYRTEC ALLERGY) 10 MG CAPS Take 1 tablet by mouth. Yes Tere Ochoa MD   metFORMIN (GLUCOPHAGE-XR) 500 MG extended release tablet Take 2 tablets by mouth daily (with breakfast)  Patient not taking: Reported on 4/7/2021  Tere Ochoa MD   naltrexone (DEPADE) 50 MG tablet 1/4 tab po nightly.   Patient not taking: Reported on 4/7/2021  Tere Ochoa MD   Digestive Enzymes (PAPAYA ENZYME) TABS Take by mouth  Historical Provider, MD   Prenatal Multivit-Min-Fe-FA (P D TAWANNA VITAMINS/FOLIC ACID) TABS Take 1 tablet by mouth daily. Aicha Arce MD       Social History     Tobacco Use    Smoking status: Never Smoker    Smokeless tobacco: Never Used    Tobacco comment: congratulated on nonsmoking status. Substance Use Topics    Alcohol use: Yes     Comment: [id number]    Drug use: No            PHYSICAL EXAMINATION:  Physical Exam  Constitutional:       General: She is not in acute distress. Appearance: Normal appearance. She is not ill-appearing. Pulmonary:      Effort: Pulmonary effort is normal.   Skin:     General: Skin is warm. Neurological:      General: No focal deficit present. Mental Status: She is alert and oriented to person, place, and time. Mental status is at baseline. Psychiatric:         Mood and Affect: Mood normal.         Behavior: Behavior normal.         Thought Content: Thought content normal.         Judgment: Judgment normal.          ASSESSMENT/PLAN:    1. Anxiety  - managing very stressful time better than expected. Continue current medications and treatment plan. 2. Bipolar 2 disorder (HCC)  - stable, continue lamicatal and nightly seroquel    3. PMS (premenstrual syndrome)  - we discussed that she has historically had mild-moderate decompensation of psych challenges premenstrually. Naltrexone was intended to try and blunt that. She now recalls and will restart it at 25 mg/night dose, will observe effects. We discussed also that naltrexone, as part of Contrave is thought to blunt the crave-reward cycle and thus may help reduce night time eating patterns. 4. Attention deficit disorder (ADD) without hyperactivity  - less headaches on this versus the adderall previously. Continue low dose ritalin BID PRN. - methylphenidate (RITALIN) 10 MG tablet; TAKE 1 TABLET BY MOUTH TWO TIMES A DAY  Dispense: 60 tablet; Refill: 0    5.  Morbid obesity with BMI of 50.0-59.9, adult [facility])  - she has just started Saxenda. Does not have formal diet plan at this time due to life stressors. She will continue dose titration to 3 mg and follow up in 2 months      Return in about 8 weeks (around 6/4/2021) for fu obesity. Raffi Black is a 41 y.o. female being evaluated by a Virtual Visit (video visit) encounter to address concerns as mentioned above. A caregiver was present when appropriate. Due to this being a TeleHealth encounter (During UQKLG-90 public health emergency), evaluation of the following organ systems was limited: Vitals/Constitutional/EENT/Resp/CV/GI//MS/Neuro/Skin/Heme-Lymph-Imm. Pursuant to the emergency declaration under the [address] [place] [address], [address] [place] [address] authority and the [employer] Act, this Virtual Visit was conducted with patient's (and/or legal guardian's) consent, to reduce the patient's risk of exposure to COVID-19 and provide necessary medical care. The patient (and/or legal guardian) has also been advised to contact this office for worsening conditions or problems, and seek emergency medical treatment and/or call 911 if deemed necessary. Patient identification was verified at the start of the visit: Yes    Total time spent on this encounter: 30 min    Services were provided through a video synchronous discussion virtually to substitute for in-person clinic visit. Patient and provider were located at their individual homes. --Angely Mcdowell MD on 4/7/2021 at 8:02 AM    An electronic signature was used to authenticate this note.

## 2021-04-18 ENCOUNTER — PATIENT MESSAGE (OUTPATIENT)
Dept: FAMILY MEDICINE CLINIC | Age: 41
End: 2021-04-18

## 2021-05-03 RX ORDER — OMEPRAZOLE 40 MG/1
CAPSULE, DELAYED RELEASE ORAL
Qty: 60 CAPSULE | Refills: 3 | Status: SHIPPED | OUTPATIENT
Start: 2021-05-03 | End: 2021-09-13

## 2021-05-14 ENCOUNTER — NURSE TRIAGE (OUTPATIENT)
Dept: OTHER | Facility: CLINIC | Age: 41
End: 2021-05-14

## 2021-05-14 NOTE — TELEPHONE ENCOUNTER
Caller got disconnected/hung up. I reached back out to notify her it is OK to be seen in office today.

## 2021-06-01 RX ORDER — SUMATRIPTAN 50 MG/1
TABLET, FILM COATED ORAL
Qty: 9 TABLET | Refills: 0 | Status: SHIPPED | OUTPATIENT
Start: 2021-06-01 | End: 2021-06-15

## 2021-06-02 DIAGNOSIS — F98.8 ATTENTION DEFICIT DISORDER (ADD) WITHOUT HYPERACTIVITY: ICD-10-CM

## 2021-06-02 RX ORDER — METHYLPHENIDATE HYDROCHLORIDE 10 MG/1
TABLET ORAL
Qty: 60 TABLET | Refills: 0 | Status: SHIPPED | OUTPATIENT
Start: 2021-06-02 | End: 2021-08-02 | Stop reason: SDUPTHER

## 2021-06-04 RX ORDER — SUMATRIPTAN 50 MG/1
TABLET, FILM COATED ORAL
Qty: 9 TABLET | Refills: 0 | OUTPATIENT
Start: 2021-06-04

## 2021-06-13 DIAGNOSIS — E78.2 MIXED HYPERLIPIDEMIA: ICD-10-CM

## 2021-06-13 DIAGNOSIS — F31.81 BIPOLAR 2 DISORDER (HCC): ICD-10-CM

## 2021-06-13 DIAGNOSIS — F51.04 PSYCHOPHYSIOLOGICAL INSOMNIA: ICD-10-CM

## 2021-06-14 RX ORDER — LEVOTHYROXINE SODIUM 0.05 MG/1
TABLET ORAL
Qty: 180 TABLET | Refills: 0 | Status: SHIPPED | OUTPATIENT
Start: 2021-06-14 | End: 2021-08-13

## 2021-06-14 RX ORDER — CYCLOBENZAPRINE HCL 10 MG
TABLET ORAL
Qty: 90 TABLET | Refills: 2 | Status: SHIPPED | OUTPATIENT
Start: 2021-06-14 | End: 2021-09-13

## 2021-06-14 RX ORDER — QUETIAPINE 200 MG/1
TABLET, FILM COATED, EXTENDED RELEASE ORAL
Qty: 180 TABLET | Refills: 0 | Status: SHIPPED | OUTPATIENT
Start: 2021-06-14 | End: 2021-09-13

## 2021-06-14 RX ORDER — LAMOTRIGINE 200 MG/1
TABLET ORAL
Qty: 180 TABLET | Refills: 0 | Status: SHIPPED | OUTPATIENT
Start: 2021-06-14 | End: 2021-09-13

## 2021-06-14 RX ORDER — BUSPIRONE HYDROCHLORIDE 30 MG/1
TABLET ORAL
Qty: 270 TABLET | Refills: 0 | Status: SHIPPED | OUTPATIENT
Start: 2021-06-14 | End: 2021-09-13

## 2021-06-14 RX ORDER — ATORVASTATIN CALCIUM 40 MG/1
TABLET, FILM COATED ORAL
Qty: 90 TABLET | Refills: 0 | Status: SHIPPED | OUTPATIENT
Start: 2021-06-14 | End: 2021-08-13

## 2021-06-15 ENCOUNTER — TELEPHONE (OUTPATIENT)
Dept: ORTHOPEDIC SURGERY | Age: 41
End: 2021-06-15

## 2021-06-15 RX ORDER — SUMATRIPTAN 50 MG/1
TABLET, FILM COATED ORAL
Qty: 9 TABLET | Refills: 2 | Status: SHIPPED | OUTPATIENT
Start: 2021-06-15 | End: 2021-08-16

## 2021-06-15 NOTE — TELEPHONE ENCOUNTER
06/11/2021  [address]  (SERIES OF 3) LEFT  KNEE. [address] [place] [address] Drive CASE #  [id number]. DATES: 05/11/2021 - 07/16/2021. PER FAX FROM [employer]. AP    VERBAL RX GIVEN TO [address] [place] [address] ON 6/8/21. RX CLARIFICATION GIVEN TO [facility] ON 6/14/21    READY TO SCHEDULE. TRYING TO REACH PATIENT FOR (PSA) PERMISSION TO SHIP. THEY SAY THE  WILL SCHEDULE DELIVERY WITH PATIENT TO OFFICE UNLESS WE INFORM HER TO LET US SCHEDULE DELIVERY. WE PREFER TO SCHEDULE IT AND ALWAYS REQUEST SIGNATURE. PATIENT CAN CALL THEM TO EXPEDITE THE PSA PROCESS, P: [phone]/ [facility].

## 2021-06-16 ENCOUNTER — PATIENT MESSAGE (OUTPATIENT)
Dept: FAMILY MEDICINE CLINIC | Age: 41
End: 2021-06-16

## 2021-06-21 ENCOUNTER — PATIENT MESSAGE (OUTPATIENT)
Dept: FAMILY MEDICINE CLINIC | Age: 41
End: 2021-06-21

## 2021-06-22 NOTE — TELEPHONE ENCOUNTER
From: Alda Wlech  To: Nilda Decker MD  Sent: 6/21/2021 7:33 PM EDT  Subject: Prescription Question    Hello! I need a preauth sent to my insurance company for my [address]. I take 3mg daily. The number for [facility] is [phone]    Thank you!

## 2021-06-23 ENCOUNTER — PATIENT MESSAGE (OUTPATIENT)
Dept: FAMILY MEDICINE CLINIC | Age: 41
End: 2021-06-23

## 2021-06-28 RX ORDER — TRIAMCINOLONE ACETONIDE OINTMENT USP, 0.05% 0.5 MG/G
OINTMENT TOPICAL 2 TIMES DAILY
Refills: 0 | Status: CANCELLED | OUTPATIENT
Start: 2021-06-28

## 2021-07-15 NOTE — TELEPHONE ENCOUNTER
07/15/2021  PHARMACY STILL PENDING CONTACT WITH PATIENT. THEY HAVE HAD 6 AUTO  CALLS GO OUT WITH MESSAGE TO CALL BACK P:[phone] TO GIVE CONSENT TO SHIP TO THE OFFICE AND PAY ANY COPAYMENT.  VERBAL SCRIPT EXPIRES IN A COUPLE DAYS AND WILL HAVE TO BE GIVEN AGAIN. AUTHORIZATION IS GOOD TILL 7/16/21.   AP

## 2021-07-23 RX ORDER — LIRAGLUTIDE 6 MG/ML
INJECTION, SOLUTION SUBCUTANEOUS
Qty: 15 ML | Refills: 2 | Status: SHIPPED | OUTPATIENT
Start: 2021-07-23 | End: 2022-01-07

## 2021-08-13 DIAGNOSIS — E78.2 MIXED HYPERLIPIDEMIA: ICD-10-CM

## 2021-08-13 RX ORDER — ATORVASTATIN CALCIUM 40 MG/1
TABLET, FILM COATED ORAL
Qty: 90 TABLET | Refills: 0 | Status: SHIPPED | OUTPATIENT
Start: 2021-08-13 | End: 2022-01-04

## 2021-08-13 RX ORDER — LEVOTHYROXINE SODIUM 0.05 MG/1
TABLET ORAL
Qty: 180 TABLET | Refills: 0 | Status: SHIPPED | OUTPATIENT
Start: 2021-08-13 | End: 2022-01-04

## 2021-08-19 NOTE — TELEPHONE ENCOUNTER
2021  STATUS. ROXANN AUTHORIZATION  ON 21. NEW PA IS REQUIRED AND NEW VERBAL WILL BE REQUIRED FOR [facility].

## 2021-09-12 DIAGNOSIS — F51.04 PSYCHOPHYSIOLOGICAL INSOMNIA: ICD-10-CM

## 2021-09-12 DIAGNOSIS — F31.81 BIPOLAR 2 DISORDER (HCC): ICD-10-CM

## 2021-09-13 RX ORDER — OMEPRAZOLE 40 MG/1
CAPSULE, DELAYED RELEASE ORAL
Qty: 60 CAPSULE | Refills: 0 | Status: SHIPPED | OUTPATIENT
Start: 2021-09-13 | End: 2022-02-23

## 2021-09-13 RX ORDER — CYCLOBENZAPRINE HCL 10 MG
TABLET ORAL
Qty: 90 TABLET | Refills: 0 | Status: SHIPPED | OUTPATIENT
Start: 2021-09-13 | End: 2022-01-07

## 2021-09-13 RX ORDER — LAMOTRIGINE 200 MG/1
TABLET ORAL
Qty: 180 TABLET | Refills: 0 | Status: SHIPPED | OUTPATIENT
Start: 2021-09-13 | End: 2022-01-04

## 2021-09-13 RX ORDER — QUETIAPINE 200 MG/1
TABLET, FILM COATED, EXTENDED RELEASE ORAL
Qty: 180 TABLET | Refills: 0 | Status: SHIPPED | OUTPATIENT
Start: 2021-09-13 | End: 2022-01-07

## 2021-09-13 RX ORDER — BUSPIRONE HYDROCHLORIDE 30 MG/1
TABLET ORAL
Qty: 270 TABLET | Refills: 0 | Status: SHIPPED | OUTPATIENT
Start: 2021-09-13 | End: 2022-01-04

## 2021-10-10 ENCOUNTER — PATIENT MESSAGE (OUTPATIENT)
Dept: FAMILY MEDICINE CLINIC | Age: 41
End: 2021-10-10

## 2021-10-11 NOTE — TELEPHONE ENCOUNTER
From: Erna Hong  To: Marcin Ellis MD  Sent: 10/10/2021 12:52 PM EDT  Subject: Non-Urgent Medical Question    Tomorrow's visit:  I need to discuss solutions for IBS. Can I get my flu shot while I'm there?

## 2021-10-31 ENCOUNTER — TELEPHONE (OUTPATIENT)
Dept: FAMILY MEDICINE CLINIC | Age: 41
End: 2021-10-31

## 2021-10-31 RX ORDER — SULFAMETHOXAZOLE AND TRIMETHOPRIM 800; 160 MG/1; MG/1
1 TABLET ORAL 2 TIMES DAILY
Qty: 10 TABLET | Refills: 0 | Status: SHIPPED | OUTPATIENT
Start: 2021-10-31 | End: 2021-11-05

## 2021-11-28 DIAGNOSIS — F41.9 ANXIETY: ICD-10-CM

## 2021-11-29 RX ORDER — ALPRAZOLAM 2 MG/1
TABLET ORAL
Qty: 90 TABLET | Refills: 0 | Status: SHIPPED | OUTPATIENT
Start: 2021-11-29 | End: 2022-01-07

## 2021-12-15 ENCOUNTER — VIRTUAL VISIT (OUTPATIENT)
Dept: FAMILY MEDICINE CLINIC | Age: 41
End: 2021-12-15
Payer: COMMERCIAL

## 2021-12-15 DIAGNOSIS — F51.04 PSYCHOPHYSIOLOGICAL INSOMNIA: ICD-10-CM

## 2021-12-15 DIAGNOSIS — F31.81 BIPOLAR 2 DISORDER (HCC): Primary | ICD-10-CM

## 2021-12-15 DIAGNOSIS — F41.9 ANXIETY: ICD-10-CM

## 2021-12-15 DIAGNOSIS — F98.8 ATTENTION DEFICIT DISORDER (ADD) WITHOUT HYPERACTIVITY: ICD-10-CM

## 2021-12-15 PROBLEM — S63.502A SPRAIN OF LEFT WRIST: Status: RESOLVED | Noted: 2019-09-23 | Resolved: 2021-12-15

## 2021-12-15 PROCEDURE — 99214 OFFICE O/P EST MOD 30 MIN: CPT | Performed by: FAMILY MEDICINE
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
## 2021-12-30 ENCOUNTER — PATIENT MESSAGE (OUTPATIENT)
Dept: FAMILY MEDICINE CLINIC | Age: 41
End: 2021-12-30

## 2022-01-03 DIAGNOSIS — E78.2 MIXED HYPERLIPIDEMIA: ICD-10-CM

## 2022-01-03 DIAGNOSIS — F31.81 BIPOLAR 2 DISORDER (HCC): ICD-10-CM

## 2022-01-03 NOTE — TELEPHONE ENCOUNTER
From: Denis Astudillo  To: Dr. Jimenez Leisure: 12/30/2021 6:34 PM EST  Subject: Pat Gave duty letter    Hello. 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 For some reason, thats no longer an option?! So I guess Im mentally unable to serve. Would you please send a letter to me regarding all of my issues ;p. You can email it to me at  [email or web address]    Thank you!

## 2022-01-04 RX ORDER — LEVOTHYROXINE SODIUM 0.05 MG/1
TABLET ORAL
Qty: 180 TABLET | Refills: 0 | Status: SHIPPED | OUTPATIENT
Start: 2022-01-04 | End: 2022-03-21

## 2022-01-04 RX ORDER — LAMOTRIGINE 200 MG/1
TABLET ORAL
Qty: 180 TABLET | Refills: 0 | Status: SHIPPED | OUTPATIENT
Start: 2022-01-04 | End: 2022-03-21

## 2022-01-04 RX ORDER — ATORVASTATIN CALCIUM 40 MG/1
TABLET, FILM COATED ORAL
Qty: 90 TABLET | Refills: 0 | Status: SHIPPED | OUTPATIENT
Start: 2022-01-04 | End: 2022-03-21

## 2022-01-04 RX ORDER — BUSPIRONE HYDROCHLORIDE 30 MG/1
TABLET ORAL
Qty: 270 TABLET | Refills: 0 | Status: SHIPPED | OUTPATIENT
Start: 2022-01-04 | End: 2022-04-08

## 2022-01-07 NOTE — TELEPHONE ENCOUNTER
Printed letter out and sent her a message to see if she wanted to pick it up? Or how did she want to get it.

## 2022-01-15 ENCOUNTER — PATIENT MESSAGE (OUTPATIENT)
Dept: FAMILY MEDICINE CLINIC | Age: 42
End: 2022-01-15

## 2022-01-17 RX ORDER — SEMAGLUTIDE 1 MG/.5ML
1 INJECTION, SOLUTION SUBCUTANEOUS
Qty: 2 ML | Refills: 2 | Status: SHIPPED | OUTPATIENT
Start: 2022-01-17 | End: 2022-07-25

## 2022-01-17 NOTE — TELEPHONE ENCOUNTER
From: Janette Astudillo  To: Dr. Veliz Staff: 1/15/2022 9:43 PM EST  Subject: Rustam Minkikoe? I just saw an ad for Rustam Pastor. Ive never heard of it. Im currently taking Saxenda and I dont know that its ever done much for me. What do you think about me switching?

## 2022-02-23 RX ORDER — SUMATRIPTAN 50 MG/1
TABLET, FILM COATED ORAL
Qty: 9 TABLET | Refills: 3 | Status: SHIPPED | OUTPATIENT
Start: 2022-02-23 | End: 2022-03-31

## 2022-02-23 RX ORDER — OMEPRAZOLE 40 MG/1
CAPSULE, DELAYED RELEASE ORAL
Qty: 60 CAPSULE | Refills: 3 | Status: SHIPPED | OUTPATIENT
Start: 2022-02-23

## 2022-02-23 RX ORDER — PROPRANOLOL HCL 60 MG
CAPSULE, EXTENDED RELEASE 24HR ORAL
Qty: 30 CAPSULE | Refills: 3 | Status: SHIPPED | OUTPATIENT
Start: 2022-02-23 | End: 2022-07-01

## 2022-03-18 ENCOUNTER — TELEPHONE (OUTPATIENT)
Dept: ADMINISTRATIVE | Age: 42
End: 2022-03-18

## 2022-03-18 NOTE — TELEPHONE ENCOUNTER
Submitted PA for Wegovy 1MG/0.5ML auto-injectors  Via CMM Key: [id number] STATUS:DENIED. Letter is attached. I'm sorry about this. I don't see this medication routed to the PA department in January. I has been started and expedited. If this requires a response please respond to the pool. [address])    Please advise patient. Thank you.

## 2022-03-19 ENCOUNTER — PATIENT MESSAGE (OUTPATIENT)
Dept: FAMILY MEDICINE CLINIC | Age: 42
End: 2022-03-19

## 2022-03-19 DIAGNOSIS — F98.8 ATTENTION DEFICIT DISORDER (ADD) WITHOUT HYPERACTIVITY: ICD-10-CM

## 2022-03-19 DIAGNOSIS — E78.2 MIXED HYPERLIPIDEMIA: ICD-10-CM

## 2022-03-19 DIAGNOSIS — F31.81 BIPOLAR 2 DISORDER (HCC): ICD-10-CM

## 2022-03-21 ENCOUNTER — PATIENT MESSAGE (OUTPATIENT)
Dept: FAMILY MEDICINE CLINIC | Age: 42
End: 2022-03-21

## 2022-03-21 RX ORDER — METHYLPHENIDATE HYDROCHLORIDE 10 MG/1
TABLET ORAL
Qty: 60 TABLET | Refills: 0 | Status: SHIPPED | OUTPATIENT
Start: 2022-03-21 | End: 2022-04-26 | Stop reason: SDUPTHER

## 2022-03-21 RX ORDER — ATORVASTATIN CALCIUM 40 MG/1
TABLET, FILM COATED ORAL
Qty: 90 TABLET | Refills: 0 | Status: SHIPPED | OUTPATIENT
Start: 2022-03-21 | End: 2022-07-01

## 2022-03-21 RX ORDER — LAMOTRIGINE 200 MG/1
TABLET ORAL
Qty: 180 TABLET | Refills: 0 | Status: SHIPPED | OUTPATIENT
Start: 2022-03-21 | End: 2022-07-14

## 2022-03-21 RX ORDER — LEVOTHYROXINE SODIUM 0.05 MG/1
TABLET ORAL
Qty: 180 TABLET | Refills: 0 | Status: SHIPPED | OUTPATIENT
Start: 2022-03-21 | End: 2022-07-07

## 2022-03-21 NOTE — TELEPHONE ENCOUNTER
From: Reida Leyden Fischer  To: Dr. Cecille Strong: 3/19/2022 6:45 PM EDT  Subject: Ritalin    I need a refill of my Ritalin, please!

## 2022-03-21 NOTE — TELEPHONE ENCOUNTER
From: Hillary Astudillo  To: Dr. Rocky Crowder: 3/21/2022 4:40 PM EDT  Subject: Donny Jarrett    I need needles for my saxenda. Im still trying to get my insurance to pay for wejovy, so far theyre only approving it for once a year, not sure if there anything you can do/say to get them to change that?!?!  For now, I need needles for my saxenda Rx

## 2022-03-31 RX ORDER — SUMATRIPTAN 50 MG/1
TABLET, FILM COATED ORAL
Qty: 9 TABLET | Refills: 3 | Status: SHIPPED | OUTPATIENT
Start: 2022-03-31 | End: 2022-05-23

## 2022-04-07 ENCOUNTER — PATIENT MESSAGE (OUTPATIENT)
Dept: FAMILY MEDICINE CLINIC | Age: 42
End: 2022-04-07

## 2022-04-07 DIAGNOSIS — N64.4 BREAST PAIN, LEFT: Primary | ICD-10-CM

## 2022-04-08 RX ORDER — BUSPIRONE HYDROCHLORIDE 30 MG/1
TABLET ORAL
Qty: 270 TABLET | Refills: 0 | Status: SHIPPED | OUTPATIENT
Start: 2022-04-08 | End: 2022-07-01

## 2022-04-12 ENCOUNTER — HOSPITAL ENCOUNTER (OUTPATIENT)
Dept: WOMENS IMAGING | Age: 42
Discharge: HOME | End: 2022-04-12
Payer: COMMERCIAL

## 2022-04-12 DIAGNOSIS — N64.4 BREAST PAIN, LEFT: ICD-10-CM

## 2022-04-12 PROCEDURE — G0279 TOMOSYNTHESIS, MAMMO: HCPCS

## 2022-04-25 ENCOUNTER — PATIENT MESSAGE (OUTPATIENT)
Dept: FAMILY MEDICINE CLINIC | Age: 42
End: 2022-04-25

## 2022-04-25 DIAGNOSIS — F98.8 ATTENTION DEFICIT DISORDER (ADD) WITHOUT HYPERACTIVITY: ICD-10-CM

## 2022-04-25 RX ORDER — DESVENLAFAXINE 100 MG/1
TABLET, EXTENDED RELEASE ORAL
Qty: 60 TABLET | Refills: 2 | Status: SHIPPED | OUTPATIENT
Start: 2022-04-25 | End: 2022-07-25

## 2022-04-26 RX ORDER — NALTREXONE HYDROCHLORIDE 50 MG/1
TABLET, FILM COATED ORAL
Qty: 45 TABLET | Refills: 1 | Status: SHIPPED | OUTPATIENT
Start: 2022-04-26 | End: 2022-07-25

## 2022-04-26 RX ORDER — METHYLPHENIDATE HYDROCHLORIDE 10 MG/1
TABLET ORAL
Qty: 60 TABLET | Refills: 0 | Status: SHIPPED | OUTPATIENT
Start: 2022-04-26 | End: 2022-05-19 | Stop reason: SDUPTHER

## 2022-04-26 NOTE — TELEPHONE ENCOUNTER
From: Lynda Astudillo  To: Dr. Nicolle James: 4/25/2022 9:29 PM EDT  Subject: Ritalin     I need a refill on my Ritalin please

## 2022-05-18 DIAGNOSIS — F41.9 ANXIETY: ICD-10-CM

## 2022-05-19 DIAGNOSIS — F41.9 ANXIETY: ICD-10-CM

## 2022-05-19 RX ORDER — ALPRAZOLAM 2 MG/1
TABLET ORAL
Qty: 90 TABLET | Refills: 2 | Status: SHIPPED | OUTPATIENT
Start: 2022-05-19 | End: 2022-09-30

## 2022-05-19 RX ORDER — METHYLPHENIDATE HYDROCHLORIDE 10 MG/1
TABLET ORAL
Qty: 60 TABLET | Refills: 0 | Status: SHIPPED | OUTPATIENT
Start: 2022-05-19 | End: 2022-06-23 | Stop reason: SDUPTHER

## 2022-05-19 RX ORDER — ALPRAZOLAM 2 MG/1
TABLET ORAL
Qty: 90 TABLET | Refills: 0 | OUTPATIENT
Start: 2022-05-19 | End: 2022-08-17

## 2022-05-23 RX ORDER — SUMATRIPTAN 50 MG/1
TABLET, FILM COATED ORAL
Qty: 9 TABLET | Refills: 3 | Status: SHIPPED | OUTPATIENT
Start: 2022-05-23 | End: 2022-09-30

## 2022-07-01 DIAGNOSIS — E78.2 MIXED HYPERLIPIDEMIA: ICD-10-CM

## 2022-07-01 RX ORDER — BUSPIRONE HYDROCHLORIDE 30 MG/1
TABLET ORAL
Qty: 270 TABLET | Refills: 0 | Status: SHIPPED | OUTPATIENT
Start: 2022-07-01 | End: 2022-09-30

## 2022-07-01 RX ORDER — PROPRANOLOL HCL 60 MG
CAPSULE, EXTENDED RELEASE 24HR ORAL
Qty: 30 CAPSULE | Refills: 3 | Status: SHIPPED | OUTPATIENT
Start: 2022-07-01 | End: 2022-07-31 | Stop reason: SDUPTHER

## 2022-07-01 RX ORDER — PROPRANOLOL HCL 60 MG
CAPSULE, EXTENDED RELEASE 24HR ORAL
Qty: 30 CAPSULE | Refills: 0 | Status: SHIPPED | OUTPATIENT
Start: 2022-07-01 | End: 2022-07-01

## 2022-07-01 RX ORDER — ATORVASTATIN CALCIUM 40 MG/1
TABLET, FILM COATED ORAL
Qty: 90 TABLET | Refills: 0 | Status: SHIPPED | OUTPATIENT
Start: 2022-07-01 | End: 2022-08-31

## 2022-07-01 NOTE — TELEPHONE ENCOUNTER
Due for follow up with Dr Brittany Gallardo- please call them to schedule at their earliest convenience. (meds have been refilled this time)

## 2022-07-07 RX ORDER — LEVOTHYROXINE SODIUM 0.05 MG/1
TABLET ORAL
Qty: 180 TABLET | Refills: 0 | Status: SHIPPED | OUTPATIENT
Start: 2022-07-07 | End: 2022-07-31 | Stop reason: SDUPTHER

## 2022-07-07 RX ORDER — LEVOTHYROXINE SODIUM 0.05 MG/1
TABLET ORAL
Qty: 180 TABLET | Refills: 0 | OUTPATIENT
Start: 2022-07-07

## 2022-07-13 DIAGNOSIS — F31.81 BIPOLAR 2 DISORDER (HCC): ICD-10-CM

## 2022-07-14 DIAGNOSIS — F31.81 BIPOLAR 2 DISORDER (HCC): ICD-10-CM

## 2022-07-14 RX ORDER — LAMOTRIGINE 200 MG/1
TABLET ORAL
Qty: 180 TABLET | Refills: 0 | Status: SHIPPED | OUTPATIENT
Start: 2022-07-14 | End: 2022-09-09

## 2022-07-14 RX ORDER — LAMOTRIGINE 200 MG/1
TABLET ORAL
Qty: 180 TABLET | Refills: 0 | Status: SHIPPED | OUTPATIENT
Start: 2022-07-14 | End: 2022-07-14

## 2022-07-24 ENCOUNTER — PATIENT MESSAGE (OUTPATIENT)
Dept: FAMILY MEDICINE CLINIC | Age: 42
End: 2022-07-24

## 2022-07-24 DIAGNOSIS — F98.8 ATTENTION DEFICIT DISORDER (ADD) WITHOUT HYPERACTIVITY: ICD-10-CM

## 2022-07-25 ENCOUNTER — OFFICE VISIT (OUTPATIENT)
Dept: FAMILY MEDICINE CLINIC | Age: 42
End: 2022-07-25
Payer: COMMERCIAL

## 2022-07-25 VITALS
RESPIRATION RATE: 18 BRPM | BODY MASS INDEX: 46.98 KG/M2 | SYSTOLIC BLOOD PRESSURE: 128 MMHG | DIASTOLIC BLOOD PRESSURE: 78 MMHG | WEIGHT: 282 LBS | HEART RATE: 106 BPM | HEIGHT: 65 IN | OXYGEN SATURATION: 97 %

## 2022-07-25 DIAGNOSIS — F98.8 ATTENTION DEFICIT DISORDER (ADD) WITHOUT HYPERACTIVITY: ICD-10-CM

## 2022-07-25 DIAGNOSIS — G47.33 OBSTRUCTIVE SLEEP APNEA SYNDROME: ICD-10-CM

## 2022-07-25 DIAGNOSIS — F41.9 ANXIETY: ICD-10-CM

## 2022-07-25 DIAGNOSIS — F31.81 BIPOLAR 2 DISORDER (HCC): Primary | ICD-10-CM

## 2022-07-25 DIAGNOSIS — K58.2 IRRITABLE BOWEL SYNDROME WITH BOTH CONSTIPATION AND DIARRHEA: ICD-10-CM

## 2022-07-25 PROCEDURE — 99215 OFFICE O/P EST HI 40 MIN: CPT | Performed by: FAMILY MEDICINE

## 2022-07-25 RX ORDER — DICYCLOMINE HCL 20 MG
20 TABLET ORAL
Qty: 120 TABLET | Refills: 1 | Status: SHIPPED | OUTPATIENT
Start: 2022-07-25 | End: 2022-10-17

## 2022-07-25 RX ORDER — METHYLPHENIDATE HYDROCHLORIDE 10 MG/1
TABLET ORAL
Qty: 60 TABLET | Refills: 0 | Status: SHIPPED | OUTPATIENT
Start: 2022-07-25 | End: 2022-08-22 | Stop reason: SDUPTHER

## 2022-07-25 RX ORDER — DESVENLAFAXINE 100 MG/1
TABLET, EXTENDED RELEASE ORAL
Qty: 60 TABLET | Refills: 0 | Status: SHIPPED | OUTPATIENT
Start: 2022-07-25 | End: 2022-07-25

## 2022-07-25 RX ORDER — DESVENLAFAXINE 100 MG/1
TABLET, EXTENDED RELEASE ORAL
Qty: 60 TABLET | Refills: 0 | Status: SHIPPED | OUTPATIENT
Start: 2022-07-25 | End: 2022-09-18 | Stop reason: SDUPTHER

## 2022-07-25 ASSESSMENT — PATIENT HEALTH QUESTIONNAIRE - PHQ9
7. TROUBLE CONCENTRATING ON THINGS, SUCH AS READING THE NEWSPAPER OR WATCHING TELEVISION: 3
SUM OF ALL RESPONSES TO PHQ QUESTIONS 1-9: 21
2. FEELING DOWN, DEPRESSED OR HOPELESS: 3
4. FEELING TIRED OR HAVING LITTLE ENERGY: 3
SUM OF ALL RESPONSES TO PHQ QUESTIONS 1-9: 21
SUM OF ALL RESPONSES TO PHQ QUESTIONS 1-9: 21
SUM OF ALL RESPONSES TO PHQ9 QUESTIONS 1 & 2: 6
5. POOR APPETITE OR OVEREATING: 3
9. THOUGHTS THAT YOU WOULD BE BETTER OFF DEAD, OR OF HURTING YOURSELF: 0
3. TROUBLE FALLING OR STAYING ASLEEP: 3
8. MOVING OR SPEAKING SO SLOWLY THAT OTHER PEOPLE COULD HAVE NOTICED. OR THE OPPOSITE, BEING SO FIGETY OR RESTLESS THAT YOU HAVE BEEN MOVING AROUND A LOT MORE THAN USUAL: 0
10. IF YOU CHECKED OFF ANY PROBLEMS, HOW DIFFICULT HAVE THESE PROBLEMS MADE IT FOR YOU TO DO YOUR WORK, TAKE CARE OF THINGS AT HOME, OR GET ALONG WITH OTHER PEOPLE: 3
1. LITTLE INTEREST OR PLEASURE IN DOING THINGS: 3
6. FEELING BAD ABOUT YOURSELF - OR THAT YOU ARE A FAILURE OR HAVE LET YOURSELF OR YOUR FAMILY DOWN: 3
SUM OF ALL RESPONSES TO PHQ QUESTIONS 1-9: 21

## 2022-07-25 ASSESSMENT — COLUMBIA-SUICIDE SEVERITY RATING SCALE - C-SSRS
1. WITHIN THE PAST MONTH, HAVE YOU WISHED YOU WERE DEAD OR WISHED YOU COULD GO TO SLEEP AND NOT WAKE UP?: NO
6. HAVE YOU EVER DONE ANYTHING, STARTED TO DO ANYTHING, OR PREPARED TO DO ANYTHING TO END YOUR LIFE?: NO
2. HAVE YOU ACTUALLY HAD ANY THOUGHTS OF KILLING YOURSELF?: NO
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
## 2022-07-25 NOTE — TELEPHONE ENCOUNTER
From: Ritchie Astudillo  To: Dr. Filiberto Jasso: 7/24/2022 10:13 PM EDT  Subject: Ritalin    I need a refill on my Ritalin please. I didnt see it in the refill list. Ill see you tomorrow afternoon!

## 2022-08-01 RX ORDER — PROPRANOLOL HCL 60 MG
CAPSULE, EXTENDED RELEASE 24HR ORAL
Qty: 30 CAPSULE | Refills: 5 | Status: SHIPPED | OUTPATIENT
Start: 2022-08-01

## 2022-08-01 RX ORDER — LEVOTHYROXINE SODIUM 0.05 MG/1
TABLET ORAL
Qty: 180 TABLET | Refills: 0 | Status: SHIPPED | OUTPATIENT
Start: 2022-08-01

## 2022-08-18 DIAGNOSIS — F51.04 PSYCHOPHYSIOLOGICAL INSOMNIA: ICD-10-CM

## 2022-08-18 RX ORDER — QUETIAPINE 200 MG/1
TABLET, FILM COATED, EXTENDED RELEASE ORAL
Qty: 180 TABLET | Refills: 1 | Status: SHIPPED | OUTPATIENT
Start: 2022-08-18

## 2022-08-18 RX ORDER — QUETIAPINE 200 MG/1
TABLET, FILM COATED, EXTENDED RELEASE ORAL
Qty: 180 TABLET | Refills: 0 | OUTPATIENT
Start: 2022-08-18

## 2022-08-22 ENCOUNTER — PATIENT MESSAGE (OUTPATIENT)
Dept: FAMILY MEDICINE CLINIC | Age: 42
End: 2022-08-22

## 2022-08-22 DIAGNOSIS — F98.8 ATTENTION DEFICIT DISORDER (ADD) WITHOUT HYPERACTIVITY: ICD-10-CM

## 2022-08-22 RX ORDER — FLUCONAZOLE 150 MG/1
150 TABLET ORAL ONCE
Qty: 2 TABLET | Refills: 0 | Status: SHIPPED | OUTPATIENT
Start: 2022-08-22 | End: 2022-08-22

## 2022-08-23 RX ORDER — METHYLPHENIDATE HYDROCHLORIDE 10 MG/1
TABLET ORAL
Qty: 60 TABLET | Refills: 0 | Status: SHIPPED | OUTPATIENT
Start: 2022-08-23 | End: 2022-09-18 | Stop reason: SDUPTHER

## 2022-08-30 DIAGNOSIS — E78.2 MIXED HYPERLIPIDEMIA: ICD-10-CM

## 2022-08-31 RX ORDER — ATORVASTATIN CALCIUM 40 MG/1
TABLET, FILM COATED ORAL
Qty: 90 TABLET | Refills: 1 | Status: SHIPPED | OUTPATIENT
Start: 2022-08-31

## 2022-09-04 ENCOUNTER — PATIENT MESSAGE (OUTPATIENT)
Dept: FAMILY MEDICINE CLINIC | Age: 42
End: 2022-09-04

## 2022-09-06 NOTE — TELEPHONE ENCOUNTER
From: Valeria Astudillo  To: Dr. Greene Peter: 9/4/2022 7:28 PM EDT  Subject: Ellwood Nidia    Could we see if my insurance would pay for Ellwood Nidia? Its another weight loss injectable.  The one that worked so well (cant remember the name), yet will only authorize once a year for now

## 2022-09-08 DIAGNOSIS — F31.81 BIPOLAR 2 DISORDER (HCC): ICD-10-CM

## 2022-09-09 RX ORDER — LAMOTRIGINE 200 MG/1
TABLET ORAL
Qty: 180 TABLET | Refills: 0 | Status: SHIPPED | OUTPATIENT
Start: 2022-09-09 | End: 2022-10-14

## 2022-09-12 ENCOUNTER — PATIENT MESSAGE (OUTPATIENT)
Dept: FAMILY MEDICINE CLINIC | Age: 42
End: 2022-09-12

## 2022-09-12 NOTE — TELEPHONE ENCOUNTER
From: Ritchie Astudillo  To: Dr. Filiberto Jasso: 9/12/2022 1:23 PM EDT  Subject: Weird neck pain     Hello! So Im assuming this is because of the stress of life, butIm have severe pain in my muscles that goes from the base of my skull down through my shoulder blades. Its stiff but also hurts to the touch, like its bruised. Should I be concerned?

## 2022-09-13 ENCOUNTER — OFFICE VISIT (OUTPATIENT)
Dept: FAMILY MEDICINE CLINIC | Age: 42
End: 2022-09-13
Payer: COMMERCIAL

## 2022-09-13 VITALS
RESPIRATION RATE: 16 BRPM | SYSTOLIC BLOOD PRESSURE: 116 MMHG | HEIGHT: 65 IN | HEART RATE: 94 BPM | OXYGEN SATURATION: 97 % | BODY MASS INDEX: 47.65 KG/M2 | DIASTOLIC BLOOD PRESSURE: 74 MMHG | TEMPERATURE: 98.1 F | WEIGHT: 286 LBS

## 2022-09-13 DIAGNOSIS — M79.10 MYALGIA: Primary | ICD-10-CM

## 2022-09-13 DIAGNOSIS — F31.81 BIPOLAR 2 DISORDER (HCC): ICD-10-CM

## 2022-09-13 DIAGNOSIS — M79.10 MYALGIA: ICD-10-CM

## 2022-09-13 DIAGNOSIS — M25.50 MULTIPLE JOINT PAIN: ICD-10-CM

## 2022-09-13 LAB
A/G RATIO: 1.7 (ref 1.1–2.2)
ALBUMIN SERPL-MCNC: 4 G/DL (ref 3.4–5)
ALP BLD-CCNC: 93 U/L (ref 40–129)
ALT SERPL-CCNC: 23 U/L (ref 10–40)
ANION GAP SERPL CALCULATED.3IONS-SCNC: 13 MMOL/L (ref 3–16)
AST SERPL-CCNC: 17 U/L (ref 15–37)
BASOPHILS ABSOLUTE: 0.1 K/UL (ref 0–0.2)
BASOPHILS RELATIVE PERCENT: 1.1 %
BILIRUB SERPL-MCNC: <0.2 MG/DL (ref 0–1)
BUN BLDV-MCNC: 12 MG/DL (ref 7–20)
C-REACTIVE PROTEIN: 7.5 MG/L (ref 0–5.1)
CALCIUM SERPL-MCNC: 9.2 MG/DL (ref 8.3–10.6)
CHLORIDE BLD-SCNC: 107 MMOL/L (ref 99–110)
CO2: 24 MMOL/L (ref 21–32)
CREAT SERPL-MCNC: 0.8 MG/DL (ref 0.6–1.1)
EOSINOPHILS ABSOLUTE: 0.2 K/UL (ref 0–0.6)
EOSINOPHILS RELATIVE PERCENT: 2.8 %
GFR AFRICAN AMERICAN: >60
GFR NON-AFRICAN AMERICAN: >60
GLUCOSE BLD-MCNC: 91 MG/DL (ref 70–99)
HCT VFR BLD CALC: 41 % (ref 36–48)
HEMOGLOBIN: 14 G/DL (ref 12–16)
LYMPHOCYTES ABSOLUTE: 2.2 K/UL (ref 1–5.1)
LYMPHOCYTES RELATIVE PERCENT: 26.7 %
MCH RBC QN AUTO: 30.4 PG (ref 26–34)
MCHC RBC AUTO-ENTMCNC: 34.2 G/DL (ref 31–36)
MCV RBC AUTO: 88.7 FL (ref 80–100)
MONOCYTES ABSOLUTE: 0.5 K/UL (ref 0–1.3)
MONOCYTES RELATIVE PERCENT: 5.5 %
NEUTROPHILS ABSOLUTE: 5.4 K/UL (ref 1.7–7.7)
NEUTROPHILS RELATIVE PERCENT: 63.9 %
PDW BLD-RTO: 13.8 % (ref 12.4–15.4)
PLATELET # BLD: 329 K/UL (ref 135–450)
PMV BLD AUTO: 7.3 FL (ref 5–10.5)
POTASSIUM SERPL-SCNC: 4.9 MMOL/L (ref 3.5–5.1)
RBC # BLD: 4.62 M/UL (ref 4–5.2)
SEDIMENTATION RATE, ERYTHROCYTE: 8 MM/HR (ref 0–20)
SODIUM BLD-SCNC: 144 MMOL/L (ref 136–145)
TOTAL PROTEIN: 6.4 G/DL (ref 6.4–8.2)
TSH REFLEX: 0.97 UIU/ML (ref 0.27–4.2)
VITAMIN D 25-HYDROXY: 43 NG/ML
WBC # BLD: 8.4 K/UL (ref 4–11)

## 2022-09-13 PROCEDURE — 99214 OFFICE O/P EST MOD 30 MIN: CPT | Performed by: NURSE PRACTITIONER

## 2022-09-13 RX ORDER — FREMANEZUMAB-VFRM 225 MG/1.5ML
INJECTION SUBCUTANEOUS
COMMUNITY
Start: 2022-08-30

## 2022-09-13 RX ORDER — PROGESTERONE 200 MG/1
CAPSULE ORAL
COMMUNITY
Start: 2022-08-30

## 2022-09-13 SDOH — ECONOMIC STABILITY: FOOD INSECURITY: WITHIN THE PAST 12 MONTHS, THE FOOD YOU BOUGHT JUST DIDN'T LAST AND YOU DIDN'T HAVE MONEY TO GET MORE.: NEVER TRUE

## 2022-09-13 SDOH — ECONOMIC STABILITY: FOOD INSECURITY: WITHIN THE PAST 12 MONTHS, YOU WORRIED THAT YOUR FOOD WOULD RUN OUT BEFORE YOU GOT MONEY TO BUY MORE.: NEVER TRUE

## 2022-09-13 ASSESSMENT — SOCIAL DETERMINANTS OF HEALTH (SDOH): HOW HARD IS IT FOR YOU TO PAY FOR THE VERY BASICS LIKE FOOD, HOUSING, MEDICAL CARE, AND HEATING?: SOMEWHAT HARD

## 2022-09-13 ASSESSMENT — ENCOUNTER SYMPTOMS
RHINORRHEA: 0
COUGH: 0

## 2022-09-13 NOTE — PROGRESS NOTES
9/13/2022    This is a 38 y.o. female   Chief Complaint   Patient presents with    Generalized Body Aches     Started 2 days ago. Worse yesterday. Much better today. No other symptoms. Took home covid test and was negative. Painful to just be touched. Has history of fibromyalgia. Logan Jose HPI  Worst pain reported yesterday 9/12- generalized pain, myalgia, and arthralgia. Pt reports sensitivity on scalp,  from seatbelt, and elastic part of sports bra. Pain started Sunday 9/11. Reports \"muscles felt bruised\". Today not debilitating, but wondering if it'll get worse throughout the day. Pt has a history of Fibromyalgia. Pt saw rheumatologist in past. Pt experiences constant back and neck pain daily. Pain has never been as bad as yesterday. Epsom salt bath, naps, and foot rubs alleviates pain. Pt has tried accupuncture in the past and has helped s/s. Gastric sleeve surgery in 2018- avoids NSAIDS. No recent trauma noted. Pt's daughter was sick last Thursday and is recovered now. Denies cough, rhinorrhea, fever. At home Covid test negative last night. Patient's father in law has been in hospital. Mother in law recently passed. Takes multiple medication for mood and fibromyalgia, which is managed by her PCP Dr. Thanh Lu. Reports \"mood is always the same\". Pt has done counseling in the past, but psychiatrist was not responsive so stopped going. Pt has an appointment on 9/28 with Neuroscience doctor, Dr. Kelly Gavin for fibromyalgia.       Patient Active Problem List   Diagnosis    Allergy to eggs    Vitamin D deficiency    PMS (premenstrual syndrome)    Hypothyroidism    PCOD (polycystic ovarian disease)    Anxiety    ADD (attention deficit disorder)    Bipolar 2 disorder (HCC)    Family history of breast cancer (mother, age 36)    Asthma    Insomnia    Allergic rhinitis    Chronic constipation    Hyperlipidemia, unspecified    NAFLD (nonalcoholic fatty liver disease)    Chronic GERD    Migraine    Morbid nervous/anxious. Vitals:    09/13/22 0928   BP: 116/74   Site: Right Lower Arm   Position: Sitting   Cuff Size: Large Adult   Pulse: 94   Resp: 16   Temp: 98.1 °F (36.7 °C)   TempSrc: Oral   SpO2: 97%   Weight: 286 lb (129.7 kg)   Height: 5' 5\" (1.651 m)       Body mass index is 47.59 kg/m². Wt Readings from Last 3 Encounters:   09/13/22 286 lb (129.7 kg)   07/25/22 282 lb (127.9 kg)   10/15/20 277 lb (125.6 kg)       BP Readings from Last 3 Encounters:   09/13/22 116/74   07/25/22 128/78   08/26/20 121/74       Physical Exam  Constitutional:       Appearance: Normal appearance. She is obese. HENT:      Head: Normocephalic. Cardiovascular:      Rate and Rhythm: Normal rate and regular rhythm. Pulses: Normal pulses. Heart sounds: Normal heart sounds. Pulmonary:      Effort: Pulmonary effort is normal.      Breath sounds: Normal breath sounds. Musculoskeletal:      Cervical back: Tenderness present. Skin:     General: Skin is warm and dry. Neurological:      General: No focal deficit present. Mental Status: She is alert and oriented to person, place, and time. Mitra Jerez Dr was seen today for generalized body aches. Diagnoses and all orders for this visit:    Myalgia/ Multiple joint pain  -     Comprehensive Metabolic Panel; Future  -     Vitamin D 25 Hydroxy; Future  -     CBC with Auto Differential; Future  -     TSH with Reflex; Future  -     Sedimentation rate, automated; Future  -     C-Reactive Protein; Future  -Pt's daughter recently ill and suspect that her increased symptoms were r/t her fighting off a viral infection.   - At home Covid test negative  -Advised to rest and hydrate with water and work on healthy diet. Patient is to let me know if symptoms worsen or do not continue to improve. She does have history of fibromyalgia and is planning on having evaluation with PM&R Dr. Irasema Hendrickson on 9/28/22.      Bipolar disorder   Patient reports that mood is stable. Always could improve. Advised to start counseling and she will consider. Discussed that worsening mood can cause worsening pain symptoms. She works closely with her PCP Dr. Kingsley Rubio. Continue current medications. Return if symptoms worsen or fail to improve. Should keep f/u with Dr. Kingsley Rubio that is scheduled for 10/25/22.      Pt seen with LEILANI Longoria

## 2022-09-13 NOTE — PATIENT INSTRUCTIONS
[place], [address]  [address]ash Melyssa  Phone [phone]    [facility]- multiple locations  Phone [phone]- multiple locations  Phone [phone]     Fax  Christine [address]  [address] [facility], [address] [facility]  Phone [phone]   Fax Vivian Opus 420  [id number] [facility],[address]   [place], [address]  Phone [phone]

## 2022-09-18 DIAGNOSIS — F98.8 ATTENTION DEFICIT DISORDER (ADD) WITHOUT HYPERACTIVITY: ICD-10-CM

## 2022-09-19 RX ORDER — DESVENLAFAXINE 100 MG/1
TABLET, EXTENDED RELEASE ORAL
Qty: 60 TABLET | Refills: 5 | Status: SHIPPED | OUTPATIENT
Start: 2022-09-19

## 2022-09-19 RX ORDER — METHYLPHENIDATE HYDROCHLORIDE 10 MG/1
TABLET ORAL
Qty: 60 TABLET | Refills: 0 | Status: SHIPPED | OUTPATIENT
Start: 2022-09-19 | End: 2022-10-19 | Stop reason: SDUPTHER

## 2022-09-30 DIAGNOSIS — F41.9 ANXIETY: ICD-10-CM

## 2022-09-30 RX ORDER — BUSPIRONE HYDROCHLORIDE 30 MG/1
TABLET ORAL
Qty: 270 TABLET | Refills: 0 | Status: SHIPPED | OUTPATIENT
Start: 2022-09-30

## 2022-09-30 RX ORDER — SUMATRIPTAN 50 MG/1
TABLET, FILM COATED ORAL
Qty: 9 TABLET | Refills: 0 | Status: SHIPPED | OUTPATIENT
Start: 2022-09-30 | End: 2022-10-03

## 2022-09-30 RX ORDER — ALPRAZOLAM 2 MG/1
TABLET ORAL
Qty: 90 TABLET | Refills: 2 | Status: SHIPPED | OUTPATIENT
Start: 2022-09-30 | End: 2022-12-29

## 2022-09-30 RX ORDER — CYCLOBENZAPRINE HCL 10 MG
TABLET ORAL
Qty: 90 TABLET | Refills: 0 | Status: SHIPPED | OUTPATIENT
Start: 2022-09-30 | End: 2022-10-03

## 2022-10-03 RX ORDER — SUMATRIPTAN 50 MG/1
TABLET, FILM COATED ORAL
Qty: 9 TABLET | Refills: 2 | Status: SHIPPED | OUTPATIENT
Start: 2022-10-03

## 2022-10-03 RX ORDER — CYCLOBENZAPRINE HCL 10 MG
TABLET ORAL
Qty: 90 TABLET | Refills: 2 | Status: SHIPPED | OUTPATIENT
Start: 2022-10-03

## 2022-10-14 DIAGNOSIS — F31.81 BIPOLAR 2 DISORDER (HCC): ICD-10-CM

## 2022-10-14 RX ORDER — LAMOTRIGINE 200 MG/1
TABLET ORAL
Qty: 180 TABLET | Refills: 0 | Status: SHIPPED | OUTPATIENT
Start: 2022-10-14

## 2022-10-17 RX ORDER — DICYCLOMINE HCL 20 MG
20 TABLET ORAL
Qty: 120 TABLET | Refills: 0 | Status: SHIPPED | OUTPATIENT
Start: 2022-10-17

## 2022-10-19 ENCOUNTER — PATIENT MESSAGE (OUTPATIENT)
Dept: FAMILY MEDICINE CLINIC | Age: 42
End: 2022-10-19

## 2022-10-19 DIAGNOSIS — F98.8 ATTENTION DEFICIT DISORDER (ADD) WITHOUT HYPERACTIVITY: ICD-10-CM

## 2022-10-19 RX ORDER — METHYLPHENIDATE HYDROCHLORIDE 10 MG/1
TABLET ORAL
Qty: 60 TABLET | Refills: 0 | Status: SHIPPED | OUTPATIENT
Start: 2022-10-19 | End: 2022-11-16

## 2022-10-19 NOTE — TELEPHONE ENCOUNTER
From: Christin Astudillo  To: Dr. Herrera Geovanni: 10/19/2022 11:17 AM EDT  Subject: Ritalin    I need a refill on my Ritalin please.

## 2022-11-07 RX ORDER — OMEPRAZOLE 40 MG/1
CAPSULE, DELAYED RELEASE ORAL
Qty: 60 CAPSULE | Refills: 5 | Status: SHIPPED | OUTPATIENT
Start: 2022-11-07

## 2022-11-07 RX ORDER — SEMAGLUTIDE 2.4 MG/.75ML
2.4 INJECTION, SOLUTION SUBCUTANEOUS
Qty: 3 ML | Refills: 5 | Status: SHIPPED | OUTPATIENT
Start: 2022-11-07

## 2022-11-14 RX ORDER — DICYCLOMINE HCL 20 MG
TABLET ORAL
Qty: 120 TABLET | Refills: 2 | Status: SHIPPED | OUTPATIENT
Start: 2022-11-14

## 2022-11-19 ENCOUNTER — PATIENT MESSAGE (OUTPATIENT)
Dept: FAMILY MEDICINE CLINIC | Age: 42
End: 2022-11-19

## 2022-11-19 DIAGNOSIS — F98.8 ATTENTION DEFICIT DISORDER (ADD) WITHOUT HYPERACTIVITY: ICD-10-CM

## 2022-11-21 RX ORDER — LEVOTHYROXINE SODIUM 0.05 MG/1
TABLET ORAL
Qty: 180 TABLET | Refills: 0 | Status: SHIPPED | OUTPATIENT
Start: 2022-11-21

## 2022-11-21 RX ORDER — METHYLPHENIDATE HYDROCHLORIDE 10 MG/1
TABLET ORAL
Qty: 60 TABLET | Refills: 0 | Status: SHIPPED | OUTPATIENT
Start: 2022-11-21 | End: 2022-12-19

## 2022-11-21 NOTE — TELEPHONE ENCOUNTER
From: Rupa Astudillo  To: Dr. Pinkie Barthel: 11/19/2022 1:14 PM EST  Subject: Ritalin     Hello. I need a refill on my Ritalin please! Unna Boot Text: An Unna boot was placed to help immobilize the limb and facilitate more rapid healing.

## 2022-12-01 ENCOUNTER — OFFICE VISIT (OUTPATIENT)
Dept: ORTHOPEDIC SURGERY | Age: 42
End: 2022-12-01

## 2022-12-01 VITALS — BODY MASS INDEX: 47.65 KG/M2 | RESPIRATION RATE: 16 BRPM | WEIGHT: 286 LBS | HEIGHT: 65 IN

## 2022-12-01 DIAGNOSIS — M75.02 ADHESIVE CAPSULITIS OF LEFT SHOULDER: ICD-10-CM

## 2022-12-01 DIAGNOSIS — M25.512 ACUTE PAIN OF LEFT SHOULDER: Primary | ICD-10-CM

## 2022-12-01 SDOH — HEALTH STABILITY: PHYSICAL HEALTH: ON AVERAGE, HOW MANY MINUTES DO YOU ENGAGE IN EXERCISE AT THIS LEVEL?: 10 MIN

## 2022-12-01 SDOH — HEALTH STABILITY: PHYSICAL HEALTH: ON AVERAGE, HOW MANY DAYS PER WEEK DO YOU ENGAGE IN MODERATE TO STRENUOUS EXERCISE (LIKE A BRISK WALK)?: 1 DAY

## 2022-12-02 NOTE — PROGRESS NOTES
ORTHOPAEDIC PROGRESS NOTE    Chief Complaint   Patient presents with    Shoulder Pain     Left shoulder pain          Shoulder Pain      12/1/22  Kristian Zapata returns to clinic today for new problem: Left shoulder pain  She reports her left shoulder started hurting approximately 3 months ago  There is no injury or trauma  She reports moving the left arm/shoulder hurts  She has difficulty with reaching out to the side, abduction, reaching behind  She cannot sleep on it  The pain is described superolateral  There is no radiation  She is not a diabetic  She does have thyroid issues      10/15/2020  Returns for left knee  Left knee has been hurting more recently  Worse with stairs  Pain is mainly anteromedial  Pain is rated 7/10      12/6/2019  Returns for left knee  Steroid injection in September helped until 2 weeks ago  Pain now 9/10  No new injury  Pain remains mainly anterolateral/patellofemoral      9/20/2019  Returns for left knee  Since I last saw her, she underwent bariatric surgery with Dr Mj Glover  Has lost ~70 lbs, doing well  She reports with the weight loss, her knees have felt better  She was doing boot camp, and noticed increased medial sided left knee pain earlier this week  No injury per se, she thinks from overuse  Has tried some taping, which she thinks has helped      12/29/2016  synvisc in left knee last week worked well  Here for right knee  Pain is diffuse, but mainly anterior and lateral  Pain with activity  Denies N/T    Vitals:    12/01/22 1414   Resp: 16   Weight: 286 lb (129.7 kg)   Height: 5' 5\" (1.651 m)       Physical Exam  Body mass index is 47.59 kg/m². NAD, pleasant  LUE SILT M/U/R/A/LABC nerve distributions; AIN/PIN/IO intact  Left shoulder:   No obvious deformity/swelling/ecchymosis   No atrophy     Range of Motion:     Forward flexion/scaption:  140    Abduction:  150    External rotation with arm at side:  25 (right shoulder 75) with severe pain    Internal rotation:  back pocket   Strength:    Abduction:  5/5     External rotation:  5/5    Negative drop arm test   No lag sign      Imaging:  Left shoulder 4 views performed 12/1/22 - glenohumeral articular height is preserved, there are no loose bodies appreciated. Jose's line is preserved. On axillary view, the humeral head is well-centered within the glenoid. The acromioclavicular joint demonstrates mild degenerative changes. The tuberosities are normal in appearance. Left knee 4 views performed previously   - Overall alignment is neutral.    The medial compartment is mildly narrowed with large osteophytes seen. The lateral compartment is moderately narrowed with large osteophytes seen. The anterior compartment is mildly narrowed with large osteophytes seen. The patella is well-centered within the trochlear groove. There are no loose bodies appreciated. There is evidence of tibiofemoral subluxation consistent with her arthritis. Assessment & Plan:  38 y.o. female following up for   Diagnosis Orders   1. Acute pain of left shoulder  XR SHOULDER LEFT (MIN 2 VIEWS)    20610 - MT DRAIN/INJECT LARGE JOINT/BURSA    MT TRIAMCINOLONE ACETONIDE INJ      2. Adhesive capsulitis of left shoulder  [facility]          Discussed development of frozen shoulder and pathoanatomy. Informed the patient it is more common in females, age 44-60s, and associated with patients with endocrinopathy such as DM and thyroid disease. In cases associated with diabetes, disease is typically more aggressive, and more refractory to both conservative and surgical treatment. Can also be associated with cervical disc disease. Informed the patient it can be a long process, and to not expect a quick resolution. Clinical stages of pain, stiffness, and thawing can extend over a year.   Majority of patients respond favorably to conservative treatment, however, there is a subset of refractory patients who end up requiring surgical management. Avoid aggravating activities and positions. Avoid heavy lifting, avoid lifting away from body, avoid lifting overhead. Keep most activities between chest and waist level. If you have to lift, keep arms/elbows next to your body/torso. Sleep with arms/shoulder in adducted position. Inflammation/pain management (ice, NSAIDs, intraarticular corticosteroid injections) to allow meaningful participation in physical therapy and home stretching program; PT referral placed and HEP printed out. Sometimes multiple injections are required to break the inflammation. Risks and benefits of a steroid injection were discussed with the patient, including the possibility of adverse local site reactions such as dermal atrophy and skin discoloration. Although rare, an infection is possible and may necessitate surgical treatment if it occurs in a joint or develops into an abscess. Finally, a rise in blood sugar levels is anticipated, particularly in diabetics. Diabetic patients were instructed to monitor their blood glucose levels after the injection and to adjust their insulin regimen as appropriate. The patient elected to proceed, and after verbal consent was obtained and drug allergies were reviewed, the injection site was prepped with alcohol and ChloraPrep. 40mg of Kenalog mixed with lidocaine and marcaine (no epinephrine) was injected into the left shoulder (intra-articular). There were no immediate complications after the procedure. The patient was advised to ice the area intermittently over the next 24-48 hours until the corticosteroid becomes effective. Follow-up in 6-8 weeks to check progress.       Dustin Mosqueda MD

## 2022-12-06 RX ORDER — BUSPIRONE HYDROCHLORIDE 30 MG/1
TABLET ORAL
Qty: 270 TABLET | Refills: 0 | Status: SHIPPED | OUTPATIENT
Start: 2022-12-06

## 2022-12-07 RX ORDER — BUSPIRONE HYDROCHLORIDE 30 MG/1
TABLET ORAL
Qty: 270 TABLET | Refills: 0 | OUTPATIENT
Start: 2022-12-07

## 2022-12-20 RX ORDER — CLONIDINE HYDROCHLORIDE 0.1 MG/1
0.1 TABLET ORAL 2 TIMES DAILY
Qty: 60 TABLET | Refills: 3 | Status: SHIPPED | OUTPATIENT
Start: 2022-12-20

## 2023-01-13 DIAGNOSIS — M79.602 LEFT ARM PAIN: ICD-10-CM
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
## 2023-01-13 DIAGNOSIS — M54.50 RECURRENT LOW BACK PAIN: Primary | ICD-10-CM

## 2023-01-13 RX ORDER — OXYCODONE HYDROCHLORIDE AND ACETAMINOPHEN 5; 325 MG/1; MG/1
1 TABLET ORAL EVERY 6 HOURS PRN
Qty: 15 TABLET | Refills: 0 | Status: SHIPPED | OUTPATIENT
Start: 2023-01-13 | End: 2023-01-16

## 2023-01-16 ENCOUNTER — PATIENT MESSAGE (OUTPATIENT)
Dept: FAMILY MEDICINE CLINIC | Age: 43
End: 2023-01-16

## 2023-01-16 DIAGNOSIS — F98.8 ATTENTION DEFICIT DISORDER (ADD) WITHOUT HYPERACTIVITY: ICD-10-CM

## 2023-01-16 RX ORDER — METHYLPHENIDATE HYDROCHLORIDE 10 MG/1
TABLET ORAL
Qty: 60 TABLET | Refills: 0 | Status: SHIPPED | OUTPATIENT
Start: 2023-01-16 | End: 2023-02-13

## 2023-01-16 RX ORDER — LEVOTHYROXINE SODIUM 0.05 MG/1
TABLET ORAL
Qty: 180 TABLET | Refills: 1 | Status: SHIPPED | OUTPATIENT
Start: 2023-01-16

## 2023-01-16 NOTE — TELEPHONE ENCOUNTER
From: Marcela Astudillo  To: Dr. Venkata Maria: 1/16/2023 12:05 AM EST  Subject: Ritalin refill    Hello! I need a refill on my Ritalin, please    The pain pills have helped me so much this weekend! Im still in some pain but Im able to be up and moving! Chiropractor tomorrow! Thank you!

## 2023-02-02 DIAGNOSIS — F41.9 ANXIETY: ICD-10-CM

## 2023-02-02 RX ORDER — ALPRAZOLAM 2 MG/1
TABLET ORAL
Qty: 90 TABLET | Refills: 2 | Status: SHIPPED | OUTPATIENT
Start: 2023-02-02 | End: 2023-03-27

## 2023-02-09 ENCOUNTER — PATIENT MESSAGE (OUTPATIENT)
Dept: FAMILY MEDICINE CLINIC | Age: 43
End: 2023-02-09

## 2023-02-09 DIAGNOSIS — F51.04 PSYCHOPHYSIOLOGICAL INSOMNIA: ICD-10-CM

## 2023-02-09 DIAGNOSIS — F98.8 ATTENTION DEFICIT DISORDER (ADD) WITHOUT HYPERACTIVITY: ICD-10-CM

## 2023-02-09 RX ORDER — METHYLPHENIDATE HYDROCHLORIDE 10 MG/1
TABLET ORAL
Qty: 60 TABLET | Refills: 0 | Status: SHIPPED | OUTPATIENT
Start: 2023-02-14 | End: 2023-03-16

## 2023-02-09 RX ORDER — QUETIAPINE 200 MG/1
TABLET, FILM COATED, EXTENDED RELEASE ORAL
Qty: 180 TABLET | Refills: 0 | Status: SHIPPED | OUTPATIENT
Start: 2023-02-09

## 2023-02-10 NOTE — TELEPHONE ENCOUNTER
From: Corbin Astudillo  To: Dr. Castro Million: 2/9/2023 11:16 AM EST  Subject: Ritalin    I need a refill of my Ritalin. I got the message about needing to come in to see you. Life is super hectic, trying to get my in-laws house ready to move in so we can sell our house! Ill come in soon! Thank you!

## 2023-03-02 ENCOUNTER — TELEPHONE (OUTPATIENT)
Dept: ORTHOPEDIC SURGERY | Age: 43
End: 2023-03-02

## 2023-03-03 NOTE — TELEPHONE ENCOUNTER
Called and spoke with patient -- advised her every 4 months for shoulder injections -- and we have to submit for precert for VISCO inj

## 2023-03-09 NOTE — TELEPHONE ENCOUNTER
From: Hugo Aguiar  To: Candi Valdez MD  Sent: 3/23/2018 10:47 AM EDT  Subject: Non-Urgent Medical Question    Hello! I had my 2nd appointment with Dr. HOME CYR yesterday. He realized that I have (undocumented) terrible gastro peresis. Before he'll even consider doing a bariatric surgery on me, he is going to do a Pyloroplasty on April 6th. So when we come in to see you, I'll need to get clearance for that surgery. AND - I NEED A REFILL ON MY RITALIN , PLEASE!
Warm

## 2023-03-20 RX ORDER — PROPRANOLOL HCL 60 MG
CAPSULE, EXTENDED RELEASE 24HR ORAL
Qty: 90 CAPSULE | Refills: 1 | Status: SHIPPED | OUTPATIENT
Start: 2023-03-20 | End: 2023-04-14 | Stop reason: SDUPTHER

## 2023-03-25 DIAGNOSIS — F41.9 ANXIETY: ICD-10-CM

## 2023-03-27 RX ORDER — ALPRAZOLAM 2 MG/1
TABLET ORAL
Qty: 90 TABLET | Refills: 2 | Status: SHIPPED | OUTPATIENT
Start: 2023-03-27 | End: 2023-04-14 | Stop reason: SDUPTHER

## 2023-03-28 ENCOUNTER — PATIENT MESSAGE (OUTPATIENT)
Dept: FAMILY MEDICINE CLINIC | Age: 43
End: 2023-03-28

## 2023-03-28 DIAGNOSIS — F41.9 ANXIETY: ICD-10-CM

## 2023-03-28 DIAGNOSIS — F98.8 ATTENTION DEFICIT DISORDER (ADD) WITHOUT HYPERACTIVITY: ICD-10-CM

## 2023-03-29 RX ORDER — METHYLPHENIDATE HYDROCHLORIDE 10 MG/1
TABLET ORAL
Qty: 60 TABLET | Refills: 0 | Status: SHIPPED | OUTPATIENT
Start: 2023-03-29 | End: 2023-04-28

## 2023-03-29 RX ORDER — ALPRAZOLAM 2 MG/1
TABLET ORAL
Qty: 90 TABLET | Refills: 2 | OUTPATIENT
Start: 2023-03-29 | End: 2023-06-27

## 2023-03-29 NOTE — TELEPHONE ENCOUNTER
From: Jabari Astudillo  To: Dr. Will Li: 3/28/2023 8:51 PM EDT  Subject: Ritalin     I need a refill on my Ritalin please. Im scheduled to see you next Tuesday but only have 2 pills left. Thank you!

## 2023-03-30 RX ORDER — DESVENLAFAXINE 100 MG/1
TABLET, EXTENDED RELEASE ORAL
Qty: 60 TABLET | Refills: 5 | Status: SHIPPED | OUTPATIENT
Start: 2023-03-30 | End: 2023-05-01 | Stop reason: SDUPTHER

## 2023-04-04 ENCOUNTER — OFFICE VISIT (OUTPATIENT)
Dept: FAMILY MEDICINE CLINIC | Age: 43
End: 2023-04-04
Payer: COMMERCIAL

## 2023-04-04 VITALS
HEIGHT: 65 IN | SYSTOLIC BLOOD PRESSURE: 128 MMHG | DIASTOLIC BLOOD PRESSURE: 76 MMHG | HEART RATE: 95 BPM | OXYGEN SATURATION: 97 % | RESPIRATION RATE: 20 BRPM | BODY MASS INDEX: 45.55 KG/M2 | WEIGHT: 273.4 LBS

## 2023-04-04 DIAGNOSIS — E66.9 DIABETES MELLITUS TYPE 2 IN OBESE (HCC): ICD-10-CM

## 2023-04-04 DIAGNOSIS — E11.69 DIABETES MELLITUS TYPE 2 IN OBESE (HCC): ICD-10-CM

## 2023-04-04 DIAGNOSIS — E78.5 HYPERLIPIDEMIA, UNSPECIFIED HYPERLIPIDEMIA TYPE: ICD-10-CM

## 2023-04-04 DIAGNOSIS — F41.9 ANXIETY: ICD-10-CM

## 2023-04-04 DIAGNOSIS — L30.4 INTERTRIGO: ICD-10-CM

## 2023-04-04 DIAGNOSIS — E03.9 ACQUIRED HYPOTHYROIDISM: ICD-10-CM

## 2023-04-04 DIAGNOSIS — F31.81 BIPOLAR 2 DISORDER (HCC): ICD-10-CM

## 2023-04-04 DIAGNOSIS — E11.69 DIABETES MELLITUS TYPE 2 IN OBESE (HCC): Primary | ICD-10-CM

## 2023-04-04 DIAGNOSIS — E66.9 DIABETES MELLITUS TYPE 2 IN OBESE (HCC): Primary | ICD-10-CM

## 2023-04-04 LAB
ALBUMIN SERPL-MCNC: 4 G/DL (ref 3.4–5)
ALBUMIN/GLOB SERPL: 1.4 {RATIO} (ref 1.1–2.2)
ALP SERPL-CCNC: 101 U/L (ref 40–129)
ALT SERPL-CCNC: 28 U/L (ref 10–40)
ANION GAP SERPL CALCULATED.3IONS-SCNC: 12 MMOL/L (ref 3–16)
AST SERPL-CCNC: 28 U/L (ref 15–37)
BILIRUB SERPL-MCNC: <0.2 MG/DL (ref 0–1)
BUN SERPL-MCNC: 18 MG/DL (ref 7–20)
CALCIUM SERPL-MCNC: 9 MG/DL (ref 8.3–10.6)
CHLORIDE SERPL-SCNC: 106 MMOL/L (ref 99–110)
CHOLEST SERPL-MCNC: 173 MG/DL (ref 0–199)
CO2 SERPL-SCNC: 23 MMOL/L (ref 21–32)
CREAT SERPL-MCNC: 0.9 MG/DL (ref 0.6–1.1)
CREAT UR-MCNC: 285.6 MG/DL (ref 28–259)
EST. AVERAGE GLUCOSE BLD GHB EST-MCNC: 93.9 MG/DL
GFR SERPLBLD CREATININE-BSD FMLA CKD-EPI: >60 ML/MIN/{1.73_M2}
GLUCOSE SERPL-MCNC: 92 MG/DL (ref 70–99)
HBA1C MFR BLD: 4.9 %
HDLC SERPL-MCNC: 44 MG/DL (ref 40–60)
LDLC SERPL CALC-MCNC: 102 MG/DL
MICROALBUMIN UR DL<=1MG/L-MCNC: 1.5 MG/DL
MICROALBUMIN/CREAT UR: 5.3 MG/G (ref 0–30)
POTASSIUM SERPL-SCNC: 5 MMOL/L (ref 3.5–5.1)
PROT SERPL-MCNC: 6.8 G/DL (ref 6.4–8.2)
SODIUM SERPL-SCNC: 141 MMOL/L (ref 136–145)
TRIGL SERPL-MCNC: 134 MG/DL (ref 0–150)
TSH SERPL DL<=0.005 MIU/L-ACNC: 1.02 UIU/ML (ref 0.27–4.2)
VLDLC SERPL CALC-MCNC: 27 MG/DL

## 2023-04-04 PROCEDURE — 3044F HG A1C LEVEL LT 7.0%: CPT | Performed by: FAMILY MEDICINE

## 2023-04-04 PROCEDURE — 99214 OFFICE O/P EST MOD 30 MIN: CPT | Performed by: FAMILY MEDICINE

## 2023-04-04 RX ORDER — FLUCONAZOLE 100 MG/1
100 TABLET ORAL DAILY
Qty: 7 TABLET | Refills: 0 | Status: SHIPPED | OUTPATIENT
Start: 2023-04-04 | End: 2023-04-11

## 2023-04-04 SDOH — ECONOMIC STABILITY: INCOME INSECURITY: HOW HARD IS IT FOR YOU TO PAY FOR THE VERY BASICS LIKE FOOD, HOUSING, MEDICAL CARE, AND HEATING?: NOT HARD AT ALL

## 2023-04-04 SDOH — ECONOMIC STABILITY: FOOD INSECURITY: WITHIN THE PAST 12 MONTHS, YOU WORRIED THAT YOUR FOOD WOULD RUN OUT BEFORE YOU GOT MONEY TO BUY MORE.: NEVER TRUE

## 2023-04-04 SDOH — ECONOMIC STABILITY: FOOD INSECURITY: WITHIN THE PAST 12 MONTHS, THE FOOD YOU BOUGHT JUST DIDN'T LAST AND YOU DIDN'T HAVE MONEY TO GET MORE.: NEVER TRUE

## 2023-04-04 SDOH — ECONOMIC STABILITY: HOUSING INSECURITY
IN THE LAST 12 MONTHS, WAS THERE A TIME WHEN YOU DID NOT HAVE A STEADY PLACE TO SLEEP OR SLEPT IN A SHELTER (INCLUDING NOW)?: NO

## 2023-04-04 ASSESSMENT — PATIENT HEALTH QUESTIONNAIRE - PHQ9
10. IF YOU CHECKED OFF ANY PROBLEMS, HOW DIFFICULT HAVE THESE PROBLEMS MADE IT FOR YOU TO DO YOUR WORK, TAKE CARE OF THINGS AT HOME, OR GET ALONG WITH OTHER PEOPLE: 3
2. FEELING DOWN, DEPRESSED OR HOPELESS: 3
3. TROUBLE FALLING OR STAYING ASLEEP: 0
7. TROUBLE CONCENTRATING ON THINGS, SUCH AS READING THE NEWSPAPER OR WATCHING TELEVISION: 3
5. POOR APPETITE OR OVEREATING: 3
9. THOUGHTS THAT YOU WOULD BE BETTER OFF DEAD, OR OF HURTING YOURSELF: 0
SUM OF ALL RESPONSES TO PHQ QUESTIONS 1-9: 18
SUM OF ALL RESPONSES TO PHQ9 QUESTIONS 1 & 2: 6
4. FEELING TIRED OR HAVING LITTLE ENERGY: 3
6. FEELING BAD ABOUT YOURSELF - OR THAT YOU ARE A FAILURE OR HAVE LET YOURSELF OR YOUR FAMILY DOWN: 3
1. LITTLE INTEREST OR PLEASURE IN DOING THINGS: 3
8. MOVING OR SPEAKING SO SLOWLY THAT OTHER PEOPLE COULD HAVE NOTICED. OR THE OPPOSITE, BEING SO FIGETY OR RESTLESS THAT YOU HAVE BEEN MOVING AROUND A LOT MORE THAN USUAL: 0
SUM OF ALL RESPONSES TO PHQ QUESTIONS 1-9: 18

## 2023-04-04 NOTE — Clinical Note
Contact dr pizarro for eye report. [facility] for [facility]. Thanks.

## 2023-04-04 NOTE — PROGRESS NOTES
a month in the abdomen, thigh, or upper arm Yes Historical Provider, MD   progesterone (PROMETRIUM) 200 MG CAPS capsule take 1 capsule by mouth once daily in the evening Yes Historical Provider, MD Ganpatide [facility]) 2.5 MG/0.5ML SOPN SC injection Inject 0.5 mLs into the skin once a week Yes Elizabeth Machado MD   atorvastatin (LIPITOR) 40 MG tablet TAKE 1 TABLET BY MOUTH EVERY DAY Yes Elizabeth Machado MD   Insulin Pen Needle 32G X 4 MM MISC 1 each by Does not apply route daily Yes Elizabeth Machado MD   budesonide-formoterol (SYMBICORT) 160-4.5 MCG/ACT AERO INHALE 2 PUFFS BY MOUTH TWO TIMES A DAY Yes Elizabeth Machado MD   topiramate (TOPAMAX) 50 MG tablet TAKE 1 TABLET BY MOUTH AT BEDTIME FOR 1 WEEK AND THEN TWO TIMES PER DAY Yes Historical Provider, MD   celecoxib (CELEBREX) 100 MG capsule Take 1 capsule by mouth daily as needed for Pain Yes Historical Provider, MD   CANNABIDIOL PO Take by mouth Yes Historical Provider, MD   onabotulinumtoxin A (BOTOX) 100 units injection Inject 100 Units into the muscle once Once every 3 months Yes Historical Provider, MD   diclofenac sodium (VOLTAREN) 1 % GEL Apply 4 g topically 4 times daily Yes Elizabeth Machado MD   [facility] SYMTOUCH 3 MG/0.5ML SOAJ INJECT 1 AUTOINJECTOR (0.5 ML) SUBCUTANEOUSLY. MAY REPEAT IN 1 HOUR IF PAIN RETURNS OR INCREASES IN SEVERITY. MAX 2 DOSES PER 24 HRS. Yes Historical Provider, MD   Calcium Citrate-Vitamin D (CITRACAL PETITES/VITAMIN D PO) Take 2 tablets by mouth daily Yes Historical Provider, MD   Multiple Vitamins-Minerals (CENTRUM WOMEN PO) Take 2 tablets by mouth daily Yes Historical Provider, MD   Cetirizine HCl 10 MG CAPS Take 1 tablet by mouth. Yes Elizabeth Machado MD   Prenatal Multivit-Min-Fe-FA (P D  VITAMINS/FOLIC ACID) TABS Take 1 tablet by mouth daily.   Elizabeth Machado MD        Social History     Tobacco Use    Smoking status: Never    Smokeless tobacco: Never    Tobacco comments:

## 2023-04-08 ENCOUNTER — TELEPHONE (OUTPATIENT)
Dept: FAMILY MEDICINE CLINIC | Age: 43
End: 2023-04-08

## 2023-04-08 RX ORDER — AZITHROMYCIN 250 MG/1
250 TABLET, FILM COATED ORAL SEE ADMIN INSTRUCTIONS
Qty: 6 TABLET | Refills: 0 | Status: SHIPPED | OUTPATIENT
Start: 2023-04-08 | End: 2023-04-13

## 2023-04-08 RX ORDER — PREDNISONE 20 MG/1
20 TABLET ORAL 2 TIMES DAILY
Qty: 10 TABLET | Refills: 0 | Status: SHIPPED | OUTPATIENT
Start: 2023-04-08 | End: 2023-04-13

## 2023-04-14 ENCOUNTER — PATIENT MESSAGE (OUTPATIENT)
Dept: FAMILY MEDICINE CLINIC | Age: 43
End: 2023-04-14

## 2023-04-14 DIAGNOSIS — F98.8 ATTENTION DEFICIT DISORDER (ADD) WITHOUT HYPERACTIVITY: ICD-10-CM

## 2023-04-19 NOTE — TELEPHONE ENCOUNTER
Weekend page for asthma flare. Sob, wheezes. Sinus sx. COVID test neg. Ordered pred burst and z pack  F/u if worsens.

## 2023-04-20 RX ORDER — METHYLPHENIDATE HYDROCHLORIDE 10 MG/1
TABLET ORAL
Qty: 60 TABLET | Refills: 0 | Status: SHIPPED | OUTPATIENT
Start: 2023-04-20 | End: 2023-05-20

## 2023-04-20 NOTE — TELEPHONE ENCOUNTER
From: Neno Jones  To: Dr. Quinones Baptiste: 4/14/2023 11:23 AM EDT  Subject: Rx refills     I need my meds set up for 3 months at a time, please. Im slowly starting to run into refills now and want them  All.  in 3 month increments for insurance     Thanks!!

## 2023-05-01 RX ORDER — DESVENLAFAXINE 100 MG/1
200 TABLET, EXTENDED RELEASE ORAL DAILY
Qty: 180 TABLET | Refills: 1 | Status: SHIPPED | OUTPATIENT
Start: 2023-05-01

## 2023-05-02 ENCOUNTER — PATIENT MESSAGE (OUTPATIENT)
Dept: FAMILY MEDICINE CLINIC | Age: 43
End: 2023-05-02

## 2023-05-02 DIAGNOSIS — F31.81 BIPOLAR 2 DISORDER (HCC): ICD-10-CM

## 2023-05-02 DIAGNOSIS — F98.8 ATTENTION DEFICIT DISORDER (ADD) WITHOUT HYPERACTIVITY: ICD-10-CM

## 2023-05-04 ENCOUNTER — TELEPHONE (OUTPATIENT)
Dept: FAMILY MEDICINE CLINIC | Age: 43
End: 2023-05-04

## 2023-05-04 DIAGNOSIS — F41.9 ANXIETY: ICD-10-CM

## 2023-05-04 RX ORDER — ALPRAZOLAM 2 MG/1
TABLET ORAL
Qty: 90 TABLET | Refills: 2 | Status: SHIPPED | OUTPATIENT
Start: 2023-05-04 | End: 2023-05-31

## 2023-05-04 NOTE — TELEPHONE ENCOUNTER
Pt needs Xanax sent to [facility]  Ins has changed so new pharm updated

## 2023-05-05 ENCOUNTER — HOSPITAL ENCOUNTER (OUTPATIENT)
Dept: WOMENS IMAGING | Age: 43
Discharge: HOME | End: 2023-05-05
Payer: COMMERCIAL

## 2023-05-05 DIAGNOSIS — Z12.31 BREAST CANCER SCREENING BY MAMMOGRAM: ICD-10-CM

## 2023-05-05 PROCEDURE — 77063 BREAST TOMOSYNTHESIS BI: CPT

## 2023-05-15 RX ORDER — SEMAGLUTIDE 2.4 MG/.75ML
2.4 INJECTION, SOLUTION SUBCUTANEOUS
Qty: 3 ML | Refills: 5 | Status: SHIPPED | OUTPATIENT
Start: 2023-05-15

## 2023-05-22 ENCOUNTER — PATIENT MESSAGE (OUTPATIENT)
Dept: FAMILY MEDICINE CLINIC | Age: 43
End: 2023-05-22

## 2023-05-22 DIAGNOSIS — F98.8 ATTENTION DEFICIT DISORDER (ADD) WITHOUT HYPERACTIVITY: ICD-10-CM

## 2023-05-22 RX ORDER — METHYLPHENIDATE HYDROCHLORIDE 10 MG/1
TABLET ORAL
Qty: 60 TABLET | Refills: 0 | Status: SHIPPED | OUTPATIENT
Start: 2023-05-22 | End: 2023-06-21

## 2023-05-22 NOTE — TELEPHONE ENCOUNTER
Regarding: Ritalin  Contact: [phone]  ----- Message from Nicol Giron sent at 5/22/2023  1:31 PM EDT -----  OK for the med?     ----- Message from Marlyn Kent to Osiel Briseno MD sent at 5/22/2023 12:03 PM -----   I need an Rx of Ritalin please.

## 2023-05-22 NOTE — TELEPHONE ENCOUNTER
From: Camille Astudillo  To: Dr. Patel Young: 5/22/2023 12:03 PM EDT  Subject: Ritalin    I need an Rx of Ritalin please.

## 2023-05-30 RX ORDER — SEMAGLUTIDE 2.4 MG/.75ML
2.4 INJECTION, SOLUTION SUBCUTANEOUS
Qty: 3 ML | Refills: 5 | Status: SHIPPED | OUTPATIENT
Start: 2023-05-30

## 2023-06-27 DIAGNOSIS — F98.8 ATTENTION DEFICIT DISORDER (ADD) WITHOUT HYPERACTIVITY: ICD-10-CM

## 2023-06-27 RX ORDER — METHYLPHENIDATE HYDROCHLORIDE 10 MG/1
TABLET ORAL
Qty: 60 TABLET | Refills: 0 | Status: SHIPPED | OUTPATIENT
Start: 2023-06-27 | End: 2023-07-27

## 2023-08-11 ENCOUNTER — HOSPITAL ENCOUNTER (OUTPATIENT)
Dept: GENERAL RADIOLOGY | Age: 43
Discharge: HOME | End: 2023-08-11
Attending: FAMILY MEDICINE
Payer: COMMERCIAL

## 2023-08-11 ENCOUNTER — OFFICE VISIT (OUTPATIENT)
Dept: FAMILY MEDICINE CLINIC | Age: 43
End: 2023-08-11
Payer: COMMERCIAL

## 2023-08-11 ENCOUNTER — HOSPITAL ENCOUNTER (OUTPATIENT)
Age: 43
Discharge: HOME | End: 2023-08-11
Payer: COMMERCIAL

## 2023-08-11 VITALS
HEART RATE: 84 BPM | SYSTOLIC BLOOD PRESSURE: 126 MMHG | DIASTOLIC BLOOD PRESSURE: 86 MMHG | HEIGHT: 65 IN | RESPIRATION RATE: 14 BRPM | WEIGHT: 283 LBS | BODY MASS INDEX: 47.15 KG/M2

## 2023-08-11 DIAGNOSIS — M25.532 LEFT WRIST PAIN: ICD-10-CM

## 2023-08-11 DIAGNOSIS — I88.9 LYMPHADENITIS: ICD-10-CM

## 2023-08-11 DIAGNOSIS — R29.6 RECURRENT FALLS WHILE WALKING: Primary | ICD-10-CM

## 2023-08-11 DIAGNOSIS — E11.69 DIABETES MELLITUS TYPE 2 IN OBESE (HCC): ICD-10-CM

## 2023-08-11 DIAGNOSIS — E66.9 DIABETES MELLITUS TYPE 2 IN OBESE (HCC): ICD-10-CM

## 2023-08-11 LAB — HBA1C MFR BLD: 5.9 %

## 2023-08-11 PROCEDURE — G8417 CALC BMI ABV UP PARAM F/U: HCPCS | Performed by: FAMILY MEDICINE

## 2023-08-11 PROCEDURE — 99214 OFFICE O/P EST MOD 30 MIN: CPT | Performed by: FAMILY MEDICINE

## 2023-08-11 PROCEDURE — G8428 CUR MEDS NOT DOCUMENT: HCPCS | Performed by: FAMILY MEDICINE

## 2023-08-11 PROCEDURE — 2022F DILAT RTA XM EVC RTNOPTHY: CPT | Performed by: FAMILY MEDICINE

## 2023-08-11 PROCEDURE — 73110 X-RAY EXAM OF WRIST: CPT

## 2023-08-11 PROCEDURE — 3044F HG A1C LEVEL LT 7.0%: CPT | Performed by: FAMILY MEDICINE

## 2023-08-11 PROCEDURE — 1036F TOBACCO NON-USER: CPT | Performed by: FAMILY MEDICINE

## 2023-08-11 PROCEDURE — 83037 HB GLYCOSYLATED A1C HOME DEV: CPT | Performed by: FAMILY MEDICINE

## 2023-08-11 RX ORDER — AMOXICILLIN AND CLAVULANATE POTASSIUM 875; 125 MG/1; MG/1
1 TABLET, FILM COATED ORAL 2 TIMES DAILY
Qty: 14 TABLET | Refills: 0 | Status: SHIPPED | OUTPATIENT
Start: 2023-08-11 | End: 2023-08-18

## 2023-08-14 ENCOUNTER — PATIENT MESSAGE (OUTPATIENT)
Dept: FAMILY MEDICINE CLINIC | Age: 43
End: 2023-08-14

## 2023-08-17 RX ORDER — LEVOTHYROXINE SODIUM 0.05 MG/1
TABLET ORAL
Qty: 62 TABLET | Refills: 5 | Status: SHIPPED | OUTPATIENT
Start: 2023-08-17

## 2023-08-20 ENCOUNTER — TELEPHONE (OUTPATIENT)
Dept: FAMILY MEDICINE CLINIC | Age: 43
End: 2023-08-20

## 2023-08-20 RX ORDER — FLUCONAZOLE 150 MG/1
TABLET ORAL
Qty: 2 TABLET | Refills: 0 | Status: SHIPPED | OUTPATIENT
Start: 2023-08-20

## 2023-08-21 DIAGNOSIS — F51.04 PSYCHOPHYSIOLOGICAL INSOMNIA: ICD-10-CM

## 2023-08-21 RX ORDER — METHYLPHENIDATE HYDROCHLORIDE 10 MG/1
TABLET ORAL
Qty: 60 TABLET | Refills: 0 | Status: SHIPPED | OUTPATIENT
Start: 2023-08-21 | End: 2023-09-20

## 2023-08-21 NOTE — TELEPHONE ENCOUNTER
Weekend page for yeast vaginitis due to Augmentin. Rx for diflucan 150 ordered to her [address] [place] [address].

## 2023-08-22 RX ORDER — QUETIAPINE 200 MG/1
TABLET, FILM COATED, EXTENDED RELEASE ORAL
Qty: 180 TABLET | Refills: 1 | Status: SHIPPED | OUTPATIENT
Start: 2023-08-22

## 2023-08-27 SDOH — HEALTH STABILITY: PHYSICAL HEALTH: ON AVERAGE, HOW MANY DAYS PER WEEK DO YOU ENGAGE IN MODERATE TO STRENUOUS EXERCISE (LIKE A BRISK WALK)?: 0 DAYS

## 2023-08-27 SDOH — HEALTH STABILITY: PHYSICAL HEALTH: ON AVERAGE, HOW MANY MINUTES DO YOU ENGAGE IN EXERCISE AT THIS LEVEL?: 0 MIN

## 2023-08-27 ASSESSMENT — SOCIAL DETERMINANTS OF HEALTH (SDOH)

## 2023-08-28 ENCOUNTER — OFFICE VISIT (OUTPATIENT)
Dept: ORTHOPEDIC SURGERY | Age: 43
End: 2023-08-28
Payer: COMMERCIAL

## 2023-08-28 ENCOUNTER — OFFICE VISIT (OUTPATIENT)
Dept: FAMILY MEDICINE CLINIC | Age: 43
End: 2023-08-28
Payer: COMMERCIAL

## 2023-08-28 VITALS — WEIGHT: 282 LBS | BODY MASS INDEX: 46.98 KG/M2 | RESPIRATION RATE: 16 BRPM | HEIGHT: 65 IN

## 2023-08-28 VITALS
SYSTOLIC BLOOD PRESSURE: 122 MMHG | TEMPERATURE: 98 F | DIASTOLIC BLOOD PRESSURE: 84 MMHG | WEIGHT: 282 LBS | HEIGHT: 65 IN | RESPIRATION RATE: 18 BRPM | HEART RATE: 84 BPM | OXYGEN SATURATION: 97 % | BODY MASS INDEX: 46.98 KG/M2

## 2023-08-28 DIAGNOSIS — M54.2 CERVICAL PAIN: Primary | ICD-10-CM

## 2023-08-28 DIAGNOSIS — M25.532 LEFT WRIST PAIN: Primary | ICD-10-CM

## 2023-08-28 PROCEDURE — G8427 DOCREV CUR MEDS BY ELIG CLIN: HCPCS | Performed by: FAMILY MEDICINE

## 2023-08-28 PROCEDURE — G8417 CALC BMI ABV UP PARAM F/U: HCPCS | Performed by: PHYSICIAN ASSISTANT

## 2023-08-28 PROCEDURE — 29075 APPL CST ELBW FNGR SHORT ARM: CPT | Performed by: PHYSICIAN ASSISTANT

## 2023-08-28 PROCEDURE — 99203 OFFICE O/P NEW LOW 30 MIN: CPT | Performed by: PHYSICIAN ASSISTANT

## 2023-08-28 PROCEDURE — 1036F TOBACCO NON-USER: CPT | Performed by: PHYSICIAN ASSISTANT

## 2023-08-28 PROCEDURE — 1036F TOBACCO NON-USER: CPT | Performed by: FAMILY MEDICINE

## 2023-08-28 PROCEDURE — 99213 OFFICE O/P EST LOW 20 MIN: CPT | Performed by: FAMILY MEDICINE

## 2023-08-28 PROCEDURE — G8417 CALC BMI ABV UP PARAM F/U: HCPCS | Performed by: FAMILY MEDICINE

## 2023-08-28 PROCEDURE — G8427 DOCREV CUR MEDS BY ELIG CLIN: HCPCS | Performed by: PHYSICIAN ASSISTANT

## 2023-08-28 NOTE — PROGRESS NOTES
Hazel Mandel PA-C. ordered a thumb spica cast  to be applied to Evangelist Astudillo's left upper Hand . I applied a thumb spica cast cast to Evangelist Astudillo's left upper Hand. I applied 1 rolls of under padding in a 50/50 fashion. The Khoa Borer requested black color fiberglass. I rolled 2 rolls of fiberglass in a 50/50 fashion. Her left upper Hand is in a neutral degree position Khloe Gonsales PA-C . Myla Astudillo's nail beds are pink in color, the extremity is pink to the touch. Capillary refill is less than 2 seconds. Khoa Patel instructed on proper care of cast.  Do not get wet, keep all items out of cast.  If cast is painful please make appointment to get checked. Patient also briefed on circulation compromise. If digits are cold, blue, and tingling patient must must seek care. If after hours patient is to go to Emergency Room. During office hours patient must come in to office.
her.  I have explained the complications, limitations, expectations, alternatives, & risks of her chosen treatment. We discussed the possibility of residual symptoms as may be related to conservative treatment of fractures including the possibilities of: mal-union, non-union, delayed union, persistent deformity, persistent pain, limitation of motion, future arthritic symptoms & the possible need for further treatment. She understood our discussion and was comfortable with her decision; she was provided with appropriate expectations. She is today fitted with a carefully applied, well padded & appropriately molded Short Arm Thumb-Spica Fiberglass Cast.  She was given a Patient Instruction Sheet related to cast care. I have asked her to schedule a follow-up appointment for 4 weeks from now at which time we will obtain repeat radiographs of the Wrist & Scaphoid out of splint/cast.    She is specifically instructed to contact the office between now & her scheduled appointment if she has concerns related to the cast or the underlying injury. She is welcome to call for an appointment sooner if she has any additional concerns or questions. I have also discussed with Ms. Sharona Calderon  the other treatment options available to her  for this condition. We have today selected to proceed with conservative management. She and I have agreed that if our current course of conservative treatment does not prove to be effective over the short term future, that she will schedule a follow-up appointment to discuss and select an alternate course of therapy including possibly injection or surgical treatment. Ms. Sharona Calderon has been given a full verbal list of instructions and precautions related to her present condition. I have asked her to followup with me in the office at the prescribed time.  She is also specifically requested to call or return to the office sooner if her symptoms change or worsen

## 2023-08-29 DIAGNOSIS — F31.81 BIPOLAR 2 DISORDER (HCC): ICD-10-CM

## 2023-08-29 DIAGNOSIS — E78.2 MIXED HYPERLIPIDEMIA: ICD-10-CM

## 2023-08-29 RX ORDER — ATORVASTATIN CALCIUM 40 MG/1
40 TABLET, FILM COATED ORAL DAILY
Qty: 31 TABLET | Refills: 5 | Status: SHIPPED | OUTPATIENT
Start: 2023-08-29

## 2023-08-29 RX ORDER — ALPRAZOLAM 2 MG/1
TABLET ORAL
Qty: 93 TABLET | Refills: 2 | Status: SHIPPED | OUTPATIENT
Start: 2023-08-29 | End: 2023-11-27

## 2023-08-29 RX ORDER — LAMOTRIGINE 200 MG/1
TABLET ORAL
Qty: 62 TABLET | Refills: 5 | Status: SHIPPED | OUTPATIENT
Start: 2023-08-29 | End: 2023-10-02 | Stop reason: SDUPTHER

## 2023-09-11 ENCOUNTER — PATIENT MESSAGE (OUTPATIENT)
Dept: FAMILY MEDICINE CLINIC | Age: 43
End: 2023-09-11

## 2023-09-11 DIAGNOSIS — J31.2 PHARYNGITIS, CHRONIC: Primary | ICD-10-CM

## 2023-09-22 ENCOUNTER — PATIENT MESSAGE (OUTPATIENT)
Dept: FAMILY MEDICINE CLINIC | Age: 43
End: 2023-09-22

## 2023-09-22 ENCOUNTER — OFFICE VISIT (OUTPATIENT)
Dept: ORTHOPEDIC SURGERY | Age: 43
End: 2023-09-22

## 2023-09-22 VITALS — BODY MASS INDEX: 46.98 KG/M2 | WEIGHT: 282 LBS | HEIGHT: 65 IN

## 2023-09-22 DIAGNOSIS — M25.532 LEFT WRIST PAIN: Primary | ICD-10-CM

## 2023-09-22 RX ORDER — PREDNISONE 20 MG/1
20 TABLET ORAL 2 TIMES DAILY
Qty: 10 TABLET | Refills: 0 | Status: SHIPPED | OUTPATIENT
Start: 2023-09-22 | End: 2023-09-27

## 2023-09-22 NOTE — PATIENT INSTRUCTIONS
Hand Range of Motion Instructions      Dr. Brody Hart    Be cautions in resuming fulll activities and use of the hand for next 2 - 4 weeks. Perform the following exercises VIGOROUSLY at least four times a day. Exercises should be performed in the seated position with elbow on tabletop or other firm surface. If you cannot make these motions on your own, you may use other hand to assist in making these motions. Fully straighten fingers until hand is flat. Fully bend fingers until hand is in a full fist.   Bend wrist forward and backward (grasp hand around knuckles with other hand to do so). Rotate forearm so that your palm faces towards your face. Rotate forearm so that your palm faces away from your face (grasp hand around wrist with other hand to do so). Fully straighten elbow. Fully bend elbow. Continue light use of the hand progressing to more normal us as it feels comfortable to do so. In 2 - 4 weeks you may discontinue using the brace (if you were using one) and resume normal use of the hand and wrist if you have regained full and painless motion and function. If you are unable to achieve  full and painless motion and function over 4 weeks, please call the office at [phone] to schedule a follow-up appointment with Dr. Azeem Lay. Thank you for choosing [facility]) Physicians for your Hand and Upper Extremity needs. If we can be of any further assistance to you, please do not hesitate to contact us.     Office Phone Number:  [phone]  or  [phone]

## 2023-09-22 NOTE — PROGRESS NOTES
Ms. Verena Sky returns today in follow-up of her recent left wrist sprain injury which occurred approximately 7 weeks ago. She has been treated with conservative measures, avoidance of bothersome tasks, and cast immobilization . She has noted unchanged discomfort and decreased swelling. She notes diffuse symptoms of pain throughout the hand with diffuse numbness, tingling, no symptoms related to perfusion. I have today reviewed with Verena Sky the clinically relevant, past medical history, medications, allergies,  family history, social history, and Review Of Systems & I have documented any details relevant to today's presenting complaints in my history above. Ms. Debi Astudillo's self-reported past medical history, medications, allergies,  family history, social history, and Review Of Systems have been scanned into the chart under the \"Media\" tab. Physical Exam:  Vitals  Height: 5' 5\" (165.1 cm)  Weight - Scale: 282 lb (127.9 kg)  Ms. Verena Sky appears well, she is in no apparent distress, she demonstrates appropriate mood & affect. Skin: Normal in appearance, Normal Color, and Free of Lesions Bilaterally   Digits: range of motion is without significant limitation bilaterally. Thumb shows range of motion to be without significant limitation bilaterally  Wrist range of motion is limited by effort on the Left, normal on the Right  Sensation is present bilaterally  Vascular examination reveals normal and good capillary refill bilaterally  Swelling is absent Radial, Ulnar, Dorsal, and Volar wrist on the Left, normal on the Right  There is nonanatomically localizing tenderness to palpation of the left wrist, equally and without focal prominence at the Radio-carpal Joint, Ulno-carpal Joint, Distal Radio-Ulnar Joint, and through out the entire hand   Muscular strength is clinically appropriate bilaterally.   Cervical Spine: Active Range of Motion  is Decreased with pain at

## 2023-09-26 NOTE — TELEPHONE ENCOUNTER
OK to schedule her on a Saturday for this. Routine follow up for anxiety/depression to review genesight report and determine treatment plan. 102

## 2023-09-27 ENCOUNTER — OFFICE VISIT (OUTPATIENT)
Dept: ENT CLINIC | Age: 43
End: 2023-09-27
Payer: COMMERCIAL

## 2023-09-27 VITALS
BODY MASS INDEX: 47.15 KG/M2 | OXYGEN SATURATION: 97 % | RESPIRATION RATE: 16 BRPM | HEIGHT: 65 IN | WEIGHT: 283 LBS | SYSTOLIC BLOOD PRESSURE: 138 MMHG | DIASTOLIC BLOOD PRESSURE: 80 MMHG | HEART RATE: 89 BPM

## 2023-09-27 DIAGNOSIS — M26.622 ARTHRALGIA OF LEFT TEMPOROMANDIBULAR JOINT: Primary | ICD-10-CM

## 2023-09-27 DIAGNOSIS — K11.20 SALIVARY GLAND INFLAMMATION: ICD-10-CM

## 2023-09-27 DIAGNOSIS — H69.92 DYSFUNCTION OF LEFT EUSTACHIAN TUBE: ICD-10-CM

## 2023-09-27 PROCEDURE — 99203 OFFICE O/P NEW LOW 30 MIN: CPT | Performed by: STUDENT IN AN ORGANIZED HEALTH CARE EDUCATION/TRAINING PROGRAM

## 2023-09-27 PROCEDURE — 1036F TOBACCO NON-USER: CPT | Performed by: STUDENT IN AN ORGANIZED HEALTH CARE EDUCATION/TRAINING PROGRAM

## 2023-09-27 PROCEDURE — G8417 CALC BMI ABV UP PARAM F/U: HCPCS | Performed by: STUDENT IN AN ORGANIZED HEALTH CARE EDUCATION/TRAINING PROGRAM

## 2023-09-27 PROCEDURE — G8427 DOCREV CUR MEDS BY ELIG CLIN: HCPCS | Performed by: STUDENT IN AN ORGANIZED HEALTH CARE EDUCATION/TRAINING PROGRAM

## 2023-09-27 NOTE — PROGRESS NOTES
[address] [facility] (:  1980) is a 37 y.o. female, here for evaluation of the following chief complaint(s):  Otalgia (Left ) and Pharyngitis (Left  side )      ASSESSMENT/PLAN:  1. Arthralgia of left temporomandibular joint  2. Dysfunction of left eustachian tube  3. Salivary gland inflammation      This is a very pleasant 37 y.o. female here today for evaluation of the the above-noted complaints.      -Fluticasone 1 spray twice daily for suspected eustachian tube dysfunction  -Discussed that some of her severe otalgia is referred pain from her left temporomandibular joint. Discussed treatment options including soft diet, NSAIDs, referral to physical therapy, bite block.  -Suspect she is suffering from occasional inflammation of her left submandibular gland. As she is not getting recurrent infections requiring multiple rounds of antibiotics, we discussed waiting to see if this becomes more frequent. If it did, we discussed that we could remove her submandibular gland. Medical Decision Making: The following items were considered in medical decision making:  Independent review of images  Review / order clinical lab tests  Review / order radiology tests  Decision to obtain old records  Review and summation of old records as accessed through [facility] if applicable    SUBJECTIVE/OBJECTIVE:  RAYNA Hughes is here today for evaluation of possible complaints. The patient states 1 month ago she developed inflammation of her throat and her left saliva gland. She got her on antibiotics and started to feel better. She then started to get some pain associated with left ear and with chewing. It hurt significantly. Describes as a 10 out of 10. It left her immobilized for 3 days. She got a round of steroids and this improved her symptoms significantly. She does admit to clenching. Has never been diagnosed with TMJ.

## 2023-10-02 DIAGNOSIS — F31.81 BIPOLAR 2 DISORDER (HCC): ICD-10-CM

## 2023-10-02 RX ORDER — LAMOTRIGINE 200 MG/1
200 TABLET ORAL 2 TIMES DAILY
Qty: 62 TABLET | Refills: 5 | Status: SHIPPED | OUTPATIENT
Start: 2023-10-02

## 2023-10-02 RX ORDER — PROGESTERONE 200 MG/1
CAPSULE ORAL
Qty: 20 CAPSULE | Refills: 1 | Status: SHIPPED | OUTPATIENT
Start: 2023-10-02

## 2023-10-04 ENCOUNTER — PATIENT MESSAGE (OUTPATIENT)
Dept: FAMILY MEDICINE CLINIC | Age: 43
End: 2023-10-04

## 2023-10-04 DIAGNOSIS — F98.8 ATTENTION DEFICIT DISORDER (ADD) WITHOUT HYPERACTIVITY: ICD-10-CM

## 2023-10-04 RX ORDER — METHYLPHENIDATE HYDROCHLORIDE 10 MG/1
TABLET ORAL
Qty: 60 TABLET | Refills: 0 | Status: SHIPPED | OUTPATIENT
Start: 2023-10-04 | End: 2023-11-03

## 2023-10-04 NOTE — TELEPHONE ENCOUNTER
----- Message from [address] [place] [address]. Wilda sent at 10/4/2023 12:43 PM EDT -----  Regarding: Ritalin  Contact: [phone]  Hello! I need a refill of Ritalin sent to [facility] please.

## 2023-10-06 RX ORDER — DICYCLOMINE HCL 20 MG
TABLET ORAL
Qty: 120 TABLET | Refills: 5 | Status: SHIPPED | OUTPATIENT
Start: 2023-10-06

## 2023-10-06 RX ORDER — CLONIDINE HYDROCHLORIDE 0.1 MG/1
0.1 TABLET ORAL 2 TIMES DAILY
Qty: 60 TABLET | Refills: 5 | Status: SHIPPED | OUTPATIENT
Start: 2023-10-06

## 2023-10-25 RX ORDER — METHYLPHENIDATE HYDROCHLORIDE 10 MG/1
TABLET ORAL
Qty: 60 TABLET | Refills: 0 | Status: SHIPPED | OUTPATIENT
Start: 2023-10-25 | End: 2023-11-24

## 2023-10-27 DIAGNOSIS — F31.81 BIPOLAR 2 DISORDER (HCC): ICD-10-CM

## 2023-10-27 RX ORDER — ALPRAZOLAM 2 MG/1
TABLET ORAL
Qty: 84 TABLET | Refills: 0 | OUTPATIENT
Start: 2023-10-27 | End: 2024-01-25

## 2023-10-27 RX ORDER — PROGESTERONE 200 MG/1
CAPSULE ORAL
Qty: 31 CAPSULE | Refills: 2 | Status: SHIPPED | OUTPATIENT
Start: 2023-10-27

## 2023-10-27 RX ORDER — BUSPIRONE HYDROCHLORIDE 30 MG/1
TABLET ORAL
Qty: 93 TABLET | Refills: 2 | Status: SHIPPED | OUTPATIENT
Start: 2023-10-27

## 2023-11-16 DIAGNOSIS — F31.81 BIPOLAR 2 DISORDER (HCC): ICD-10-CM

## 2023-11-17 RX ORDER — ALPRAZOLAM 2 MG/1
2 TABLET ORAL 3 TIMES DAILY PRN
Qty: 90 TABLET | Refills: 2 | Status: SHIPPED | OUTPATIENT
Start: 2023-11-17 | End: 2024-02-15

## 2023-11-27 DIAGNOSIS — F51.04 PSYCHOPHYSIOLOGICAL INSOMNIA: ICD-10-CM

## 2023-11-27 RX ORDER — PROPRANOLOL HCL 60 MG
60 CAPSULE, EXTENDED RELEASE 24HR ORAL DAILY
Qty: 30 CAPSULE | Refills: 3 | Status: SHIPPED | OUTPATIENT
Start: 2023-11-27

## 2023-11-27 RX ORDER — QUETIAPINE 200 MG/1
TABLET, FILM COATED, EXTENDED RELEASE ORAL
Qty: 60 TABLET | Refills: 3 | Status: SHIPPED | OUTPATIENT
Start: 2023-11-27

## 2023-12-13 RX ORDER — LEVOFLOXACIN 500 MG/1
500 TABLET, FILM COATED ORAL DAILY
Qty: 10 TABLET | Refills: 0 | Status: SHIPPED | OUTPATIENT
Start: 2023-12-13 | End: 2023-12-23

## 2023-12-13 RX ORDER — CIPROFLOXACIN AND DEXAMETHASONE 3; 1 MG/ML; MG/ML
4 SUSPENSION/ DROPS AURICULAR (OTIC) 2 TIMES DAILY
Qty: 7.5 ML | Refills: 0 | Status: SHIPPED | OUTPATIENT
Start: 2023-12-13 | End: 2023-12-20

## 2023-12-20 ENCOUNTER — PATIENT MESSAGE (OUTPATIENT)
Dept: FAMILY MEDICINE CLINIC | Age: 43
End: 2023-12-20

## 2023-12-20 DIAGNOSIS — F98.8 ATTENTION DEFICIT DISORDER (ADD) WITHOUT HYPERACTIVITY: ICD-10-CM

## 2024-01-02 RX ORDER — GABAPENTIN 100 MG/1
CAPSULE ORAL
Qty: 90 CAPSULE | Refills: 0 | Status: SHIPPED | OUTPATIENT
Start: 2024-01-02 | End: 2024-01-10

## 2024-01-04 RX ORDER — METHYLPHENIDATE HYDROCHLORIDE 10 MG/1
TABLET ORAL
Qty: 60 TABLET | Refills: 0 | Status: SHIPPED | OUTPATIENT
Start: 2024-01-04 | End: 2024-02-03

## 2024-01-04 RX ORDER — SEMAGLUTIDE 2.4 MG/.75ML
INJECTION, SOLUTION SUBCUTANEOUS
Qty: 3 ML | Refills: 0 | Status: SHIPPED | OUTPATIENT
Start: 2024-01-04

## 2024-01-04 NOTE — TELEPHONE ENCOUNTER
From: Myla Astudillo  Sent: 1/4/2024 2:25 PM EST  To: [facility]  Subject: Ritalin    Albertos still can’t get Ritalin. Can you please call it in to [facility].     Thanks!

## 2024-01-09 ENCOUNTER — TELEPHONE (OUTPATIENT)
Dept: FAMILY MEDICINE CLINIC | Age: 44
End: 2024-01-09

## 2024-01-09 NOTE — TELEPHONE ENCOUNTER
LVM for the patient to call back to see what she needs do have renewed on her prior authorization.  Asked if patient could call or send PASSNFLY message.

## 2024-01-10 ENCOUNTER — TELEPHONE (OUTPATIENT)
Dept: FAMILY MEDICINE CLINIC | Age: 44
End: 2024-01-10

## 2024-01-10 NOTE — TELEPHONE ENCOUNTER
Patient needs a re albin on her prior auth for Ozempic 1mg  DX: Diabetes type 2 in obesity.  Ell.69, E66.9  Thank you!

## 2024-01-11 NOTE — TELEPHONE ENCOUNTER
Submitted PA for Ozempic (1 MG/DOSE) 4MG/3ML pen-injectors  Via CMM (Key: [id number]) STATUS: PENDING.    Follow up done daily; if no decision with in three days we will refax.  If another three days goes by with no decision will call the insurance for status.

## 2024-01-12 ENCOUNTER — PATIENT MESSAGE (OUTPATIENT)
Dept: FAMILY MEDICINE CLINIC | Age: 44
End: 2024-01-12

## 2024-01-12 NOTE — TELEPHONE ENCOUNTER
The medication is APPROVED.    [id number]. OZEMPIC INJ 4MG/3ML is approved through 01/11/2025. Your patient may now fill this prescription and it will be covered.    If this requires a response please respond to the pool ( P MHCX PSC MEDICATION PRE-AUTH).      Thank you please advise patient.

## 2024-01-18 NOTE — TELEPHONE ENCOUNTER
From: NATHAN CANTU  To: Myla Astudillo  Sent: 1/12/2024 9:05 AM EST  Subject: FLORIAN Lanza,    The PA for your Ozempic was approved. You should be all set.    Have a wonderful weekend!

## 2024-01-19 NOTE — TELEPHONE ENCOUNTER
PA for Wegovy was denied  Ozempic might be covered as she is diabetic  What would her dose be? Can try PA before escribing.

## 2024-01-20 DIAGNOSIS — F31.81 BIPOLAR 2 DISORDER (HCC): ICD-10-CM

## 2024-01-22 RX ORDER — LAMOTRIGINE 200 MG/1
TABLET ORAL
Qty: 60 TABLET | Refills: 1 | Status: SHIPPED | OUTPATIENT
Start: 2024-01-22

## 2024-02-14 RX ORDER — SEMAGLUTIDE 1.34 MG/ML
1 INJECTION, SOLUTION SUBCUTANEOUS WEEKLY
Qty: 3 ML | Refills: 0 | OUTPATIENT
Start: 2024-02-14

## 2024-02-15 RX ORDER — SEMAGLUTIDE 1.34 MG/ML
1 INJECTION, SOLUTION SUBCUTANEOUS WEEKLY
Qty: 3 ML | Refills: 0 | OUTPATIENT
Start: 2024-02-15

## 2024-02-16 RX ORDER — SEMAGLUTIDE 1.34 MG/ML
1 INJECTION, SOLUTION SUBCUTANEOUS WEEKLY
Qty: 3 ML | Refills: 0 | OUTPATIENT
Start: 2024-02-16

## 2024-02-17 ENCOUNTER — PATIENT MESSAGE (OUTPATIENT)
Dept: FAMILY MEDICINE CLINIC | Age: 44
End: 2024-02-17

## 2024-02-17 DIAGNOSIS — F98.8 ATTENTION DEFICIT DISORDER (ADD) WITHOUT HYPERACTIVITY: ICD-10-CM

## 2024-02-19 RX ORDER — METHYLPHENIDATE HYDROCHLORIDE 10 MG/1
TABLET ORAL
Qty: 60 TABLET | Refills: 0 | Status: SHIPPED | OUTPATIENT
Start: 2024-02-19 | End: 2024-03-20

## 2024-02-19 NOTE — TELEPHONE ENCOUNTER
From: Myla Astudillo  To: Dr. Martinez Baron  Sent: 2/17/2024 4:11 PM EST  Subject: Ritalin    I need a refill of my Ritalin please!

## 2024-02-20 RX ORDER — PROGESTERONE 200 MG/1
CAPSULE ORAL
Qty: 30 CAPSULE | Refills: 0 | Status: SHIPPED | OUTPATIENT
Start: 2024-02-20

## 2024-02-20 RX ORDER — BUSPIRONE HYDROCHLORIDE 30 MG/1
TABLET ORAL
Qty: 90 TABLET | Refills: 0 | Status: SHIPPED | OUTPATIENT
Start: 2024-02-20

## 2024-02-20 NOTE — TELEPHONE ENCOUNTER
Due for follow up with Dr Baron- please call them to schedule at their earliest convenience. (meds have been refilled this time)

## 2024-02-23 NOTE — TELEPHONE ENCOUNTER
LVM to schedule appointment    Per Dr. Dukes, patient needs to have AED levels drawn and to stay home from work this week. This was shared with the patient. A letter was drafted for Dr. Dukes excusing him from work.

## 2024-03-15 ENCOUNTER — TELEPHONE (OUTPATIENT)
Dept: FAMILY MEDICINE CLINIC | Age: 44
End: 2024-03-15

## 2024-03-15 DIAGNOSIS — J45.20 MILD INTERMITTENT ASTHMA WITHOUT COMPLICATION: ICD-10-CM

## 2024-03-15 RX ORDER — BUDESONIDE AND FORMOTEROL FUMARATE DIHYDRATE 160; 4.5 UG/1; UG/1
AEROSOL RESPIRATORY (INHALATION)
Qty: 10.2 G | Refills: 3 | Status: SHIPPED | OUTPATIENT
Start: 2024-03-15

## 2024-03-15 NOTE — TELEPHONE ENCOUNTER
Patient would like rx for Symbicort sent to [facility] pharmacy to see what the price will be.  Med is pending

## 2024-04-05 DIAGNOSIS — F31.81 BIPOLAR 2 DISORDER (HCC): ICD-10-CM

## 2024-04-05 RX ORDER — BUSPIRONE HYDROCHLORIDE 30 MG/1
TABLET ORAL
Qty: 90 TABLET | Refills: 2 | Status: SHIPPED | OUTPATIENT
Start: 2024-04-05

## 2024-04-05 RX ORDER — OMEPRAZOLE 40 MG/1
40 CAPSULE, DELAYED RELEASE ORAL 2 TIMES DAILY
Qty: 60 CAPSULE | Refills: 2 | Status: SHIPPED | OUTPATIENT
Start: 2024-04-05

## 2024-04-05 RX ORDER — LEVOTHYROXINE SODIUM 0.05 MG/1
TABLET ORAL
Qty: 60 TABLET | Refills: 2 | Status: SHIPPED | OUTPATIENT
Start: 2024-04-05

## 2024-04-05 RX ORDER — LAMOTRIGINE 200 MG/1
TABLET ORAL
Qty: 60 TABLET | Refills: 2 | Status: SHIPPED | OUTPATIENT
Start: 2024-04-05

## 2024-04-05 RX ORDER — PROGESTERONE 200 MG/1
CAPSULE ORAL
Qty: 30 CAPSULE | Refills: 2 | Status: SHIPPED | OUTPATIENT
Start: 2024-04-05

## 2024-04-15 DIAGNOSIS — F41.9 ANXIETY: Primary | ICD-10-CM

## 2024-04-15 RX ORDER — ALPRAZOLAM 2 MG/1
2 TABLET ORAL
COMMUNITY
Start: 2024-03-16 | End: 2024-04-15 | Stop reason: SDUPTHER

## 2024-04-15 RX ORDER — ALPRAZOLAM 2 MG/1
2 TABLET ORAL 3 TIMES DAILY PRN
Qty: 90 TABLET | Refills: 0 | Status: SHIPPED | OUTPATIENT
Start: 2024-04-15 | End: 2024-05-15

## 2024-04-15 RX ORDER — SEMAGLUTIDE 2.68 MG/ML
INJECTION, SOLUTION SUBCUTANEOUS
Qty: 3 ML | Refills: 0 | Status: SHIPPED | OUTPATIENT
Start: 2024-04-15

## 2024-04-15 NOTE — TELEPHONE ENCOUNTER
Patient is over-due for follow up.  Please call them to schedule. Medicines will be refilled this time to allow for making an appointment.

## 2024-04-26 ENCOUNTER — TELEPHONE (OUTPATIENT)
Dept: FAMILY MEDICINE CLINIC | Age: 44
End: 2024-04-26

## 2024-04-26 NOTE — TELEPHONE ENCOUNTER
Would you be ok if the patient does her med check with her pre op at next visit or do you want separate appointment?

## 2024-05-02 ENCOUNTER — OFFICE VISIT (OUTPATIENT)
Dept: FAMILY MEDICINE CLINIC | Age: 44
End: 2024-05-02
Payer: COMMERCIAL

## 2024-05-02 VITALS
WEIGHT: 283 LBS | RESPIRATION RATE: 16 BRPM | HEIGHT: 65 IN | SYSTOLIC BLOOD PRESSURE: 132 MMHG | DIASTOLIC BLOOD PRESSURE: 64 MMHG | OXYGEN SATURATION: 97 % | BODY MASS INDEX: 47.15 KG/M2 | HEART RATE: 79 BPM

## 2024-05-02 DIAGNOSIS — E78.5 HYPERLIPIDEMIA, UNSPECIFIED HYPERLIPIDEMIA TYPE: ICD-10-CM

## 2024-05-02 DIAGNOSIS — E11.69 TYPE 2 DIABETES MELLITUS WITH OBESITY (HCC): ICD-10-CM

## 2024-05-02 DIAGNOSIS — F31.81 BIPOLAR 2 DISORDER (HCC): ICD-10-CM

## 2024-05-02 DIAGNOSIS — E66.9 TYPE 2 DIABETES MELLITUS WITH OBESITY (HCC): ICD-10-CM

## 2024-05-02 DIAGNOSIS — F51.04 PSYCHOPHYSIOLOGICAL INSOMNIA: ICD-10-CM

## 2024-05-02 DIAGNOSIS — E03.9 ACQUIRED HYPOTHYROIDISM: ICD-10-CM

## 2024-05-02 DIAGNOSIS — M72.2 PLANTAR FASCIAL FIBROMATOSIS OF RIGHT FOOT: ICD-10-CM

## 2024-05-02 DIAGNOSIS — F98.8 ATTENTION DEFICIT DISORDER (ADD) WITHOUT HYPERACTIVITY: ICD-10-CM

## 2024-05-02 DIAGNOSIS — Z98.84 HISTORY OF GASTRIC BYPASS: ICD-10-CM

## 2024-05-02 DIAGNOSIS — J45.20 MILD INTERMITTENT ASTHMA WITHOUT COMPLICATION: ICD-10-CM

## 2024-05-02 DIAGNOSIS — Z01.818 PRE-OP EXAMINATION: Primary | ICD-10-CM

## 2024-05-02 DIAGNOSIS — K31.84 GASTROPARESIS: ICD-10-CM

## 2024-05-02 LAB
25(OH)D3 SERPL-MCNC: 40.7 NG/ML
BASOPHILS # BLD: 0.1 K/UL (ref 0–0.2)
BASOPHILS NFR BLD: 1 %
DEPRECATED RDW RBC AUTO: 14.2 % (ref 12.4–15.4)
EOSINOPHIL # BLD: 0.3 K/UL (ref 0–0.6)
EOSINOPHIL NFR BLD: 2.1 %
FERRITIN SERPL IA-MCNC: 74.2 NG/ML (ref 15–150)
FOLATE SERPL-MCNC: >20 NG/ML (ref 4.78–24.2)
HCT VFR BLD AUTO: 43.4 % (ref 36–48)
HGB BLD-MCNC: 14.9 G/DL (ref 12–16)
LYMPHOCYTES # BLD: 3.6 K/UL (ref 1–5.1)
LYMPHOCYTES NFR BLD: 29.6 %
MCH RBC QN AUTO: 30 PG (ref 26–34)
MCHC RBC AUTO-ENTMCNC: 34.4 G/DL (ref 31–36)
MCV RBC AUTO: 87.3 FL (ref 80–100)
MONOCYTES # BLD: 0.7 K/UL (ref 0–1.3)
MONOCYTES NFR BLD: 5.9 %
NEUTROPHILS # BLD: 7.5 K/UL (ref 1.7–7.7)
NEUTROPHILS NFR BLD: 61.4 %
PLATELET # BLD AUTO: 337 K/UL (ref 135–450)
PMV BLD AUTO: 8 FL (ref 5–10.5)
RBC # BLD AUTO: 4.97 M/UL (ref 4–5.2)
TSH SERPL DL<=0.005 MIU/L-ACNC: 1.24 UIU/ML (ref 0.27–4.2)
VIT B12 SERPL-MCNC: 446 PG/ML (ref 211–911)
WBC # BLD AUTO: 12.2 K/UL (ref 4–11)

## 2024-05-02 PROCEDURE — 93000 ELECTROCARDIOGRAM COMPLETE: CPT | Performed by: FAMILY MEDICINE

## 2024-05-02 PROCEDURE — G8427 DOCREV CUR MEDS BY ELIG CLIN: HCPCS | Performed by: FAMILY MEDICINE

## 2024-05-02 PROCEDURE — 99214 OFFICE O/P EST MOD 30 MIN: CPT | Performed by: FAMILY MEDICINE

## 2024-05-02 PROCEDURE — 3046F HEMOGLOBIN A1C LEVEL >9.0%: CPT | Performed by: FAMILY MEDICINE

## 2024-05-02 PROCEDURE — G8417 CALC BMI ABV UP PARAM F/U: HCPCS | Performed by: FAMILY MEDICINE

## 2024-05-02 PROCEDURE — 2022F DILAT RTA XM EVC RTNOPTHY: CPT | Performed by: FAMILY MEDICINE

## 2024-05-02 PROCEDURE — 1036F TOBACCO NON-USER: CPT | Performed by: FAMILY MEDICINE

## 2024-05-02 RX ORDER — ALBUTEROL SULFATE 90 UG/1
2 AEROSOL, METERED RESPIRATORY (INHALATION) 4 TIMES DAILY PRN
Qty: 18 G | Refills: 0 | Status: SHIPPED | OUTPATIENT
Start: 2024-05-02

## 2024-05-02 SDOH — ECONOMIC STABILITY: FOOD INSECURITY: WITHIN THE PAST 12 MONTHS, THE FOOD YOU BOUGHT JUST DIDN'T LAST AND YOU DIDN'T HAVE MONEY TO GET MORE.: NEVER TRUE

## 2024-05-02 SDOH — ECONOMIC STABILITY: FOOD INSECURITY: WITHIN THE PAST 12 MONTHS, YOU WORRIED THAT YOUR FOOD WOULD RUN OUT BEFORE YOU GOT MONEY TO BUY MORE.: NEVER TRUE

## 2024-05-02 SDOH — ECONOMIC STABILITY: INCOME INSECURITY: HOW HARD IS IT FOR YOU TO PAY FOR THE VERY BASICS LIKE FOOD, HOUSING, MEDICAL CARE, AND HEATING?: NOT HARD AT ALL

## 2024-05-02 ASSESSMENT — ENCOUNTER SYMPTOMS
GASTROINTESTINAL NEGATIVE: 1
ALLERGIC/IMMUNOLOGIC NEGATIVE: 1
EYES NEGATIVE: 1
RESPIRATORY NEGATIVE: 1

## 2024-05-02 NOTE — PROGRESS NOTES
Subjective:   PREOPERATIVE EVALUATION     Myla Astudillo is a 43 y.o.  female who presents to the office today for a preoperative consultation at the request of surgeon Dr Jose Guillen who plans on performing R foot plantar fibroma excision.  Date of surgery is not scheduled, but will be within 30 days.    Duration of problem: 6-8 weeks    Assoc sx are: just pain, even to light touch. Very severe     Alleviating factors are:  none    This consultation is requested for the specific conditions prompting preoperative evaluation (i.e. because of potential affect on operative risk):    Diabetes: yes: but control has been excellent since she had gastric sleeve surgery 2018.  Is using ozempic now (2 mg).     Lab Results   Component Value Date/Time    LABA1C 5.9 08/11/2023 10:54 AM    LABA1C 4.9 04/04/2023 09:30 AM    LABA1C 5.3 12/04/2020 11:39 AM      Coronary Artery Disease: no  COPD: no, but has asthma.  No daily med use.  Prn Symbicort for flare ups.  Last use was a week ago (due to pollen counts).  Has albuterol    Tobacco use? no  Recent illness? no    Other notable conditions:    Bipolar with anxiety and ADHD. On multiple meds, including xanax, Buspar, Seroquel, Topamax, Lamictal, methylphenidate.  She has no hx of seizure disorder.  Is more or less stable.    On prilosec for GERD  Bentyl for ibs treatment.  Synthroid for hypothyroid.   Lab Results   Component Value Date    TSH 1.02 04/04/2023      Hx gastric bypass: takes daily Centrum with iron.    Patient Active Problem List   Diagnosis    Allergy to eggs    Vitamin D deficiency    PMS (premenstrual syndrome)    Hypothyroidism    PCOD (polycystic ovarian disease)    Anxiety    ADD (attention deficit disorder)    Bipolar 2 disorder (HCC)    Family history of breast cancer (mother, age 40)    Asthma    Insomnia    Allergic rhinitis    Chronic constipation    Hyperlipidemia, unspecified    NAFLD (nonalcoholic fatty liver disease)    Migraine

## 2024-05-02 NOTE — PATIENT INSTRUCTIONS
Stop ozempic 2 wks prior to surgery.  OK to resume immediately after surgery).    Meds to use the AM of your surgery:   Xanax, Buspar, Clonidine, Symbicort, Prilosec

## 2024-05-03 LAB
ALBUMIN SERPL-MCNC: 4.5 G/DL (ref 3.4–5)
ALBUMIN/GLOB SERPL: 1.7 {RATIO} (ref 1.1–2.2)
ALP SERPL-CCNC: 121 U/L (ref 40–129)
ALT SERPL-CCNC: 33 U/L (ref 10–40)
ANION GAP SERPL CALCULATED.3IONS-SCNC: 14 MMOL/L (ref 3–16)
AST SERPL-CCNC: 26 U/L (ref 15–37)
BILIRUB SERPL-MCNC: 0.4 MG/DL (ref 0–1)
BUN SERPL-MCNC: 18 MG/DL (ref 7–20)
CALCIUM SERPL-MCNC: 9.3 MG/DL (ref 8.3–10.6)
CHLORIDE SERPL-SCNC: 104 MMOL/L (ref 99–110)
CHOLEST SERPL-MCNC: 191 MG/DL (ref 0–199)
CO2 SERPL-SCNC: 24 MMOL/L (ref 21–32)
CREAT SERPL-MCNC: 0.8 MG/DL (ref 0.6–1.1)
CREAT UR-MCNC: 247.1 MG/DL (ref 28–259)
EST. AVERAGE GLUCOSE BLD GHB EST-MCNC: 96.8 MG/DL
GFR SERPLBLD CREATININE-BSD FMLA CKD-EPI: >90 ML/MIN/{1.73_M2}
GLUCOSE SERPL-MCNC: 94 MG/DL (ref 70–99)
HBA1C MFR BLD: 5 %
HDLC SERPL-MCNC: 47 MG/DL (ref 40–60)
IRON SATN MFR SERPL: 34 % (ref 15–50)
IRON SERPL-MCNC: 114 UG/DL (ref 37–145)
LDLC SERPL CALC-MCNC: 114 MG/DL
MICROALBUMIN UR DL<=1MG/L-MCNC: <1.2 MG/DL
MICROALBUMIN/CREAT UR: NORMAL MG/G (ref 0–30)
POTASSIUM SERPL-SCNC: 3.9 MMOL/L (ref 3.5–5.1)
PROT SERPL-MCNC: 7.2 G/DL (ref 6.4–8.2)
SODIUM SERPL-SCNC: 142 MMOL/L (ref 136–145)
TIBC SERPL-MCNC: 338 UG/DL (ref 260–445)
TRIGL SERPL-MCNC: 148 MG/DL (ref 0–150)
VLDLC SERPL CALC-MCNC: 30 MG/DL

## 2024-05-14 RX ORDER — SEMAGLUTIDE 2.68 MG/ML
INJECTION, SOLUTION SUBCUTANEOUS
Qty: 3 ML | Refills: 2 | Status: SHIPPED | OUTPATIENT
Start: 2024-05-14

## 2024-05-16 DIAGNOSIS — E78.2 MIXED HYPERLIPIDEMIA: ICD-10-CM

## 2024-05-16 RX ORDER — PROPRANOLOL HCL 60 MG
60 CAPSULE, EXTENDED RELEASE 24HR ORAL DAILY
Qty: 30 CAPSULE | Refills: 5 | Status: SHIPPED | OUTPATIENT
Start: 2024-05-16

## 2024-05-16 RX ORDER — ATORVASTATIN CALCIUM 40 MG/1
40 TABLET, FILM COATED ORAL DAILY
Qty: 30 TABLET | Refills: 5 | Status: SHIPPED | OUTPATIENT
Start: 2024-05-16

## 2024-05-16 RX ORDER — DESVENLAFAXINE 100 MG/1
TABLET, EXTENDED RELEASE ORAL
Qty: 30 TABLET | Refills: 5 | Status: SHIPPED | OUTPATIENT
Start: 2024-05-16

## 2024-05-16 RX ORDER — DICYCLOMINE HCL 20 MG
TABLET ORAL
Qty: 120 TABLET | Refills: 5 | Status: SHIPPED | OUTPATIENT
Start: 2024-05-16

## 2024-05-17 ENCOUNTER — OFFICE VISIT (OUTPATIENT)
Dept: FAMILY MEDICINE CLINIC | Age: 44
End: 2024-05-17
Payer: COMMERCIAL

## 2024-05-17 ENCOUNTER — PATIENT MESSAGE (OUTPATIENT)
Dept: FAMILY MEDICINE CLINIC | Age: 44
End: 2024-05-17

## 2024-05-17 VITALS
WEIGHT: 283 LBS | HEART RATE: 97 BPM | SYSTOLIC BLOOD PRESSURE: 104 MMHG | DIASTOLIC BLOOD PRESSURE: 70 MMHG | BODY MASS INDEX: 47.09 KG/M2 | TEMPERATURE: 97.8 F | RESPIRATION RATE: 18 BRPM | OXYGEN SATURATION: 98 %

## 2024-05-17 DIAGNOSIS — K58.0 IRRITABLE BOWEL SYNDROME WITH DIARRHEA: Primary | ICD-10-CM

## 2024-05-17 DIAGNOSIS — F31.81 BIPOLAR 2 DISORDER (HCC): ICD-10-CM

## 2024-05-17 DIAGNOSIS — E66.9 TYPE 2 DIABETES MELLITUS WITH OBESITY (HCC): ICD-10-CM

## 2024-05-17 DIAGNOSIS — E11.69 TYPE 2 DIABETES MELLITUS WITH OBESITY (HCC): ICD-10-CM

## 2024-05-17 PROCEDURE — 1036F TOBACCO NON-USER: CPT | Performed by: FAMILY MEDICINE

## 2024-05-17 PROCEDURE — G8417 CALC BMI ABV UP PARAM F/U: HCPCS | Performed by: FAMILY MEDICINE

## 2024-05-17 PROCEDURE — 2022F DILAT RTA XM EVC RTNOPTHY: CPT | Performed by: FAMILY MEDICINE

## 2024-05-17 PROCEDURE — 3044F HG A1C LEVEL LT 7.0%: CPT | Performed by: FAMILY MEDICINE

## 2024-05-17 PROCEDURE — 99214 OFFICE O/P EST MOD 30 MIN: CPT | Performed by: FAMILY MEDICINE

## 2024-05-17 PROCEDURE — G8427 DOCREV CUR MEDS BY ELIG CLIN: HCPCS | Performed by: FAMILY MEDICINE

## 2024-05-17 NOTE — PATIENT INSTRUCTIONS
Reduce Seroquel dose to 200 mg nightly.  We are hoping it can reduce appetite, without sacrificing sleep and mood.    Start taking your evening dose of Topamax earlier- when you start to have food cravings.  If you don't note any help from this, let me know and we can try increasting the evening dose of Topamax.

## 2024-05-17 NOTE — PROGRESS NOTES
Mailed patient yearly paper work.
HRS. Yes ProviderBimal MD   Calcium Citrate-Vitamin D (CITRACAL PETITES/VITAMIN D PO) Take 2 tablets by mouth daily Yes ProviderBimal MD   Multiple Vitamins-Minerals (CENTRUM WOMEN PO) Take 2 tablets by mouth daily Yes ProviderBimal MD   Cetirizine HCl 10 MG CAPS Take 1 tablet by mouth. Yes Martinez Baron MD   Prenatal Multivit-Min-Fe-FA (P D  VITAMINS/FOLIC ACID) TABS Take 1 tablet by mouth daily.  Martinez Baron MD        Social History     Tobacco Use    Smoking status: Never    Smokeless tobacco: Never    Tobacco comments:     congratulated on nonsmoking status.   Substance Use Topics    Alcohol use: Yes     Comment: OCC    Drug use: No       Review of Systems As above     Physical Exam  Vitals and nursing note reviewed.   Constitutional:       General: She is not in acute distress.     Appearance: Normal appearance. She is not ill-appearing.   Pulmonary:      Effort: Pulmonary effort is normal.   Neurological:      General: No focal deficit present.      Mental Status: She is alert and oriented to person, place, and time. Mental status is at baseline.   Psychiatric:         Mood and Affect: Mood normal.         Behavior: Behavior normal.         Thought Content: Thought content normal.         Judgment: Judgment normal.         ASSESSMENT/PLAN:    1. Irritable bowel syndrome with diarrhea  - in a prolonged flare up.  She believes it preceded GLP-1 use.  - has failed bentyl. Will try Xifaxin 550 tid x 14 days      2. Type 2 diabetes mellitus with obesity (HCC)  - A1c is stable on ozempic.  Did not tolerate metformin.  - weight has not declined but she has felt psychological improvement with alleviation of food cravings (and the guilt that can go along with that).      3. Bipolar 2 disorder (HCC)  - feels her mood is more or less stable on current regimen.  We could try reducing Pristiq or Seroquel to see if mood remains stable (these may make weight loss harder). She

## 2024-05-18 ENCOUNTER — PATIENT MESSAGE (OUTPATIENT)
Dept: FAMILY MEDICINE CLINIC | Age: 44
End: 2024-05-18

## 2024-05-18 DIAGNOSIS — F98.8 ATTENTION DEFICIT DISORDER (ADD) WITHOUT HYPERACTIVITY: ICD-10-CM

## 2024-05-20 RX ORDER — NALTREXONE HYDROCHLORIDE 50 MG/1
TABLET, FILM COATED ORAL
Qty: 15 TABLET | Refills: 3 | Status: SHIPPED | OUTPATIENT
Start: 2024-05-20

## 2024-05-20 RX ORDER — METHYLPHENIDATE HYDROCHLORIDE 10 MG/1
TABLET ORAL
Qty: 60 TABLET | Refills: 0 | Status: SHIPPED | OUTPATIENT
Start: 2024-05-20 | End: 2024-06-19

## 2024-05-20 NOTE — TELEPHONE ENCOUNTER
From: Myla Astudillo  To: Dr. Martinez Baron  Sent: 5/17/2024 8:23 PM EDT  Subject: Rx for IBS    No one seems to have the wonder drug for my IBS. ANSELMO tried sending it to [facility], they don’t have it either. I guess we could try Roxana.

## 2024-05-20 NOTE — TELEPHONE ENCOUNTER
From: Myla Astudillo  To: Dr. Martinez Baron  Sent: 5/18/2024 9:10 AM EDT  Subject: Ritalin     Was a refill of my Ritalin sent in? I didn’t think to ask about that when I was there. I had also mentioned when I had Deloris in there that I wanted to talk about going back on naltrexone for joint pain. Funny how I think of these things when I’m not on the spot. Ugh.

## 2024-05-21 RX ORDER — LIRAGLUTIDE 6 MG/ML
INJECTION, SOLUTION SUBCUTANEOUS
COMMUNITY
End: 2024-05-21

## 2024-05-21 RX ORDER — ERENUMAB-AOOE 140 MG/ML
140 INJECTION, SOLUTION SUBCUTANEOUS
COMMUNITY
Start: 2024-03-15 | End: 2024-05-21

## 2024-05-21 NOTE — PROGRESS NOTES
5/20/24 0848 AM:    TI COMPLETED/TS    H&P IN [place] 5/20/24, LABS IN [facility] 5/2/24,  EKG TRACING IN [facility] 5/2/24/TS

## 2024-05-21 NOTE — PROGRESS NOTES
5/21/24 0846 AM:        [facility] PRE-SURGICAL TESTING INSTRUCTIONS                      PRIOR TO PROCEDURE DATE:    1. PLEASE FOLLOW ANY INSTRUCTIONS GIVEN TO YOU PER YOUR SURGEON.      2. Arrange for someone to drive you home and be with you for the first 24 hours after discharge for your safety after your procedure for which you received sedation. Ensure it is someone we can share information with regarding your discharge.     NOTE: At this time ONLY 2 ADULTS may accompany you. NO CHILDREN UNDER AGE OF 16.    One person ENCOURAGED to stay at hospital entire time if outpatient surgery      3. You must contact your surgeon for instructions IF:  You are taking any blood thinners, aspirin, anti-inflammatory or vitamins.   Contact your ordering physician/surgeon for medication instructions as soon as possible, especially if taking blood thinners, aspirin, heart, or diabetic medication. STOP SUPPLEMENTS/VITAMINS/NON-STEROIDAL ANTI-INFLAMMATORY MEDICATION 7 DAYS PRIOR TO PROCEDURE.  There is a change in your physical condition such as a cold, fever, rash, cuts, sores, or any other infection, especially near your surgical site.    4. Do not drink alcohol the day before or day of your procedure.  Do not use any recreational marijuana at least 24 hours or street drugs (heroin, cocaine) at minimum 5 days prior to your procedure.     5. A Pre-Surgical History and Physical MUST be completed WITHIN 30 DAYS OR LESS prior to your procedure.by your Physician or an Urgent Care        THE DAY OF YOUR PROCEDURE:  1.  Follow instructions for ARRIVAL TIME as DIRECTED BY YOUR SURGEON. [place] [id number] [place], [place] [address]     2. Enter the MAIN entrance from [facility] and follow the signs to the free Parking Garage or  Parking (offered free of charge 7 am-5pm).      3. Enter the Main Entrance of the hospital (do not enter from the lower level of the parking garage). Upon entrance, check in with

## 2024-05-23 ENCOUNTER — ANESTHESIA (OUTPATIENT)
Dept: OPERATING ROOM | Age: 44
End: 2024-05-23
Payer: COMMERCIAL

## 2024-05-23 ENCOUNTER — ANESTHESIA EVENT (OUTPATIENT)
Dept: OPERATING ROOM | Age: 44
End: 2024-05-23
Payer: COMMERCIAL

## 2024-05-23 ENCOUNTER — HOSPITAL ENCOUNTER (OUTPATIENT)
Age: 44
Setting detail: OUTPATIENT SURGERY
Discharge: HOME | End: 2024-05-23
Attending: PODIATRIST | Admitting: PODIATRIST
Payer: COMMERCIAL

## 2024-05-23 VITALS
WEIGHT: 291.2 LBS | HEART RATE: 75 BPM | SYSTOLIC BLOOD PRESSURE: 108 MMHG | BODY MASS INDEX: 48.52 KG/M2 | TEMPERATURE: 97.2 F | HEIGHT: 65 IN | RESPIRATION RATE: 17 BRPM | OXYGEN SATURATION: 95 % | DIASTOLIC BLOOD PRESSURE: 60 MMHG

## 2024-05-23 DIAGNOSIS — M86.671 CHRONIC OSTEOMYELITIS OF RIGHT FOOT (HCC): ICD-10-CM

## 2024-05-23 DIAGNOSIS — G89.18 POST-OP PAIN: Primary | ICD-10-CM

## 2024-05-23 DIAGNOSIS — D49.2 SOFT TISSUE TUMOR OF RIGHT FOOT: ICD-10-CM

## 2024-05-23 PROCEDURE — 3700000001 HC ADD 15 MINUTES (ANESTHESIA): Performed by: PODIATRIST

## 2024-05-23 PROCEDURE — 6360000002 HC RX W HCPCS: Performed by: PODIATRIST

## 2024-05-23 PROCEDURE — 88305 TISSUE EXAM BY PATHOLOGIST: CPT

## 2024-05-23 PROCEDURE — 2709999900 HC NON-CHARGEABLE SUPPLY: Performed by: PODIATRIST

## 2024-05-23 PROCEDURE — 6370000000 HC RX 637 (ALT 250 FOR IP)

## 2024-05-23 PROCEDURE — 7100000011 HC PHASE II RECOVERY - ADDTL 15 MIN: Performed by: PODIATRIST

## 2024-05-23 PROCEDURE — 3700000000 HC ANESTHESIA ATTENDED CARE: Performed by: PODIATRIST

## 2024-05-23 PROCEDURE — 64445 NJX AA&/STRD SCIATIC NRV IMG: CPT | Performed by: ANESTHESIOLOGY

## 2024-05-23 PROCEDURE — 3600000012 HC SURGERY LEVEL 2 ADDTL 15MIN: Performed by: PODIATRIST

## 2024-05-23 PROCEDURE — 2580000003 HC RX 258: Performed by: ANESTHESIOLOGY

## 2024-05-23 PROCEDURE — 7100000000 HC PACU RECOVERY - FIRST 15 MIN: Performed by: PODIATRIST

## 2024-05-23 PROCEDURE — 3600000002 HC SURGERY LEVEL 2 BASE: Performed by: PODIATRIST

## 2024-05-23 PROCEDURE — 6360000002 HC RX W HCPCS

## 2024-05-23 PROCEDURE — 7100000001 HC PACU RECOVERY - ADDTL 15 MIN: Performed by: PODIATRIST

## 2024-05-23 PROCEDURE — 7100000010 HC PHASE II RECOVERY - FIRST 15 MIN: Performed by: PODIATRIST

## 2024-05-23 PROCEDURE — 6360000002 HC RX W HCPCS: Performed by: ANESTHESIOLOGY

## 2024-05-23 PROCEDURE — 2500000003 HC RX 250 WO HCPCS

## 2024-05-23 DEVICE — PATCH AMNION 2 LAYR PROTCT 4 X 4CM STERISHIELD II: Type: IMPLANTABLE DEVICE | Site: FOOT | Status: FUNCTIONAL

## 2024-05-23 RX ORDER — OXYCODONE HYDROCHLORIDE AND ACETAMINOPHEN 5; 325 MG/1; MG/1
1 TABLET ORAL EVERY 6 HOURS PRN
Qty: 20 TABLET | Refills: 0 | Status: SHIPPED | OUTPATIENT
Start: 2024-05-23 | End: 2024-05-23

## 2024-05-23 RX ORDER — PROPOFOL 10 MG/ML
INJECTION, EMULSION INTRAVENOUS PRN
Status: DISCONTINUED | OUTPATIENT
Start: 2024-05-23 | End: 2024-05-23 | Stop reason: SDUPTHER

## 2024-05-23 RX ORDER — CEFAZOLIN SODIUM 1 G/3ML
INJECTION, POWDER, FOR SOLUTION INTRAMUSCULAR; INTRAVENOUS PRN
Status: DISCONTINUED | OUTPATIENT
Start: 2024-05-23 | End: 2024-05-23 | Stop reason: SDUPTHER

## 2024-05-23 RX ORDER — ONDANSETRON 2 MG/ML
INJECTION INTRAMUSCULAR; INTRAVENOUS PRN
Status: DISCONTINUED | OUTPATIENT
Start: 2024-05-23 | End: 2024-05-23 | Stop reason: SDUPTHER

## 2024-05-23 RX ORDER — DEXAMETHASONE SODIUM PHOSPHATE 4 MG/ML
INJECTION, SOLUTION INTRA-ARTICULAR; INTRALESIONAL; INTRAMUSCULAR; INTRAVENOUS; SOFT TISSUE PRN
Status: DISCONTINUED | OUTPATIENT
Start: 2024-05-23 | End: 2024-05-23 | Stop reason: SDUPTHER

## 2024-05-23 RX ORDER — BUPIVACAINE HYDROCHLORIDE 2.5 MG/ML
INJECTION, SOLUTION EPIDURAL; INFILTRATION; INTRACAUDAL PRN
Status: DISCONTINUED | OUTPATIENT
Start: 2024-05-23 | End: 2024-05-23 | Stop reason: HOSPADM

## 2024-05-23 RX ORDER — IBUPROFEN 400 MG/1
400 TABLET ORAL
Status: COMPLETED | OUTPATIENT
Start: 2024-05-23 | End: 2024-05-23

## 2024-05-23 RX ORDER — OXYCODONE HYDROCHLORIDE AND ACETAMINOPHEN 5; 325 MG/1; MG/1
1 TABLET ORAL EVERY 6 HOURS PRN
Qty: 20 TABLET | Refills: 0 | Status: SHIPPED | OUTPATIENT
Start: 2024-05-23 | End: 2024-05-28

## 2024-05-23 RX ORDER — LIDOCAINE HYDROCHLORIDE 20 MG/ML
INJECTION, SOLUTION INFILTRATION; PERINEURAL PRN
Status: DISCONTINUED | OUTPATIENT
Start: 2024-05-23 | End: 2024-05-23 | Stop reason: SDUPTHER

## 2024-05-23 RX ORDER — MIDAZOLAM HYDROCHLORIDE 1 MG/ML
INJECTION INTRAMUSCULAR; INTRAVENOUS
Status: COMPLETED
Start: 2024-05-23 | End: 2024-05-23

## 2024-05-23 RX ORDER — SODIUM CHLORIDE 9 MG/ML
INJECTION, SOLUTION INTRAVENOUS PRN
Status: DISCONTINUED | OUTPATIENT
Start: 2024-05-23 | End: 2024-05-23 | Stop reason: HOSPADM

## 2024-05-23 RX ORDER — KETOROLAC TROMETHAMINE 30 MG/ML
INJECTION, SOLUTION INTRAMUSCULAR; INTRAVENOUS PRN
Status: DISCONTINUED | OUTPATIENT
Start: 2024-05-23 | End: 2024-05-23 | Stop reason: SDUPTHER

## 2024-05-23 RX ORDER — FENTANYL CITRATE 50 UG/ML
25 INJECTION, SOLUTION INTRAMUSCULAR; INTRAVENOUS EVERY 5 MIN PRN
Status: DISCONTINUED | OUTPATIENT
Start: 2024-05-23 | End: 2024-05-23 | Stop reason: HOSPADM

## 2024-05-23 RX ORDER — ACETAMINOPHEN 325 MG/1
650 TABLET ORAL
Status: DISCONTINUED | OUTPATIENT
Start: 2024-05-23 | End: 2024-05-23 | Stop reason: HOSPADM

## 2024-05-23 RX ORDER — ROPIVACAINE HYDROCHLORIDE 5 MG/ML
INJECTION, SOLUTION EPIDURAL; INFILTRATION; PERINEURAL
Status: COMPLETED | OUTPATIENT
Start: 2024-05-23 | End: 2024-05-23

## 2024-05-23 RX ORDER — ALPRAZOLAM 0.25 MG/1
2 TABLET ORAL NIGHTLY PRN
COMMUNITY

## 2024-05-23 RX ORDER — SODIUM CHLORIDE 0.9 % (FLUSH) 0.9 %
5-40 SYRINGE (ML) INJECTION EVERY 12 HOURS SCHEDULED
Status: DISCONTINUED | OUTPATIENT
Start: 2024-05-23 | End: 2024-05-23 | Stop reason: HOSPADM

## 2024-05-23 RX ORDER — IBUPROFEN 400 MG/1
800 TABLET ORAL 3 TIMES DAILY PRN
Qty: 15 TABLET | Refills: 0 | Status: SHIPPED | OUTPATIENT
Start: 2024-05-23 | End: 2024-05-23 | Stop reason: HOSPADM

## 2024-05-23 RX ORDER — LABETALOL HYDROCHLORIDE 5 MG/ML
10 INJECTION, SOLUTION INTRAVENOUS
Status: DISCONTINUED | OUTPATIENT
Start: 2024-05-23 | End: 2024-05-23 | Stop reason: HOSPADM

## 2024-05-23 RX ORDER — SODIUM CHLORIDE, SODIUM LACTATE, POTASSIUM CHLORIDE, CALCIUM CHLORIDE 600; 310; 30; 20 MG/100ML; MG/100ML; MG/100ML; MG/100ML
INJECTION, SOLUTION INTRAVENOUS CONTINUOUS
Status: DISCONTINUED | OUTPATIENT
Start: 2024-05-23 | End: 2024-05-23 | Stop reason: HOSPADM

## 2024-05-23 RX ORDER — PROCHLORPERAZINE EDISYLATE 5 MG/ML
5 INJECTION INTRAMUSCULAR; INTRAVENOUS
Status: DISCONTINUED | OUTPATIENT
Start: 2024-05-23 | End: 2024-05-23 | Stop reason: HOSPADM

## 2024-05-23 RX ORDER — NALOXONE HYDROCHLORIDE 0.4 MG/ML
INJECTION, SOLUTION INTRAMUSCULAR; INTRAVENOUS; SUBCUTANEOUS PRN
Status: DISCONTINUED | OUTPATIENT
Start: 2024-05-23 | End: 2024-05-23 | Stop reason: HOSPADM

## 2024-05-23 RX ORDER — FENTANYL CITRATE 50 UG/ML
INJECTION, SOLUTION INTRAMUSCULAR; INTRAVENOUS PRN
Status: DISCONTINUED | OUTPATIENT
Start: 2024-05-23 | End: 2024-05-23 | Stop reason: SDUPTHER

## 2024-05-23 RX ORDER — FENTANYL CITRATE 50 UG/ML
INJECTION, SOLUTION INTRAMUSCULAR; INTRAVENOUS
Status: COMPLETED
Start: 2024-05-23 | End: 2024-05-23

## 2024-05-23 RX ORDER — SODIUM CHLORIDE 0.9 % (FLUSH) 0.9 %
5-40 SYRINGE (ML) INJECTION PRN
Status: DISCONTINUED | OUTPATIENT
Start: 2024-05-23 | End: 2024-05-23 | Stop reason: HOSPADM

## 2024-05-23 RX ORDER — ROPIVACAINE HYDROCHLORIDE 5 MG/ML
INJECTION, SOLUTION EPIDURAL; INFILTRATION; PERINEURAL
Status: COMPLETED
Start: 2024-05-23 | End: 2024-05-23

## 2024-05-23 RX ORDER — MIDAZOLAM HYDROCHLORIDE 1 MG/ML
INJECTION INTRAMUSCULAR; INTRAVENOUS PRN
Status: DISCONTINUED | OUTPATIENT
Start: 2024-05-23 | End: 2024-05-23 | Stop reason: SDUPTHER

## 2024-05-23 RX ORDER — IPRATROPIUM BROMIDE AND ALBUTEROL SULFATE 2.5; .5 MG/3ML; MG/3ML
1 SOLUTION RESPIRATORY (INHALATION)
Status: DISCONTINUED | OUTPATIENT
Start: 2024-05-23 | End: 2024-05-23 | Stop reason: HOSPADM

## 2024-05-23 RX ORDER — ONDANSETRON 2 MG/ML
4 INJECTION INTRAMUSCULAR; INTRAVENOUS
Status: DISCONTINUED | OUTPATIENT
Start: 2024-05-23 | End: 2024-05-23 | Stop reason: HOSPADM

## 2024-05-23 RX ADMIN — FENTANYL CITRATE 50 MCG: 50 INJECTION, SOLUTION INTRAMUSCULAR; INTRAVENOUS at 11:53

## 2024-05-23 RX ADMIN — ROPIVACAINE HYDROCHLORIDE 30 ML: 5 INJECTION, SOLUTION EPIDURAL; INFILTRATION; PERINEURAL at 11:25

## 2024-05-23 RX ADMIN — FENTANYL CITRATE 50 MCG: 50 INJECTION, SOLUTION INTRAMUSCULAR; INTRAVENOUS at 11:45

## 2024-05-23 RX ADMIN — IBUPROFEN 400 MG: 400 TABLET, FILM COATED ORAL at 13:04

## 2024-05-23 RX ADMIN — LIDOCAINE HYDROCHLORIDE 100 MG: 20 INJECTION, SOLUTION INFILTRATION; PERINEURAL at 11:45

## 2024-05-23 RX ADMIN — DEXAMETHASONE SODIUM PHOSPHATE 8 MG: 4 INJECTION INTRA-ARTICULAR; INTRALESIONAL; INTRAMUSCULAR; INTRAVENOUS; SOFT TISSUE at 11:52

## 2024-05-23 RX ADMIN — SODIUM CHLORIDE, POTASSIUM CHLORIDE, SODIUM LACTATE AND CALCIUM CHLORIDE: 600; 310; 30; 20 INJECTION, SOLUTION INTRAVENOUS at 10:43

## 2024-05-23 RX ADMIN — MIDAZOLAM HYDROCHLORIDE 2 MG: 2 INJECTION, SOLUTION INTRAMUSCULAR; INTRAVENOUS at 11:23

## 2024-05-23 RX ADMIN — CEFAZOLIN SODIUM 3 G: 1 POWDER, FOR SOLUTION INTRAMUSCULAR; INTRAVENOUS at 11:52

## 2024-05-23 RX ADMIN — PROPOFOL 200 MG: 10 INJECTION, EMULSION INTRAVENOUS at 11:45

## 2024-05-23 RX ADMIN — FENTANYL CITRATE 100 MCG: 50 INJECTION, SOLUTION INTRAMUSCULAR; INTRAVENOUS at 11:23

## 2024-05-23 RX ADMIN — ONDANSETRON 4 MG: 2 INJECTION INTRAMUSCULAR; INTRAVENOUS at 11:45

## 2024-05-23 RX ADMIN — KETOROLAC TROMETHAMINE 30 MG: 30 INJECTION, SOLUTION INTRAMUSCULAR; INTRAVENOUS at 12:07

## 2024-05-23 ASSESSMENT — PAIN DESCRIPTION - PAIN TYPE: TYPE: ACUTE PAIN

## 2024-05-23 ASSESSMENT — PAIN DESCRIPTION - LOCATION
LOCATION: FOOT
LOCATION: FOOT
LOCATION: HEAD

## 2024-05-23 ASSESSMENT — PAIN DESCRIPTION - ORIENTATION
ORIENTATION: ANTERIOR;UPPER
ORIENTATION: RIGHT
ORIENTATION: RIGHT

## 2024-05-23 ASSESSMENT — PAIN SCALES - GENERAL
PAINLEVEL_OUTOF10: 0
PAINLEVEL_OUTOF10: 6

## 2024-05-23 ASSESSMENT — PAIN DESCRIPTION - DESCRIPTORS: DESCRIPTORS: ACHING

## 2024-05-23 ASSESSMENT — PAIN - FUNCTIONAL ASSESSMENT: PAIN_FUNCTIONAL_ASSESSMENT: 0-10

## 2024-05-23 NOTE — BRIEF OP NOTE
Brief Postoperative Note      Patient: Myla Astudillo  YOB: 1980  MRN: [id number]    Date of Procedure: 5/23/2024    Pre-Op Diagnosis Codes:     * Soft tissue tumor of right foot [D49.2]     * Chronic osteomyelitis of right foot (HCC) [M86.671]    Post-Op Diagnosis: Same       Procedure(s):  RIGHT FOOT SOFT TISSUE TUMOR EXCISION    Surgeon(s):  Jose Guillen DPM    Assistant:  Resident: Fouzia Shaver DPM    Anesthesia: General    Materials: 3-0 Nylon, 3-0 Vicryl, 2 x 4x4 cm Sterishield amnion graft implanted to the plantar right foot.     Hemostasis:  Pneumatic ankle tourniquet at 250 mmHg for 12 minutes, electrocautery, anatomic dissection.     Injectables:  10 cc of 0.5% Marcaine plain    Estimated Blood Loss (mL): Minimal    Complications: None    Specimens:   ID Type Source Tests Collected by Time Destination   A : Right Foot Fibroma Tissue Tissue SURGICAL PATHOLOGY Jose Guillen DPM 5/23/2024 1157        Implants:  Implant Name Type Inv. Item Serial No.  Lot No. LRB No. Used Action   PATCH AMNION 2 LAYR PROTCT 4 X 4CM STERISHIELD II - [id number]  PATCH AMNION 2 LAYR PROTCT 4 X 4CM STERISHIELD II [id number] [employer]  Right 1 Implanted   PATCH AMNION 2 LAYR PROTCT 4 X 4CM STER[employer]ELD II - [id number]  PATCH AMNION 2 LAYR PROTCT 4 X 4CM STERISHIELD II [id number] [employer]  Right 1 Implanted         Drains: * No LDAs found *    Findings:  Infection Present At Time Of Surgery (PATOS) (choose all levels that have infection present):  No infection present  Other Findings: Hypertrophy of the medial central band of the plantar fascia consistent with plantar fibroma  This procedure was not performed to treat primary cutaneous melanoma through wide local excision    Fouzia Shaver DPM   Podiatric Resident PGY2  Pager [phone] or [employer]  5/23/2024, 12:15 PM   Electronically signed by Fouzia Shaver DPM on 5/23/2024 at 12:11 PM

## 2024-05-23 NOTE — OP NOTE
Operative Note      Patient: Myla Astudillo  YOB: 1980  MRN: [id number]    Date of Procedure: 5/23/2024    Pre-Op Diagnosis Codes:     * Soft tissue tumor of right foot [D49.2]     * Chronic osteomyelitis of right foot (HCC) [M86.671]    Post-Op Diagnosis: Same       Procedure(s):  RIGHT FOOT SOFT TISSUE TUMOR EXCISION    Surgeon(s):  Jose Guillen DPM    Assistant:   Resident: Fouzia Shaver DPM    Anesthesia: General    Materials: 3-0 Nylon, 3-0 Vicryl, 2 x 4x4 cm Sterishield amnion graft implanted to the plantar right foot.      Hemostasis:  Pneumatic ankle tourniquet at 250 mmHg for 12 minutes, electrocautery, anatomic dissection.      Injectables:  10 cc of 0.5% Marcaine plain  Estimated Blood Loss (mL): Minimal    Complications: None    Specimens:   ID Type Source Tests Collected by Time Destination   A : Right Foot Fibroma Tissue Tissue SURGICAL PATHOLOGY Jose Guillen DPM 5/23/2024 1157        Implants:  Implant Name Type Inv. Item Serial No.  Lot No. LRB No. Used Action   PATCH AMNION 2 LAYR PROTCT 4 X 4CM STERISHIELD II - [id number]  PATCH AMNION 2 LAYR PROTCT 4 X 4CM STERISHIELD II [id number] [employer]  Right 1 Implanted   PATCH AMNION 2 LAYR PROTCT 4 X 4CM STERISHIELD II - [id number]  PATCH AMNION 2 LAYR PROTCT 4 X 4CM STERISHIELD II [id number] [employer]  Right 1 Implanted         Drains: * No LDAs found *    Findings:  Findings:  Infection Present At Time Of Surgery (PATOS) (choose all levels that have infection present):  No infection present  Other Findings: Hypertrophy of the medial central band of the plantar fascia consistent with plantar fibroma    INDICATIONS FOR PROCEDURE: This patient has signs and symptoms clinically and radiographically consistent with the above mentioned preoperative diagnosis. Having failed conservative treatment, it was determined that the patient would benefit from surgical intervention. All potential risks,  signed by Fouzia Shaver DPM on 5/23/2024 at 12:35 PM

## 2024-05-23 NOTE — PROGRESS NOTES
Pt a/o x 4, sleepy, VSS on room air.  Pt tolerating po fluids.  Pt denies pain and denies nausea.      Pt declined crutches and walker - states she has a roll-ator at home.

## 2024-05-23 NOTE — ANESTHESIA PRE PROCEDURE
\"POCCL\", \"POCBUN\", \"POCHEMO\", \"POCHCT\" in the last 72 hours.    Coags: No results found for: \"PROTIME\", \"INR\", \"APTT\"    HCG (If Applicable):   Lab Results   Component Value Date    PREGTESTUR Negative 05/16/2013        ABGs: No results found for: \"PHART\", \"PO2ART\", \"YJH7WSO\", \"MXH0VPN\", \"BEART\", \"E8NUUWIV\"     Type & Screen (If Applicable):  Lab Results   Component Value Date    LABABO AB 05/14/2010       Drug/Infectious Status (If Applicable):  No results found for: \"HIV\", \"HEPCAB\"    COVID-19 Screening (If Applicable): No results found for: \"COVID19\"        Anesthesia Evaluation  Patient summary reviewed and Nursing notes reviewed  Airway: Mallampati: II  TM distance: >3 FB   Neck ROM: full  Mouth opening: > = 3 FB   Dental: normal exam         Pulmonary: breath sounds clear to auscultation  (+)     sleep apnea:       asthma:                            Cardiovascular:  Exercise tolerance: good (>4 METS)          Rhythm: regular  Rate: normal                    Neuro/Psych:   (+) neuromuscular disease:, headaches:, psychiatric history:            GI/Hepatic/Renal:   (+) hiatal hernia, GERD:, PUD, liver disease:          Endo/Other:    (+) Diabetes, hypothyroidism::..                 Abdominal:   (+) obese          Vascular:          Other Findings:       Anesthesia Plan      general and regional     ASA 3       Induction: intravenous.      Anesthetic plan and risks discussed with patient.    Use of blood products discussed with patient whom.    Plan discussed with surgical team and CRNA.                Matthieu Sommer MD   5/23/2024

## 2024-05-23 NOTE — PROGRESS NOTES
PACU Transfer to [facility]    Vitals:    05/23/24 1300   BP: 114/73   Pulse: 75   Resp: 16   Temp: 97.6 °F (36.4 °C)   SpO2: 94%         Intake/Output Summary (Last 24 hours) at 5/23/2024 1324  Last data filed at 5/23/2024 1245  Gross per 24 hour   Intake 860 ml   Output --   Net 860 ml       Pain assessment:  present - adequately treated  Pain Level 6 (pt has headache / no pain in right foot)    Patient transferred to care of MICHELLE RN.    5/23/2024 1:24 PM

## 2024-05-23 NOTE — PROGRESS NOTES
Patient admitted to PACU # 12 from OR at 1216 post RIGHT FOOT SOFT TISSUE TUMOR EXCISION - Right  per Jose Guillen DPM .  Attached to PACU monitoring system and report received from anesthesia provider.  Patient was reported to be hemodynamically stable during procedure.  Patient drowsy on admission and denied pain.    Pt has rolled gauze and ace wrap to right foot.  No drains.

## 2024-05-23 NOTE — H&P
Date of Surgery Update:  Myla Astudillo was seen, history and physical examination reviewed, and patient examined by Martinez Baron on 5/06/24 There have been no significant clinical changes since the completion of the previous history and physical.    The nature of the procedure, possible complications, alternative forms of therapy, post-op course, and post-op goals have been explained to patient (or appropriate guardian) and patient's family and understanding was verbalized.  All questions have been answered. The consent has been signed.  Patient's surgical site has been marked. Patient wishes to proceed with surgery.    Discussed with Dr. Jose Guillen DPM.    Fouzia Shaver DPM   Podiatric Resident PGY2  Pager [phone] or [place]  5/23/2024, 10:13 AM

## 2024-05-23 NOTE — DISCHARGE INSTRUCTIONS
Dr. Jose Guillen   [address] [place] [address]  Phone: [phone]            Post-Op Discharge Instructions    Fluids and Diet  1. Begin with clear liquids, bouillon, dry toast, soda crackers  2. If NOT nauseated, you may resume a regular diet when you desire    Medications  1. Take prescription medications only as directed  2. If pain is not severe, you may take the non-prescription medication that you normally take.  3. If any side effects or adverse reactions occur, discontinue the medication and contact your doctor.  4. Review the patient drug information leaflet, when provided, before you begin taking the medication    Ambulation and Activity  1. You are advised to go directly home from the hospital  2. Use the prescribed walking aids and surgical shoe.  3. Heel weightbearing to right foot.  4. Do not lift or move heavy objects  5. Do not Drive    Post Anesthesia Instructions  1. Do not drive or operate machinery  2. Do not consume alcohol, tranquilizers, sleeping medications, or a non-prescription pain medication  3. Do not make important decisions  4. You should have someone home with you tonight    Care of the Operative Site  1. Keep cast or bandage clean, dry, and intact  2. Do not shower  3. Do not remove bandage  4. Elevate Operative extremity as much as possible to reduce swelling and discomfort for the first 72 hours  5. Apply ice bag wrapped in thick towel over bandage 20 minutes of every hour for the first 72 hours while awake  6. Do not attempt to put anything between the cast or dressing and skin, some itching is normal    Special Instructions: Call doctor immediately if you develop any of the followin. Fever over 100 degrees by mouth - take your temperature daily  2. Pain not relieved by medication ordered  3. Swelling, increased redness, warmth, or hardness around operative area  4. Numb, tingling or cold toes  5. Toe(s) become white or bluish  6. Bandage becomes wet, soiled,  how best to manage this side effect.    NARCOTIC SAFETY:  Your pain medicine is only for you to take.  Safely store your medicines.  Store pills up high and out of reach of children and pets.  Ensure safety caps are snapped tightly  Keep track of how many pills you have left    Unused medication can be disposed of by taking them to a drop-off box or take-back program that is authorized by the [employer].  Access to a site near you can be found on the [employer] website ([employer]iversion[employer].gov).    If you have a CPAP machine, it is very important that you use it daily during all periods of sleep and daytime rest during your recovery at home.  Surgery and Anesthesia place a significant amount of stress on your body.  Using your CPAP will help keep you safe and lessen the negative effects of that stress.    FOLLOW-UP RECOVERY CARE:    Watch for these possible complications, symptoms, or side effects of anesthesia.  Call physician if they or any other problems occur:  Signs of INFECTION   > Fever over 101°     > Redness, swelling, hardness or warmth at the operative site   >Foul smelling or cloudy drainage at the operative site   Unrelieved PAIN  Unrelieved NAUSEA  Blood soaked dressing.  (Some oozing may be normal)  Inability to urinate      Numb, pale, blue, cold or tingling extremity    The above instructions were reviewed with patient/significant other.  The following additional patient specific information was reviewed with the patient/significant other:  [x]Procedure/physician specific instructions  []Medication information sheet(S) including potential side effects  []Tiara’s egress test  []Pain Ball management  []FAQ Catheter associated blood stream infections  []FAQ Surgical Site Infections  []Other-    I have read and understand the instructions given to me: ____________________________________________   (Patient/S.O. Signature)            Date/time 5/23/2024 12:32 PM         If you smoke STOP.

## 2024-05-23 NOTE — ANESTHESIA POSTPROCEDURE EVALUATION
Department of Anesthesiology  Postprocedure Note    Patient: Myla Astudillo  MRN: [id number]  YOB: 1980  Date of evaluation: 5/23/2024    Procedure Summary       Date: 05/23/24 Room / Location: [address]    Anesthesia Start: 1141 Anesthesia Stop: 1220    Procedure: RIGHT FOOT SOFT TISSUE TUMOR EXCISION (Right: Foot) Diagnosis:       Soft tissue tumor of right foot      Chronic osteomyelitis of right foot (HCC)      (Soft tissue tumor of right foot [D49.2])      (Chronic osteomyelitis of right foot (HCC) [M86.671])    Surgeons: Jose Guillen DPM Responsible Provider: Matthieu Sommer MD    Anesthesia Type: General ASA Status: 3            Anesthesia Type: General    Ana Phase I: Ana Score: 8    Ana Phase II:      Anesthesia Post Evaluation    Patient location during evaluation: PACU  Patient participation: complete - patient participated  Level of consciousness: awake  Pain score: 2  Nausea & Vomiting: no nausea and no vomiting  Cardiovascular status: blood pressure returned to baseline  Respiratory status: acceptable  Hydration status: euvolemic  Pain management: adequate    No notable events documented.

## 2024-05-23 NOTE — PROGRESS NOTES
Ambulatory Surgery/Procedure Discharge Note    Vitals:    05/23/24 1325   BP: 108/60   Pulse: 75   Resp: 17   Temp: 97.2 °F (36.2 °C)   SpO2: 95%       In: 920 [P.O.:120; I.V.:800]  Out: -     Restroom use offered before discharge.  Yes    Pain assessment:  none  Pain Level: 0    Pt and  states \"ready to go home\". Pt alert and oriented x4. IV removed. Denies N/V or pain. Right foot dressing-ACE bandage dressing C,D,I. Right foot in surgical shoe.. Pt tolerating po intake. Discharge instructions given to pt and  with pt permission. Pt and  verbalized understanding of all instructions. Left with all belongings, 2 prescriptions, and discharge instructions.     Patient discharged to home/self care. Patient discharged via wheel chair by transporter to waiting family.

## 2024-05-23 NOTE — PROGRESS NOTES
Procedure:  Right popliteal block  MD: Dr. Sommer  Timeout performed.  Pt monitored closely on heart monitor, 2L NC, continuous pulse oximetry, EtCO2, and frequent BPs.   Pt remained alert and oriented x4. pt tolerated procedure well.

## 2024-05-23 NOTE — ANESTHESIA PROCEDURE NOTES
Peripheral Block    Patient location during procedure: pre-op  Reason for block: post-op pain management and at surgeon's request  Start time: 5/23/2024 11:25 AM  End time: 5/23/2024 11:30 AM  Staffing  Performed: anesthesiologist   Anesthesiologist: Matthieu Sommer MD  Performed by: Matthieu Sommer MD  Authorized by: Matthieu Sommer MD    Preanesthetic Checklist  Completed: patient identified, IV checked, site marked, risks and benefits discussed, surgical/procedural consents, equipment checked, pre-op evaluation, timeout performed, anesthesia consent given, oxygen available, monitors applied/VS acknowledged, fire risk safety assessment completed and verbalized and blood product R/B/A discussed and consented  Peripheral Block   Patient position: supine  Prep: ChloraPrep  Provider prep: mask and sterile gloves  Patient monitoring: cardiac monitor, continuous pulse ox, continuous capnometry, frequent blood pressure checks, IV access, oxygen and responsive to questions  Block type: Sciatic  Popliteal  Laterality: right  Injection technique: single-shot  Guidance: ultrasound guided    Needle   Needle type: insulated echogenic nerve stimulator needle   Needle gauge: 22 G  Needle localization: ultrasound guidance  Needle length: 8 cm  Assessment   Injection assessment: negative aspiration for heme, no paresthesia on injection, local visualized surrounding nerve on ultrasound and no intravascular symptoms  Paresthesia pain: none  Slow fractionated injection: yes  Hemodynamics: stable  Outcomes: uncomplicated and patient tolerated procedure well    Additional Notes  0.5% ropivacaine 30 ml injected in 5 ml increments with negative aspiration between. No complications.  Medications Administered  ropivacaine (NAROPIN) injection 0.5% - Perineural   30 mL - 5/23/2024 11:25:00 AM

## 2024-05-31 RX ORDER — OXYCODONE HYDROCHLORIDE AND ACETAMINOPHEN 5; 325 MG/1; MG/1
1 TABLET ORAL EVERY 6 HOURS PRN
Qty: 12 TABLET | Refills: 0 | Status: SHIPPED | OUTPATIENT
Start: 2024-05-31 | End: 2024-06-03

## 2024-05-31 RX ORDER — IBUPROFEN 800 MG/1
800 TABLET ORAL 2 TIMES DAILY PRN
Qty: 60 TABLET | Refills: 0 | Status: SHIPPED | OUTPATIENT
Start: 2024-05-31

## 2024-06-03 ENCOUNTER — TELEPHONE (OUTPATIENT)
Dept: FAMILY MEDICINE CLINIC | Age: 44
End: 2024-06-03

## 2024-06-03 DIAGNOSIS — K58.0 IRRITABLE BOWEL SYNDROME WITH DIARRHEA: Primary | ICD-10-CM

## 2024-06-03 NOTE — TELEPHONE ENCOUNTER
PA started with [facility] rx for the patient to be on Xifaxan.  Should receive fax within 72 hours.  Office visit notes were faxed to [facility] at [phone]  Phone:[phone]  Case# [id number]

## 2024-06-03 NOTE — TELEPHONE ENCOUNTER
Fax came for pre auth for Wegovy.  Need to see if the patient is going on Wegovy then if so needs sent to our PA team.  LVM to see if the patient is wanting to go on Wegovy.

## 2024-06-04 ENCOUNTER — TELEPHONE (OUTPATIENT)
Dept: ADMINISTRATIVE | Age: 44
End: 2024-06-04

## 2024-06-04 NOTE — TELEPHONE ENCOUNTER
Tracy Goodwin MA     RS    6/3/24  4:19 PM  Note     Patient has already been approved to use Wegovy until Ozempic can be purchased.

## 2024-06-04 NOTE — TELEPHONE ENCOUNTER
There was a PA received VIA CMM FOR WEGOVY 2.4MG/0.75ML, but there is not a current RX on file.  (Key: [id number])     If you want PA please submit new RX, and resend this PA request back to the pool    If this requires a response please respond to the pool ( P MHCX PSC MEDICATION PRE-AUTH).      Thank you please advise patient.

## 2024-06-06 RX ORDER — ELUXADOLINE 75 MG/1
1 TABLET, FILM COATED ORAL
Qty: 60 TABLET | Refills: 2 | Status: SHIPPED | OUTPATIENT
Start: 2024-06-06 | End: 2024-09-04

## 2024-06-06 NOTE — TELEPHONE ENCOUNTER
Called to do prior auth on Wegovy and this is planned excempt meaning it will not be approved.   Patient will have to wait on or find a pharmacy to get Ozempic until shortage is done.  Called and lvm for the patient to call back to discuss.

## 2024-06-06 NOTE — TELEPHONE ENCOUNTER
Please notify Myla Astudillo that I have orderred the Viberzi for diarrhea IBS, in keeping with her united insurance formulary.    Take twice daily with food.    Stop using if you have constipation.  It is a controlled medicine (like pain meds and xanax) because it work on opioid receptors in the gut to slow motility.  Sent to [facility].    The cheapest I have seen compounded ozempic is at [facility]- about $240 monthly for the 1 mg dose of Ozempic, I think.

## 2024-06-06 NOTE — TELEPHONE ENCOUNTER
Called insurance to see if the patient was approved or not on Xifaxan.  Patient denied.  Patient has to try and fail Viberzi first.  Call routed to see if  will order this medication.

## 2024-06-07 NOTE — TELEPHONE ENCOUNTER
Called and spoke to patient .  He will have the patient call office back so I can go over medication changes.

## 2024-06-11 ENCOUNTER — TELEPHONE (OUTPATIENT)
Dept: ADMINISTRATIVE | Age: 44
End: 2024-06-11

## 2024-06-11 NOTE — TELEPHONE ENCOUNTER
Submitted PA for Viberzi 75MG tablets  Via CM Key: [id number] STATUS: PENDING.    Follow up done daily; if no decision with in three days we will refax.  If another three days goes by with no decision will call the insurance for status.

## 2024-06-19 DIAGNOSIS — F51.04 PSYCHOPHYSIOLOGICAL INSOMNIA: ICD-10-CM

## 2024-06-19 DIAGNOSIS — F98.8 ATTENTION DEFICIT DISORDER (ADD) WITHOUT HYPERACTIVITY: ICD-10-CM

## 2024-06-19 RX ORDER — METHYLPHENIDATE HYDROCHLORIDE 10 MG/1
TABLET ORAL
Qty: 60 TABLET | Refills: 0 | Status: SHIPPED | OUTPATIENT
Start: 2024-06-19 | End: 2024-07-19

## 2024-06-19 RX ORDER — CLONIDINE HYDROCHLORIDE 0.1 MG/1
TABLET ORAL
Qty: 60 TABLET | Refills: 5 | Status: SHIPPED | OUTPATIENT
Start: 2024-06-19

## 2024-06-19 RX ORDER — QUETIAPINE 200 MG/1
TABLET, FILM COATED, EXTENDED RELEASE ORAL
Qty: 60 TABLET | Refills: 5 | Status: SHIPPED | OUTPATIENT
Start: 2024-06-19

## 2024-07-08 DIAGNOSIS — F41.9 ANXIETY: Primary | ICD-10-CM

## 2024-07-08 RX ORDER — ALPRAZOLAM 2 MG/1
TABLET ORAL
Qty: 90 TABLET | Refills: 0 | Status: SHIPPED | OUTPATIENT
Start: 2024-07-08 | End: 2024-10-06

## 2024-07-11 ENCOUNTER — PATIENT MESSAGE (OUTPATIENT)
Dept: FAMILY MEDICINE CLINIC | Age: 44
End: 2024-07-11

## 2024-07-16 ENCOUNTER — TELEPHONE (OUTPATIENT)
Dept: FAMILY MEDICINE CLINIC | Age: 44
End: 2024-07-16

## 2024-07-16 RX ORDER — LIRAGLUTIDE 6 MG/ML
3 INJECTION, SOLUTION SUBCUTANEOUS DAILY
Qty: 5 ADJUSTABLE DOSE PRE-FILLED PEN SYRINGE | Refills: 5 | Status: SHIPPED | OUTPATIENT
Start: 2024-07-16

## 2024-07-16 RX ORDER — LIRAGLUTIDE 6 MG/ML
3 INJECTION, SOLUTION SUBCUTANEOUS WEEKLY
Qty: 5 ADJUSTABLE DOSE PRE-FILLED PEN SYRINGE | Refills: 5 | Status: SHIPPED | OUTPATIENT
Start: 2024-07-16 | End: 2024-07-16 | Stop reason: SDUPTHER

## 2024-07-16 RX ORDER — BUSPIRONE HYDROCHLORIDE 30 MG/1
TABLET ORAL
Qty: 90 TABLET | Refills: 5 | Status: SHIPPED | OUTPATIENT
Start: 2024-07-16

## 2024-07-16 NOTE — TELEPHONE ENCOUNTER
Ryland from [facility] needs to clarify directions for this patients medication of ELANA Marcelino can be reached at [phone]    Please Advise

## 2024-07-16 NOTE — TELEPHONE ENCOUNTER
Called pharmacy.  They stated that the Saxenda is injected daily.  They need to know how much to inject daily on rx.  Please advise

## 2024-07-17 ENCOUNTER — TELEPHONE (OUTPATIENT)
Dept: ADMINISTRATIVE | Age: 44
End: 2024-07-17

## 2024-07-18 NOTE — TELEPHONE ENCOUNTER
May we please try to appeal. Patient has been on with very much success and she is asking for the increase dosage to be approved.  She also tried and failed Ozempic due to side effects. The patient is also a diabetic  Thank you..

## 2024-07-18 NOTE — TELEPHONE ENCOUNTER
The medication was DENIED; DENIAL letter uploaded to MEDIA.    Medication is not covered and is a plan exclusion.    If you want an APPEAL; please note in this encounter what new information you would like to APPEAL with.  Once complete route back to PA POOL.    If this requires a response please respond to the pool ( P MHCX PSC MEDICATION PRE-AUTH).      Thank you please advise patient.

## 2024-07-19 NOTE — TELEPHONE ENCOUNTER
This medication is a Plan Exclusion; not a Denial.  The option for Appeal is not available because it is not a covered product.    If the patient is persistent that they want a specific medication that is not covered by the plan; then they can call the insurance and attempt to get a Formulary Override, or a Coverage Determination.      If this requires a response please respond to the pool ( P MHCX PSC MEDICATION PRE-AUTH).      Thank you please advise patient.

## 2024-07-24 ENCOUNTER — PATIENT MESSAGE (OUTPATIENT)
Dept: FAMILY MEDICINE CLINIC | Age: 44
End: 2024-07-24

## 2024-07-24 DIAGNOSIS — E66.9 TYPE 2 DIABETES MELLITUS WITH OBESITY (HCC): Primary | ICD-10-CM

## 2024-07-24 DIAGNOSIS — E11.69 TYPE 2 DIABETES MELLITUS WITH OBESITY (HCC): Primary | ICD-10-CM

## 2024-07-24 RX ORDER — LIRAGLUTIDE 6 MG/ML
1.8 INJECTION SUBCUTANEOUS DAILY
Qty: 9 ML | Refills: 5 | Status: SHIPPED | OUTPATIENT
Start: 2024-07-24

## 2024-07-24 NOTE — TELEPHONE ENCOUNTER
From: Myla Astudillo  To: Dr. Martinez Baron  Sent: 7/24/2024 1:47 PM EDT  Subject: Saxenda    I unintentionally have been 3 days without my saxenda and I have wanted to eat everything in sight. I didn’t stop to realize I hadn’t taken it until today. Is there any way you can write a note to the insurance company to get them to understand this? Can we use my diabetes Dx for this?

## 2024-07-25 ENCOUNTER — TELEPHONE (OUTPATIENT)
Dept: FAMILY MEDICINE CLINIC | Age: 44
End: 2024-07-25

## 2024-07-25 ENCOUNTER — TELEPHONE (OUTPATIENT)
Dept: ADMINISTRATIVE | Age: 44
End: 2024-07-25

## 2024-07-25 RX ORDER — LIRAGLUTIDE 6 MG/ML
1.2 INJECTION SUBCUTANEOUS DAILY
Qty: 2 ADJUSTABLE DOSE PRE-FILLED PEN SYRINGE | Refills: 0 | Status: SHIPPED | OUTPATIENT
Start: 2024-07-25 | End: 2024-08-23

## 2024-08-16 ENCOUNTER — PATIENT MESSAGE (OUTPATIENT)
Dept: FAMILY MEDICINE CLINIC | Age: 44
End: 2024-08-16

## 2024-08-16 DIAGNOSIS — F31.81 BIPOLAR 2 DISORDER (HCC): ICD-10-CM

## 2024-08-16 RX ORDER — LAMOTRIGINE 200 MG/1
TABLET ORAL
Qty: 60 TABLET | Refills: 3 | Status: SHIPPED | OUTPATIENT
Start: 2024-08-16

## 2024-08-16 RX ORDER — LEVOTHYROXINE SODIUM 0.05 MG/1
TABLET ORAL
Qty: 60 TABLET | Refills: 3 | Status: SHIPPED | OUTPATIENT
Start: 2024-08-16

## 2024-08-23 ENCOUNTER — PATIENT MESSAGE (OUTPATIENT)
Dept: FAMILY MEDICINE CLINIC | Age: 44
End: 2024-08-23

## 2024-08-23 DIAGNOSIS — F98.8 ATTENTION DEFICIT DISORDER (ADD) WITHOUT HYPERACTIVITY: ICD-10-CM

## 2024-08-23 RX ORDER — LIRAGLUTIDE 6 MG/ML
1.2 INJECTION SUBCUTANEOUS DAILY
Qty: 6 ML | Refills: 1 | Status: SHIPPED | OUTPATIENT
Start: 2024-08-23

## 2024-08-23 RX ORDER — METHYLPHENIDATE HYDROCHLORIDE 10 MG/1
TABLET ORAL
Qty: 60 TABLET | Refills: 0 | OUTPATIENT
Start: 2024-08-23 | End: 2024-09-22

## 2024-08-23 RX ORDER — METHYLPHENIDATE HYDROCHLORIDE 10 MG/1
TABLET ORAL
Qty: 60 TABLET | Refills: 0 | Status: SHIPPED | OUTPATIENT
Start: 2024-08-23 | End: 2024-09-22

## 2024-09-19 RX ORDER — PROGESTERONE 200 MG/1
CAPSULE ORAL
Qty: 30 CAPSULE | Refills: 0 | Status: SHIPPED | OUTPATIENT
Start: 2024-09-19

## 2024-09-25 DIAGNOSIS — F41.9 ANXIETY: ICD-10-CM

## 2024-09-25 RX ORDER — ALPRAZOLAM 2 MG
2 TABLET ORAL 3 TIMES DAILY PRN
Qty: 90 TABLET | Refills: 2 | Status: SHIPPED | OUTPATIENT
Start: 2024-09-25 | End: 2024-12-24

## 2024-10-21 RX ORDER — PROGESTERONE 200 MG/1
CAPSULE ORAL
Qty: 30 CAPSULE | Refills: 0 | Status: SHIPPED | OUTPATIENT
Start: 2024-10-21

## 2024-10-27 ENCOUNTER — PATIENT MESSAGE (OUTPATIENT)
Dept: FAMILY MEDICINE CLINIC | Age: 44
End: 2024-10-27

## 2024-10-27 DIAGNOSIS — F98.8 ATTENTION DEFICIT DISORDER (ADD) WITHOUT HYPERACTIVITY: ICD-10-CM

## 2024-10-28 ENCOUNTER — OFFICE VISIT (OUTPATIENT)
Dept: FAMILY MEDICINE CLINIC | Age: 44
End: 2024-10-28
Payer: COMMERCIAL

## 2024-10-28 VITALS
RESPIRATION RATE: 18 BRPM | DIASTOLIC BLOOD PRESSURE: 80 MMHG | OXYGEN SATURATION: 97 % | SYSTOLIC BLOOD PRESSURE: 118 MMHG | BODY MASS INDEX: 46.79 KG/M2 | HEART RATE: 90 BPM | WEIGHT: 281.2 LBS

## 2024-10-28 DIAGNOSIS — M79.671 PAIN OF RIGHT HEEL: ICD-10-CM

## 2024-10-28 DIAGNOSIS — N95.1 PERIMENOPAUSAL: Primary | ICD-10-CM

## 2024-10-28 DIAGNOSIS — E11.69 TYPE 2 DIABETES MELLITUS WITH OBESITY (HCC): ICD-10-CM

## 2024-10-28 DIAGNOSIS — K52.9 CHRONIC DIARRHEA: ICD-10-CM

## 2024-10-28 DIAGNOSIS — Z12.31 SCREENING MAMMOGRAM FOR BREAST CANCER: ICD-10-CM

## 2024-10-28 DIAGNOSIS — E66.9 TYPE 2 DIABETES MELLITUS WITH OBESITY (HCC): ICD-10-CM

## 2024-10-28 PROCEDURE — G8484 FLU IMMUNIZE NO ADMIN: HCPCS | Performed by: FAMILY MEDICINE

## 2024-10-28 PROCEDURE — 1036F TOBACCO NON-USER: CPT | Performed by: FAMILY MEDICINE

## 2024-10-28 PROCEDURE — 2022F DILAT RTA XM EVC RTNOPTHY: CPT | Performed by: FAMILY MEDICINE

## 2024-10-28 PROCEDURE — G8417 CALC BMI ABV UP PARAM F/U: HCPCS | Performed by: FAMILY MEDICINE

## 2024-10-28 PROCEDURE — G8427 DOCREV CUR MEDS BY ELIG CLIN: HCPCS | Performed by: FAMILY MEDICINE

## 2024-10-28 PROCEDURE — 3044F HG A1C LEVEL LT 7.0%: CPT | Performed by: FAMILY MEDICINE

## 2024-10-28 PROCEDURE — 99214 OFFICE O/P EST MOD 30 MIN: CPT | Performed by: FAMILY MEDICINE

## 2024-10-28 RX ORDER — METHYLPHENIDATE HYDROCHLORIDE 10 MG/1
TABLET ORAL
Qty: 60 TABLET | Refills: 0 | Status: SHIPPED | OUTPATIENT
Start: 2024-10-28 | End: 2024-11-27

## 2024-10-28 NOTE — PROGRESS NOTES
Status: She is alert and oriented to person, place, and time. Mental status is at baseline.   Psychiatric:         Mood and Affect: Mood normal.         Behavior: Behavior normal.         Thought Content: Thought content normal.         Judgment: Judgment normal.         ASSESSMENT/PLAN:    1. Perimenopausal  - unable to track menses.  If labs support menopaus, could try estrace.  Would be able to stop progesterone   - Follicle Stimulating Hormone; Future  - Luteinizing Hormone; Future    2. Type 2 diabetes mellitus with obesity (HCC)  Good control without meds currently.  Foot exam normal.  Continue monitoring  -  DIABETES FOOT EXAM    3. Chronic diarrhea  - I had wanted her to see GI previously for this, perhaps they could Rx Xifaxin and get it approved.  - [facility] - Ramón Raman DO, [facility]    4. Pain of right heel  - discussed treatment for plantar fasciitis (provided instructions)      Return in about 3 months (around 1/28/2025) for follow up anxiety, follow up ADHD.    An  [employer] was used to authenticate this note.    --Martinez Baron MD on 10/28/2024 at 1:14 PM

## 2024-10-28 NOTE — PATIENT INSTRUCTIONS
Plantar fasciitis:  - aggressive calf stretching (demonstrated), fascial stretching prior to walking in the morning, avoid bare feet on hard floors, icing, OTC NSAIDS, arch supports for shoes (recommended Superfeet).  Can refer to podiatry if worsens or persists, but can take many months to resolve fully.    Look through med list.  See what med you may be  able to stop: Lamictal, inderal, Buspar?

## 2024-11-01 ENCOUNTER — OFFICE VISIT (OUTPATIENT)
Dept: INTERNAL MEDICINE CLINIC | Age: 44
End: 2024-11-01
Payer: COMMERCIAL

## 2024-11-01 ENCOUNTER — HOSPITAL ENCOUNTER (OUTPATIENT)
Dept: GENERAL RADIOLOGY | Age: 44
Discharge: HOME | End: 2024-11-01
Payer: COMMERCIAL

## 2024-11-01 DIAGNOSIS — M79.671 PAIN OF RIGHT HEEL: ICD-10-CM

## 2024-11-01 DIAGNOSIS — M79.671 PAIN OF RIGHT HEEL: Primary | ICD-10-CM

## 2024-11-01 PROCEDURE — 99213 OFFICE O/P EST LOW 20 MIN: CPT

## 2024-11-01 PROCEDURE — 73630 X-RAY EXAM OF FOOT: CPT

## 2024-11-01 RX ORDER — METHYLPREDNISOLONE 4 MG/1
TABLET ORAL
Qty: 1 KIT | Refills: 0 | Status: SHIPPED | OUTPATIENT
Start: 2024-11-01 | End: 2024-11-01

## 2024-11-01 RX ORDER — METHYLPREDNISOLONE 4 MG/1
TABLET ORAL
Qty: 1 KIT | Refills: 0 | Status: SHIPPED | OUTPATIENT
Start: 2024-11-01 | End: 2024-11-07

## 2024-11-04 NOTE — PROGRESS NOTES
Department of Podiatry  Resident Progress Note    Myla Astudillo  Allergies: Hydromorphone hcl, Cipro xr, Ciprofloxacin, Dilaudid [hydromorphone], Eggs [egg white], and Tape [adhesive tape]    SUBJECTIVE  The patient is a 44 y.o. female who presents with Joint pain right heel pain has been present over the past week.  Patient states that she presented to her primary care physician who stated that was concerning for possible plantar fasciitis.  The patient has a history of plantar fibroma and cystic changes to the right lower extremity was concerned that possible cystic changes were occurring to the right heel.  Patient notes that nothing makes the pain better or worse.  Patient relates that pain is consistent through all shoe gear and does not appear to be lower extremity deformity.  Patient has a recent history of a plantar fibroma removal to the right lower extremity for which the surgical incision is well-healed.  This occurred with Dr. Jose Guillen DPM several months ago.  Patient has been continue to treat symptoms with ice, elevation, rest.  She states that nothing appears to make right lower extremity better or worse.  Patient denies any nausea, vomiting, fever or chills at this time.  Patient denies any other pedal complaints.    Past Medical History:        Diagnosis Date    Anxiety     Arthritis     knees, ankle, wrists    Asthma     Back pain     Body piercing     retained in nose/ears    Chronic constipation 12/23/2014    Depression 06/09/2011    Diabetes mellitus (HCC)     Fatty liver     GERD (gastroesophageal reflux disease)     Hyperlipidemia     Hypothyroidism 06/09/2011    Migraine     Obesity     Other ovarian dysfunction (luteal est/progest deficiency) 06/09/2011    PCOS (polycystic ovarian syndrome)     Prolonged emergence from general anesthesia     Vitamin D deficiency 06/09/2011    Wears glasses        REVIEW OF SYSTEMS:  CONSTITUTIONAL:  negative    OBJECTIVE  Patient presents

## 2024-11-16 ENCOUNTER — PATIENT MESSAGE (OUTPATIENT)
Dept: FAMILY MEDICINE CLINIC | Age: 44
End: 2024-11-16

## 2024-11-18 RX ORDER — FLUCONAZOLE 200 MG/1
200 TABLET ORAL DAILY
Qty: 7 TABLET | Refills: 0 | Status: SHIPPED | OUTPATIENT
Start: 2024-11-18 | End: 2024-11-25

## 2024-11-19 DIAGNOSIS — E78.2 MIXED HYPERLIPIDEMIA: ICD-10-CM

## 2024-11-19 RX ORDER — ATORVASTATIN CALCIUM 40 MG/1
40 TABLET, FILM COATED ORAL DAILY
Qty: 30 TABLET | Refills: 5 | Status: SHIPPED | OUTPATIENT
Start: 2024-11-19

## 2024-11-19 RX ORDER — DESVENLAFAXINE 100 MG/1
TABLET, EXTENDED RELEASE ORAL
Qty: 30 TABLET | Refills: 5 | Status: SHIPPED | OUTPATIENT
Start: 2024-11-19

## 2024-11-26 ENCOUNTER — PATIENT MESSAGE (OUTPATIENT)
Dept: FAMILY MEDICINE CLINIC | Age: 44
End: 2024-11-26

## 2024-12-17 DIAGNOSIS — F31.81 BIPOLAR 2 DISORDER (HCC): ICD-10-CM

## 2024-12-17 RX ORDER — LAMOTRIGINE 200 MG/1
TABLET ORAL
Qty: 60 TABLET | Refills: 5 | Status: SHIPPED | OUTPATIENT
Start: 2024-12-17

## 2024-12-17 RX ORDER — CLONIDINE HYDROCHLORIDE 0.1 MG/1
TABLET ORAL
Qty: 60 TABLET | Refills: 5 | Status: SHIPPED | OUTPATIENT
Start: 2024-12-17

## 2025-01-13 DIAGNOSIS — F41.9 ANXIETY: ICD-10-CM

## 2025-01-14 RX ORDER — ALPRAZOLAM 2 MG/1
2 TABLET ORAL 3 TIMES DAILY PRN
Qty: 90 TABLET | Refills: 2 | Status: SHIPPED | OUTPATIENT
Start: 2025-01-14 | End: 2025-04-14

## 2025-01-14 RX ORDER — LEVOTHYROXINE SODIUM 50 UG/1
TABLET ORAL
Qty: 90 TABLET | Refills: 1 | Status: SHIPPED | OUTPATIENT
Start: 2025-01-14

## 2025-01-14 RX ORDER — BUSPIRONE HYDROCHLORIDE 30 MG/1
TABLET ORAL
Qty: 90 TABLET | Refills: 5 | Status: SHIPPED | OUTPATIENT
Start: 2025-01-14

## 2025-01-15 ENCOUNTER — PATIENT MESSAGE (OUTPATIENT)
Dept: FAMILY MEDICINE CLINIC | Age: 45
End: 2025-01-15

## 2025-01-15 DIAGNOSIS — F98.8 ATTENTION DEFICIT DISORDER (ADD) WITHOUT HYPERACTIVITY: ICD-10-CM

## 2025-01-15 RX ORDER — METHYLPHENIDATE HYDROCHLORIDE 10 MG/1
TABLET ORAL
Qty: 60 TABLET | Refills: 0 | Status: SHIPPED | OUTPATIENT
Start: 2025-01-15 | End: 2025-02-14

## 2025-01-18 ENCOUNTER — PATIENT MESSAGE (OUTPATIENT)
Dept: FAMILY MEDICINE CLINIC | Age: 45
End: 2025-01-18

## 2025-01-20 RX ORDER — SUMATRIPTAN 3 MG/1
INJECTION, SOLUTION SUBCUTANEOUS
Qty: 4 ADJUSTABLE DOSE PRE-FILLED PEN SYRINGE | Refills: 0 | Status: SHIPPED | OUTPATIENT
Start: 2025-01-20

## 2025-01-20 NOTE — TELEPHONE ENCOUNTER
Patient not able to get a hold of her Neurologist and she would like a refill on her emergency migraine medication-Zembrace.  Ok to give a refill as she needs the medication refilled?  Med is pended

## 2025-01-23 ENCOUNTER — TELEPHONE (OUTPATIENT)
Dept: ADMINISTRATIVE | Age: 45
End: 2025-01-23

## 2025-01-23 NOTE — TELEPHONE ENCOUNTER
Submitted PA for Zembrace SymTouch 3MG/0.5ML auto-injectors  Via CMM Key: [id number] STATUS: PENDING.    Follow up done daily; if no decision with in three days we will refax.  If another three days goes by with no decision will call the insurance for status.

## 2025-01-24 RX ORDER — PROGESTERONE 200 MG/1
CAPSULE ORAL
Qty: 30 CAPSULE | Refills: 0 | Status: SHIPPED | OUTPATIENT
Start: 2025-01-24

## 2025-02-06 NOTE — TELEPHONE ENCOUNTER
The medication is APPROVED.    Request Reference Number: [id number]. ZEMBRACE SYM INJ 3/0.5ML is approved through 01/23/2026. Your patient may now fill this prescription and it will be covered..     If this requires a response please respond to the pool ( P MHCX PSC MEDICATION PRE-AUTH).      Thank you please advise patient.

## 2025-02-14 ENCOUNTER — PATIENT MESSAGE (OUTPATIENT)
Dept: FAMILY MEDICINE CLINIC | Age: 45
End: 2025-02-14

## 2025-02-17 ENCOUNTER — OFFICE VISIT (OUTPATIENT)
Dept: FAMILY MEDICINE CLINIC | Age: 45
End: 2025-02-17
Payer: COMMERCIAL

## 2025-02-17 VITALS
BODY MASS INDEX: 46.69 KG/M2 | TEMPERATURE: 98 F | DIASTOLIC BLOOD PRESSURE: 80 MMHG | HEART RATE: 116 BPM | HEIGHT: 65 IN | RESPIRATION RATE: 20 BRPM | WEIGHT: 280.2 LBS | SYSTOLIC BLOOD PRESSURE: 126 MMHG | OXYGEN SATURATION: 98 %

## 2025-02-17 DIAGNOSIS — N95.1 PERIMENOPAUSAL: ICD-10-CM

## 2025-02-17 DIAGNOSIS — H00.011 HORDEOLUM EXTERNUM OF RIGHT UPPER EYELID: Primary | ICD-10-CM

## 2025-02-17 DIAGNOSIS — J45.21 MILD INTERMITTENT ASTHMA WITH EXACERBATION: ICD-10-CM

## 2025-02-17 DIAGNOSIS — H53.9 VISUAL DISTURBANCE: ICD-10-CM

## 2025-02-17 LAB
ESTRADIOL SERPL-MCNC: 79 PG/ML
FSH SERPL-ACNC: 9.6 MIU/ML
LH SERPL-ACNC: 10.1 MIU/ML

## 2025-02-17 PROCEDURE — G8417 CALC BMI ABV UP PARAM F/U: HCPCS | Performed by: FAMILY MEDICINE

## 2025-02-17 PROCEDURE — 99214 OFFICE O/P EST MOD 30 MIN: CPT | Performed by: FAMILY MEDICINE

## 2025-02-17 PROCEDURE — G8427 DOCREV CUR MEDS BY ELIG CLIN: HCPCS | Performed by: FAMILY MEDICINE

## 2025-02-17 PROCEDURE — G2211 COMPLEX E/M VISIT ADD ON: HCPCS | Performed by: FAMILY MEDICINE

## 2025-02-17 PROCEDURE — 1036F TOBACCO NON-USER: CPT | Performed by: FAMILY MEDICINE

## 2025-02-17 RX ORDER — PREDNISONE 20 MG/1
20 TABLET ORAL 2 TIMES DAILY
Qty: 10 TABLET | Refills: 0 | Status: SHIPPED | OUTPATIENT
Start: 2025-02-17 | End: 2025-02-22

## 2025-02-17 RX ORDER — ALBUTEROL SULFATE 90 UG/1
2 INHALANT RESPIRATORY (INHALATION) 4 TIMES DAILY PRN
Qty: 18 G | Refills: 0 | Status: SHIPPED | OUTPATIENT
Start: 2025-02-17

## 2025-02-17 RX ORDER — AZITHROMYCIN 250 MG/1
TABLET, FILM COATED ORAL
Qty: 1 PACKET | Refills: 0 | Status: SHIPPED | OUTPATIENT
Start: 2025-02-17

## 2025-02-17 NOTE — PROGRESS NOTES
2025    Blood pressure 126/80, pulse (!) 116, temperature 98 °F (36.7 °C), temperature source Oral, resp. rate 20, height 1.651 m (5' 5\"), weight 127.1 kg (280 lb 3.2 oz), last menstrual period 10/13/2013, SpO2 98%, not currently breastfeeding.    Myla Astudillo (:  1980) is a 44 y.o. female, here for evaluation of the following medical concerns:    Chief Complaint   Patient presents with    Cough     Cough and wheezing x4 weeks     Eye Problem     Pt would like R eye looked at        Here for cough and wheezing. But also has R eye problem that is separate.    Eye pain for past 10 days. After working with plants in the house and dirt dust blew into her face.  Did note slight irritation at that time.  Wiped eyes with 'wet cloth'    The next day noted swelling on her upper lid.  Looked red and round. No d/c.  Treated with warm compresses and eye lubricant drops for 3 days.  But the symptoms did not improve.  Dried eye drops at this raffaele  Feels a swelling upper lid still \  Has noted difficulty seeing through R eye - a stripe of distortion centrally, with preserved vision medially and laterally.  She still feels a FB in her eye.    This started 10 days ago.  Still does not have d/c  Does not water.    Has hx of asthma has felt flared up for past month after a viral illness. (Thinks had flu A, getting from a friend).  Worse lying down.  Has Symbicort 2 puffs bid that she uses faithfully.  Does not feel better wt albuterol.      Patient Active Problem List   Diagnosis    Allergy to eggs    Vitamin D deficiency    PMS (premenstrual syndrome)    Hypothyroidism    PCOD (polycystic ovarian disease)    Anxiety    ADD (attention deficit disorder)    Bipolar 2 disorder (HCC)    Family history of breast cancer (mother, age 40)    Asthma    Insomnia    Allergic rhinitis    Hyperlipidemia, unspecified    NAFLD (nonalcoholic fatty liver disease)    Migraine    Gastroparesis    Obstructive sleep apnea syndrome

## 2025-02-18 ENCOUNTER — PATIENT MESSAGE (OUTPATIENT)
Dept: FAMILY MEDICINE CLINIC | Age: 45
End: 2025-02-18

## 2025-02-18 RX ORDER — PROGESTERONE 200 MG/1
CAPSULE ORAL
Qty: 30 CAPSULE | Refills: 0 | Status: SHIPPED | OUTPATIENT
Start: 2025-02-18

## 2025-02-19 RX ORDER — OMEPRAZOLE 40 MG/1
40 CAPSULE, DELAYED RELEASE ORAL 2 TIMES DAILY
Qty: 180 CAPSULE | Refills: 1 | Status: SHIPPED | OUTPATIENT
Start: 2025-02-19

## 2025-03-21 DIAGNOSIS — F51.04 PSYCHOPHYSIOLOGICAL INSOMNIA: ICD-10-CM

## 2025-03-21 RX ORDER — QUETIAPINE 200 MG/1
TABLET, FILM COATED, EXTENDED RELEASE ORAL
Qty: 60 TABLET | Refills: 5 | Status: SHIPPED | OUTPATIENT
Start: 2025-03-21

## 2025-04-21 DIAGNOSIS — F41.9 ANXIETY: Primary | ICD-10-CM

## 2025-04-22 RX ORDER — ALPRAZOLAM 2 MG/1
TABLET ORAL
Qty: 90 TABLET | Refills: 2 | Status: SHIPPED | OUTPATIENT
Start: 2025-04-22 | End: 2025-07-21

## 2025-04-22 RX ORDER — LEVOTHYROXINE SODIUM 50 UG/1
TABLET ORAL
Qty: 90 TABLET | Refills: 1 | Status: SHIPPED | OUTPATIENT
Start: 2025-04-22

## 2025-05-09 ENCOUNTER — TELEPHONE (OUTPATIENT)
Dept: FAMILY MEDICINE CLINIC | Age: 45
End: 2025-05-09

## 2025-05-09 DIAGNOSIS — N64.4 BREAST PAIN, LEFT: ICD-10-CM

## 2025-05-09 DIAGNOSIS — Z12.39 ENCOUNTER FOR BREAST CANCER SCREENING USING NON-MAMMOGRAM MODALITY: Primary | ICD-10-CM

## 2025-05-13 ENCOUNTER — HOSPITAL ENCOUNTER (OUTPATIENT)
Dept: WOMENS IMAGING | Age: 45
Discharge: HOME | End: 2025-05-13
Attending: FAMILY MEDICINE
Payer: COMMERCIAL

## 2025-05-13 VITALS — HEIGHT: 65 IN | WEIGHT: 280 LBS | BODY MASS INDEX: 46.65 KG/M2

## 2025-05-13 DIAGNOSIS — N64.4 BREAST PAIN, LEFT: ICD-10-CM

## 2025-05-13 DIAGNOSIS — Z12.39 ENCOUNTER FOR BREAST CANCER SCREENING USING NON-MAMMOGRAM MODALITY: ICD-10-CM

## 2025-05-13 PROCEDURE — G0279 TOMOSYNTHESIS, MAMMO: HCPCS

## 2025-05-14 ENCOUNTER — RESULTS FOLLOW-UP (OUTPATIENT)
Dept: FAMILY MEDICINE CLINIC | Age: 45
End: 2025-05-14

## 2025-05-19 DIAGNOSIS — E78.2 MIXED HYPERLIPIDEMIA: ICD-10-CM

## 2025-05-19 RX ORDER — ATORVASTATIN CALCIUM 40 MG/1
40 TABLET, FILM COATED ORAL DAILY
Qty: 30 TABLET | Refills: 5 | Status: SHIPPED | OUTPATIENT
Start: 2025-05-19

## 2025-05-19 RX ORDER — DESVENLAFAXINE 100 MG/1
TABLET, EXTENDED RELEASE ORAL
Qty: 30 TABLET | Refills: 5 | Status: SHIPPED | OUTPATIENT
Start: 2025-05-19

## 2025-06-13 ENCOUNTER — PATIENT MESSAGE (OUTPATIENT)
Dept: FAMILY MEDICINE CLINIC | Age: 45
End: 2025-06-13

## 2025-06-13 ENCOUNTER — E-VISIT (OUTPATIENT)
Dept: PRIMARY CARE CLINIC | Age: 45
End: 2025-06-13

## 2025-06-13 DIAGNOSIS — B36.9 FUNGAL DERMATITIS: Primary | ICD-10-CM

## 2025-06-13 DIAGNOSIS — F98.8 ATTENTION DEFICIT DISORDER (ADD) WITHOUT HYPERACTIVITY: ICD-10-CM

## 2025-06-13 RX ORDER — FLUCONAZOLE 150 MG/1
150 TABLET ORAL
Qty: 2 TABLET | Refills: 0 | Status: SHIPPED | OUTPATIENT
Start: 2025-06-13 | End: 2025-06-19

## 2025-06-13 RX ORDER — METHYLPHENIDATE HYDROCHLORIDE 10 MG/1
TABLET ORAL
Qty: 60 TABLET | Refills: 0 | Status: SHIPPED | OUTPATIENT
Start: 2025-06-13 | End: 2025-07-13

## 2025-06-13 NOTE — TELEPHONE ENCOUNTER
There are no past orders for diflucan and I have not seen a note in past 5 yearsf or this issue.  If bad odor may be bacterial infection as well.  I don't know what else she has already tried.  Not comfortable with just sending something in.    Can she do a MyChart visit to provide more info and photos in next 30 minutes?

## 2025-06-13 NOTE — PROGRESS NOTES
Reviewed questionnaire and photo    Reviewed meds/allergies    Dx Fungal dermatitis    Plan Rx given for diflucan, follow up with PCP if no improvement    Time spent on visit 11 min

## 2025-06-16 DIAGNOSIS — F31.81 BIPOLAR 2 DISORDER (HCC): ICD-10-CM

## 2025-06-16 RX ORDER — LAMOTRIGINE 200 MG/1
TABLET ORAL
Qty: 60 TABLET | Refills: 5 | Status: SHIPPED | OUTPATIENT
Start: 2025-06-16

## 2025-06-16 RX ORDER — CLONIDINE HYDROCHLORIDE 0.1 MG/1
TABLET ORAL
Qty: 60 TABLET | Refills: 5 | Status: SHIPPED | OUTPATIENT
Start: 2025-06-16

## 2025-06-16 RX ORDER — FLUCONAZOLE 150 MG/1
150 TABLET ORAL ONCE
Qty: 1 TABLET | Refills: 0 | Status: SHIPPED | OUTPATIENT
Start: 2025-06-16 | End: 2025-06-16

## 2025-07-02 ENCOUNTER — OFFICE VISIT (OUTPATIENT)
Dept: SURGERY | Age: 45
End: 2025-07-02
Payer: COMMERCIAL

## 2025-07-02 VITALS
DIASTOLIC BLOOD PRESSURE: 80 MMHG | BODY MASS INDEX: 47.15 KG/M2 | TEMPERATURE: 98.3 F | OXYGEN SATURATION: 98 % | HEIGHT: 65 IN | SYSTOLIC BLOOD PRESSURE: 125 MMHG | WEIGHT: 283 LBS

## 2025-07-02 DIAGNOSIS — M79.3 PANNICULITIS: Primary | ICD-10-CM

## 2025-07-02 PROCEDURE — G8427 DOCREV CUR MEDS BY ELIG CLIN: HCPCS

## 2025-07-02 PROCEDURE — G8417 CALC BMI ABV UP PARAM F/U: HCPCS

## 2025-07-02 PROCEDURE — 1036F TOBACCO NON-USER: CPT

## 2025-07-02 PROCEDURE — 99204 OFFICE O/P NEW MOD 45 MIN: CPT

## 2025-07-02 NOTE — PROGRESS NOTES
[facility]    Consultation requested by: Self    CC: Body contouring    HPI: 45 y.o. female with a PMHx as delineated below who presents in consultation for body contouring. Since she has lost weight she has hanging skin of her pannus that gets rashes and sores and yeast infection. She has been given prescription diflucan for treatment and yet the infections come back continuously.  She also has hanging upper arm skin as well that is bothersome and her bilateral breast.     Bariatric surgery: Yes / date: 2018 (Type: sleeve gastrectomy)    Max weight (lbs): 348  Lowest weight (lbs): 275  Current (lbs): 283  Weight change in the past 3 months: No, weight stable  Max BMI: 57.9  Current BMI: Body mass index is 47.09 kg/m².    History of DVT/PE: No  Excess skin cover genitals: Yes  Previous body contouring procedures: No   Smoking: none      Last Mammogram: 5/13/25 (scored over 20 % for her lifetime risk of developing breast cancer)    Current bra size: 42 DDD  Desired bra size: \"lifted\"  Pregnancies/miscarriages: 4/3  Breast feeding: no future plans    Breast Symptoms:                                        Upper back pain: Yes      Bra strap grooves: No      Wears supportive bras (>1 yr): Yes      Tried conservative measures (PT, MDs,etc): No      Intertrigo: Yes      Head/neck pain: No      Headaches: Yes      Paresthesias of hands/fingers: No      Past Medical History:   Diagnosis Date    Anxiety     Arthritis     knees, ankle, wrists    Asthma     Back pain     Body piercing     retained in nose/ears    Chronic constipation 12/23/2014    Depression 06/09/2011    Diabetes mellitus (HCC)     Fatty liver     GERD (gastroesophageal reflux disease)     Hyperlipidemia     Hypothyroidism 06/09/2011    Migraine     Obesity     Other ovarian dysfunction (luteal est/progest deficiency) 06/09/2011    PCOS (polycystic ovarian syndrome)     Prolonged emergence from general anesthesia     Vitamin D

## 2025-07-03 ENCOUNTER — TELEPHONE (OUTPATIENT)
Dept: SURGERY | Age: 45
End: 2025-07-03

## 2025-07-03 DIAGNOSIS — B37.2 CANDIDIASIS OF SKIN: Primary | ICD-10-CM

## 2025-07-03 RX ORDER — NYSTATIN 100000 U/G
OINTMENT TOPICAL
Qty: 30 G | Refills: 2 | Status: SHIPPED | OUTPATIENT
Start: 2025-07-03

## 2025-07-03 NOTE — TELEPHONE ENCOUNTER
Patient called and stated she was in for an appointment yesterday and was told that she would have a prescription for nystatin powder sent to her pharmacy.    Patient said she called her pharmacy and they have not received a prescription for that.    Patient asked if this could be sent to the [facility]'s pharmacy on [address].    Patient can be reached at [phone]

## 2025-07-07 ENCOUNTER — TELEPHONE (OUTPATIENT)
Dept: SURGERY | Age: 45
End: 2025-07-07

## 2025-07-17 ENCOUNTER — PATIENT MESSAGE (OUTPATIENT)
Dept: FAMILY MEDICINE CLINIC | Age: 45
End: 2025-07-17

## 2025-07-17 DIAGNOSIS — Z28.21 COVID-19 VACCINATION DECLINED: ICD-10-CM

## 2025-07-17 DIAGNOSIS — U07.1 COVID-19 VIRUS INFECTION: Primary | ICD-10-CM

## 2025-07-17 NOTE — TELEPHONE ENCOUNTER
Sent in Paxlovid.  HOLD atorvastatin 5 days.  Take only 1 seroquel at bedtime for next 5 days.

## 2025-07-22 RX ORDER — LEVOTHYROXINE SODIUM 50 UG/1
TABLET ORAL
Qty: 90 TABLET | Refills: 1 | Status: SHIPPED | OUTPATIENT
Start: 2025-07-22

## 2025-07-22 RX ORDER — BUSPIRONE HYDROCHLORIDE 30 MG/1
TABLET ORAL
Qty: 90 TABLET | Refills: 5 | Status: SHIPPED | OUTPATIENT
Start: 2025-07-22

## 2025-07-29 ENCOUNTER — OFFICE VISIT (OUTPATIENT)
Dept: FAMILY MEDICINE CLINIC | Age: 45
End: 2025-07-29
Payer: COMMERCIAL

## 2025-07-29 VITALS
SYSTOLIC BLOOD PRESSURE: 122 MMHG | OXYGEN SATURATION: 97 % | WEIGHT: 280 LBS | RESPIRATION RATE: 16 BRPM | HEIGHT: 65 IN | BODY MASS INDEX: 46.65 KG/M2 | DIASTOLIC BLOOD PRESSURE: 80 MMHG

## 2025-07-29 DIAGNOSIS — K31.84 GASTROPARESIS: ICD-10-CM

## 2025-07-29 DIAGNOSIS — F98.8 ATTENTION DEFICIT DISORDER (ADD) WITHOUT HYPERACTIVITY: Primary | ICD-10-CM

## 2025-07-29 DIAGNOSIS — F31.81 BIPOLAR 2 DISORDER (HCC): ICD-10-CM

## 2025-07-29 DIAGNOSIS — E78.5 HYPERLIPIDEMIA, UNSPECIFIED HYPERLIPIDEMIA TYPE: ICD-10-CM

## 2025-07-29 DIAGNOSIS — E03.9 ACQUIRED HYPOTHYROIDISM: ICD-10-CM

## 2025-07-29 DIAGNOSIS — E66.9 TYPE 2 DIABETES MELLITUS WITH OBESITY (HCC): ICD-10-CM

## 2025-07-29 DIAGNOSIS — G47.33 OBSTRUCTIVE SLEEP APNEA SYNDROME: ICD-10-CM

## 2025-07-29 DIAGNOSIS — F41.9 ANXIETY: ICD-10-CM

## 2025-07-29 DIAGNOSIS — E11.69 TYPE 2 DIABETES MELLITUS WITH OBESITY (HCC): ICD-10-CM

## 2025-07-29 LAB
ALBUMIN SERPL-MCNC: 4.4 G/DL (ref 3.4–5)
ALBUMIN/GLOB SERPL: 1.6 {RATIO} (ref 1.1–2.2)
ALP SERPL-CCNC: 112 U/L (ref 40–129)
ALT SERPL-CCNC: 69 U/L (ref 10–40)
ANION GAP SERPL CALCULATED.3IONS-SCNC: 10 MMOL/L (ref 3–16)
AST SERPL-CCNC: 52 U/L (ref 15–37)
BILIRUB SERPL-MCNC: 0.4 MG/DL (ref 0–1)
BUN SERPL-MCNC: 13 MG/DL (ref 7–20)
CALCIUM SERPL-MCNC: 9.8 MG/DL (ref 8.3–10.6)
CHLORIDE SERPL-SCNC: 107 MMOL/L (ref 99–110)
CHOLEST SERPL-MCNC: 242 MG/DL (ref 0–199)
CO2 SERPL-SCNC: 24 MMOL/L (ref 21–32)
CREAT SERPL-MCNC: 0.8 MG/DL (ref 0.6–1.1)
CREAT UR-MCNC: 161 MG/DL (ref 28–259)
GFR SERPLBLD CREATININE-BSD FMLA CKD-EPI: >90 ML/MIN/{1.73_M2}
GLUCOSE SERPL-MCNC: 147 MG/DL (ref 70–99)
HDLC SERPL-MCNC: 40 MG/DL (ref 40–60)
LDLC SERPL CALC-MCNC: 173 MG/DL
MICROALBUMIN UR DL<=1MG/L-MCNC: 1.24 MG/DL
MICROALBUMIN/CREAT UR: 7.7 MG/G (ref 0–30)
POTASSIUM SERPL-SCNC: 4.4 MMOL/L (ref 3.5–5.1)
PROT SERPL-MCNC: 7.2 G/DL (ref 6.4–8.2)
SODIUM SERPL-SCNC: 141 MMOL/L (ref 136–145)
TRIGL SERPL-MCNC: 146 MG/DL (ref 0–150)
TSH SERPL DL<=0.005 MIU/L-ACNC: 1.6 UIU/ML (ref 0.27–4.2)
VLDLC SERPL CALC-MCNC: 29 MG/DL

## 2025-07-29 PROCEDURE — 1036F TOBACCO NON-USER: CPT | Performed by: FAMILY MEDICINE

## 2025-07-29 PROCEDURE — G8417 CALC BMI ABV UP PARAM F/U: HCPCS | Performed by: FAMILY MEDICINE

## 2025-07-29 PROCEDURE — 99214 OFFICE O/P EST MOD 30 MIN: CPT | Performed by: FAMILY MEDICINE

## 2025-07-29 PROCEDURE — 3052F HG A1C>EQUAL 8.0%<EQUAL 9.0%: CPT | Performed by: FAMILY MEDICINE

## 2025-07-29 PROCEDURE — 2022F DILAT RTA XM EVC RTNOPTHY: CPT | Performed by: FAMILY MEDICINE

## 2025-07-29 PROCEDURE — G8427 DOCREV CUR MEDS BY ELIG CLIN: HCPCS | Performed by: FAMILY MEDICINE

## 2025-07-29 RX ORDER — POLYETHYLENE GLYCOL 3350 17 G/17G
17 POWDER, FOR SOLUTION ORAL DAILY
Qty: 1530 G | Refills: 5 | Status: SHIPPED | OUTPATIENT
Start: 2025-07-29 | End: 2025-08-28

## 2025-07-29 RX ORDER — METHYLPHENIDATE HYDROCHLORIDE 10 MG/1
TABLET ORAL
Qty: 60 TABLET | Refills: 0 | Status: SHIPPED | OUTPATIENT
Start: 2025-07-29 | End: 2025-08-28

## 2025-07-29 RX ORDER — LEVOTHYROXINE SODIUM 50 UG/1
TABLET ORAL
Qty: 90 TABLET | Refills: 1 | Status: SHIPPED | OUTPATIENT
Start: 2025-07-29

## 2025-07-29 NOTE — ASSESSMENT & PLAN NOTE
On atorva due to diabetes    Orders:    Lipid Panel; Future    Comprehensive Metabolic Panel; Future

## 2025-07-29 NOTE — ASSESSMENT & PLAN NOTE
- has tolerated GLP-1 therapy in the past, but will be on the lookout for gi problems as she starts Zepbound again.

## 2025-07-29 NOTE — PROGRESS NOTES
2025    Blood pressure 122/80, resp. rate 16, height 1.651 m (5' 5\"), weight 127 kg (280 lb), last menstrual period 10/13/2013, SpO2 97%, not currently breastfeeding.    Myla Astudillo (:  1980) is a 45 y.o. female, here for evaluation of the following medical concerns:    Chief Complaint   Patient presents with    Medication Check     Pt had covid two weeks ago feels like she has stuff in her lungs that isn't coming up.  Pt would like to start the mounjaro generic [facility]      Here for routine follow up of multiple medical problems with her daughter.    Had COVID 2 wks ago and is still coughing.  Nonproductive cough. Some head congestion. But no longer body aches and fatigue.    MELONIE: does not have or use cpap.  Had sleep study done prior to gastric sleeve surgery .  AHI was only 5.4 or so then.  Has not tried to get GLP-1 under this diagnosis.  Prior bariatric surgery.  She counts calories and has lost 8 lbs.  But difficult to maintain.    Bipolar:  mood seems labile of late.   Currently on Buspar for a anxiety 45 bid, clonidine 0.1 bid, Pristiq 100, Seroquel 200 at bedtime (not 400), Topamax 200/d (for migraines), Lamictal 200 bid for bipolar.    Has never used lithium.    Unsure if mood varies with cycle; s/p ROSE but ovaries remain.  She has not identified specific triggers for mood variation.  Mood is better after deeper sleep- after anesthesia. Naps often.    Some times she does not slep at all. More irritable after these time.    ADHD: on low dose ritalin for this- improves concentration, task completion.   She has been off this for times and it did not improve her mood.  Actually was worse and feels better with her mood with stimulant use- can think more clearly.    No SE's like headaches, palpitations.      Thyroid: on synthroid 100;   Lab Results   Component Value Date    TSH 1.24 2024        Hx DM-2:  A1c has been in normal range for years. After gastric

## 2025-07-29 NOTE — ASSESSMENT & PLAN NOTE
- need to update sleep study. Likely worse now.  - try GLP-1 for MELONIE symptoms.  - dysfunctional sleep is likely at the root of mood instability also    Orders:    tirzepatide-weight management (ZEPBOUND) 2.5 MG/0.5ML SOAJ subCUTAneous auto-injector pen; Inject 2.5 mg into the skin every 7 days    Michelle Thurston MD, Sleep Medicine ([facility]), [facility]

## 2025-07-29 NOTE — ASSESSMENT & PLAN NOTE
- control not known, but A1c in normal range at last test (<5.7).  - retest     Orders:    Albumin/Creatinine Ratio, Urine; Future    Hemoglobin A1C; Future     DIABETES FOOT EXAM

## 2025-07-30 ENCOUNTER — RESULTS FOLLOW-UP (OUTPATIENT)
Dept: FAMILY MEDICINE CLINIC | Age: 45
End: 2025-07-30

## 2025-07-30 DIAGNOSIS — E11.65 TYPE 2 DIABETES MELLITUS WITH HYPERGLYCEMIA, WITHOUT LONG-TERM CURRENT USE OF INSULIN (HCC): Primary | ICD-10-CM

## 2025-07-30 LAB
EST. AVERAGE GLUCOSE BLD GHB EST-MCNC: 197.3 MG/DL
HBA1C MFR BLD: 8.5 %

## 2025-07-31 RX ORDER — BLOOD-GLUCOSE METER
1 KIT MISCELLANEOUS DAILY
Qty: 1 KIT | Refills: 0 | Status: SHIPPED | OUTPATIENT
Start: 2025-07-31

## 2025-07-31 RX ORDER — BLOOD SUGAR DIAGNOSTIC
1 STRIP MISCELLANEOUS DAILY
Qty: 100 EACH | Refills: 3 | Status: SHIPPED | OUTPATIENT
Start: 2025-07-31

## 2025-08-21 ENCOUNTER — PATIENT MESSAGE (OUTPATIENT)
Dept: FAMILY MEDICINE CLINIC | Age: 45
End: 2025-08-21

## 2025-08-25 DIAGNOSIS — F41.9 ANXIETY: ICD-10-CM

## 2025-08-25 DIAGNOSIS — F98.8 ATTENTION DEFICIT DISORDER (ADD) WITHOUT HYPERACTIVITY: ICD-10-CM

## 2025-08-25 RX ORDER — METHYLPHENIDATE HYDROCHLORIDE 10 MG/1
TABLET ORAL
Qty: 60 TABLET | Refills: 0 | Status: SHIPPED | OUTPATIENT
Start: 2025-08-25 | End: 2025-09-24

## 2025-08-25 RX ORDER — ALPRAZOLAM 2 MG/1
TABLET ORAL
Qty: 90 TABLET | Refills: 2 | Status: SHIPPED | OUTPATIENT
Start: 2025-08-25 | End: 2025-11-23

## (undated) DEVICE — SURGICAL SET UP - SURE SET: Brand: MEDLINE INDUSTRIES, INC.

## (undated) DEVICE — SYSTEM SMK EVAC LAP TBNG FILTER HSNG BENT STYL PNK SEE CLR

## (undated) DEVICE — SUTURE VCRL SZ 4-0 L18IN ABSRB UD L19MM PS-2 3/8 CIR PRIM J496H

## (undated) DEVICE — GLOVE SURG 7.5 PF POLYMER WHT STRL SIGN LTX ESSENTIAL LTX

## (undated) DEVICE — GLOVE SURG SZ 75 L12IN FNGR THK87MIL DK GRN LTX FREE ISOLEX

## (undated) DEVICE — STAPLER SKIN L440MM 32MM LNG 12 FIRING B FRM PWR + GRIPPING

## (undated) DEVICE — Device

## (undated) DEVICE — ZIMMER® STERILE DISPOSABLE TOURNIQUET CUFF WITH PLC, DUAL PORT, SINGLE BLADDER, 18 IN. (46 CM)

## (undated) DEVICE — SUTURE ETHBND EXCEL SZ 0 L30IN NONABSORBABLE GRN L26MM SH X834H

## (undated) DEVICE — SYRINGE MEDICAL 3ML CLEAR PLASTIC STANDARD NON CONTROL LUERLOCK TIP DISPOSABLE

## (undated) DEVICE — TOTAL TRAY, 16FR 10ML SIL FOLEY, URN: Brand: MEDLINE

## (undated) DEVICE — TURNOVER KIT RM INF CTRL TECH

## (undated) DEVICE — GOWN,SIRUS,POLYRNF,SETINSLV,L,20/CS: Brand: MEDLINE

## (undated) DEVICE — 40583 XL ADVANCED TRENDELENBURG POSITIONING KIT: Brand: 40583 XL ADVANCED TRENDELENBURG POSITIONING KIT

## (undated) DEVICE — NEEDLE HYPO 16GA L1.5IN PUR POLYPR HUB S STL REG BVL STR

## (undated) DEVICE — PODIATRY: Brand: MEDLINE INDUSTRIES, INC.

## (undated) DEVICE — TROCAR: Brand: KII FIOS FIRST ENTRY

## (undated) DEVICE — ELECTRODE PT RET AD L9FT HI MOIST COND ADH HYDRGEL CORDED

## (undated) DEVICE — SPONGE,LAP,4"X18",XR,ST,5/PK,40PK/CS: Brand: MEDLINE INDUSTRIES, INC.

## (undated) DEVICE — PENCIL ES L3M ROCK SWCH S STL HEX LOK BLDE ELECTRD HOLSTER

## (undated) DEVICE — KIT DRP 3 ARM ACC DISP ENDOWRIST DA VINCI SI

## (undated) DEVICE — GOWN,SIRUS,POLYRNF,SETINSLV,XL,20/CS: Brand: MEDLINE

## (undated) DEVICE — [HIGH FLOW INSUFFLATOR,  DO NOT USE IF PACKAGE IS DAMAGED,  KEEP DRY,  KEEP AWAY FROM SUNLIGHT,  PROTECT FROM HEAT AND RADIOACTIVE SOURCES.]: Brand: PNEUMOSURE

## (undated) DEVICE — PACK,BASIC: Brand: MEDLINE

## (undated) DEVICE — VESSEL SEALER: Brand: ENDOWRIST;DAVINCI SI

## (undated) DEVICE — SUTURE VCRL SZ 2-0 L27IN ABSRB UD L26MM SH 1/2 CIR J417H

## (undated) DEVICE — SOLUTION IV 1000ML 0.9% SOD CHL

## (undated) DEVICE — SUTURE ETHILON SZ 3-0 L18IN NONABSORBABLE BLK PS-2 L19MM 3/8 1669H

## (undated) DEVICE — CHLORAPREP 26ML ORANGE

## (undated) DEVICE — SYRINGE MED 10ML LUERLOCK TIP W/O SFTY DISP

## (undated) DEVICE — TOWEL,STOP FLAG GOLD N-W: Brand: MEDLINE

## (undated) DEVICE — RELOAD STPL H4.1X2MM DIA60MM THCK TISS GRN 6 ROW PWR GST B

## (undated) DEVICE — BOWL MED L 32OZ PLAS W/ MOLD GRAD EZ OPN PEEL PCH

## (undated) DEVICE — GLOVE ORANGE PI 7   MSG9070

## (undated) DEVICE — TROCAR: Brand: KII OPTICAL ACCESS SYSTEM

## (undated) DEVICE — SYRINGE MED 10ML POLYPR LUERSLIP TIP FLAT TOP W/O SFTY DISP

## (undated) DEVICE — TRAP FLUID

## (undated) DEVICE — RELOAD STPL L60MM H1.5-3.6MM REG TISS BLU GRIPPING SURF B

## (undated) DEVICE — TOWEL,OR,DSP,ST,BLUE,DLX,8/PK,10PK/CS: Brand: MEDLINE

## (undated) DEVICE — COVER LT HNDL BLU PLAS

## (undated) DEVICE — SURE SET-DOUBLE BASIN-LF: Brand: MEDLINE INDUSTRIES, INC.

## (undated) DEVICE — INTENDED FOR TISSUE SEPARATION, AND OTHER PROCEDURES THAT REQUIRE A SHARP SURGICAL BLADE TO PUNCTURE OR CUT.: Brand: BARD-PARKER ® CARBON RIB-BACK BLADES

## (undated) DEVICE — STANDARD HYPODERMIC NEEDLE,POLYPROPYLENE HUB: Brand: MONOJECT

## (undated) DEVICE — RELOAD STPL H1.8-3.8MM REG THCK TISS G 6 ROW GRIPPING SURF

## (undated) DEVICE — GAUZE,SPONGE,4"X4",16PLY,XRAY,STRL,LF: Brand: MEDLINE

## (undated) DEVICE — SYRINGE MED 10ML TRNSLUC BRL PLUNG BLK MRK POLYPR CTRL

## (undated) DEVICE — OBTURATOR ROBOTIC DIA8MM LNG BLDELSS ENDOSCP DISP DA VINCI

## (undated) DEVICE — SPONGE,LAP,18"X18",DLX,XR,ST,5/PK,40/PK: Brand: MEDLINE

## (undated) DEVICE — GARMENT,MEDLINE,DVT,INT,CALF,LG, GEN2: Brand: MEDLINE

## (undated) DEVICE — LAPAROSCOPIC SCISSORS: Brand: EPIX LAPAROSCOPIC SCISSORS

## (undated) DEVICE — SUTURE VCRL SZ 0 L54IN ABSRB VLT W/O NDL POLYGLACTIN 910 J616H

## (undated) DEVICE — CADIERE FORCEPS: Brand: ENDOWRIST;DAVINCI SI

## (undated) DEVICE — BINDER ABD H12IN FOR 62-74IN WAIST UNIV 4 PNL PREM DSGN E

## (undated) DEVICE — DRAPE,LAP,CHOLE,W/TROUGHS,STERILE: Brand: MEDLINE

## (undated) DEVICE — NEEDLE INSUF L150MM DIA2MM DISP FOR PNEUMOPERI ENDOPATH

## (undated) DEVICE — ANTI-FOG SOLUTION WITH FOAM PAD: Brand: DEVON

## (undated) DEVICE — SOLUTION INJ LR VISIV 1000ML BG

## (undated) DEVICE — SKIN AFFIX SURG ADHESIVE 72/CS 0.55ML: Brand: MEDLINE

## (undated) DEVICE — GOWN,SIRUS,FABRNF,XL,20/CS: Brand: MEDLINE

## (undated) DEVICE — PUMP SUC IRR TBNG L10FT W/ HNDPC ASSEMB STRYKEFLOW 2

## (undated) DEVICE — GOWN,SIRUS,POLYRNF,BRTHSLV,XL,30/CS: Brand: MEDLINE

## (undated) DEVICE — SYRINGE CATH TIP 50ML

## (undated) DEVICE — NEPTUNE E-SEP SMOKE EVACUATION PENCIL, COATED, 70MM BLADE, PUSH BUTTON SWITCH: Brand: NEPTUNE E-SEP

## (undated) DEVICE — SOLUTION ANTIFOG VIS SYS CLEARIFY LAPSCP

## (undated) DEVICE — LARGE NEEDLE DRIVER: Brand: ENDOWRIST;DAVINCI SI

## (undated) DEVICE — 3M™ WARMING BLANKET, UPPER BODY, 10 PER CASE, 42268: Brand: BAIR HUGGER™